# Patient Record
Sex: FEMALE | Race: WHITE | Employment: OTHER | URBAN - METROPOLITAN AREA
[De-identification: names, ages, dates, MRNs, and addresses within clinical notes are randomized per-mention and may not be internally consistent; named-entity substitution may affect disease eponyms.]

---

## 2017-01-16 ENCOUNTER — OFFICE VISIT (OUTPATIENT)
Dept: ORTHOPEDIC SURGERY | Age: 62
End: 2017-01-16

## 2017-01-16 VITALS
RESPIRATION RATE: 16 BRPM | HEART RATE: 79 BPM | HEIGHT: 62 IN | WEIGHT: 136.2 LBS | SYSTOLIC BLOOD PRESSURE: 125 MMHG | DIASTOLIC BLOOD PRESSURE: 80 MMHG | BODY MASS INDEX: 25.06 KG/M2

## 2017-01-16 DIAGNOSIS — M54.14 THORACIC AND LUMBOSACRAL NEURITIS: ICD-10-CM

## 2017-01-16 DIAGNOSIS — M51.37 DEGENERATION OF LUMBAR OR LUMBOSACRAL INTERVERTEBRAL DISC: ICD-10-CM

## 2017-01-16 DIAGNOSIS — M54.17 THORACIC AND LUMBOSACRAL NEURITIS: ICD-10-CM

## 2017-01-16 DIAGNOSIS — M54.50 LOW BACK PAIN WITHOUT SCIATICA, UNSPECIFIED BACK PAIN LATERALITY, UNSPECIFIED CHRONICITY: Primary | ICD-10-CM

## 2017-01-16 DIAGNOSIS — M81.0 OSTEOPOROSIS: ICD-10-CM

## 2017-01-16 DIAGNOSIS — M53.3 SACRAL PAIN: ICD-10-CM

## 2017-01-16 RX ORDER — ALENDRONATE SODIUM 70 MG/1
70 TABLET ORAL
COMMUNITY
Start: 2016-10-25

## 2017-01-16 RX ORDER — OXYCODONE AND ACETAMINOPHEN 5; 325 MG/1; MG/1
TABLET ORAL
Qty: 40 TAB | Refills: 0 | Status: CANCELLED | OUTPATIENT
Start: 2017-01-16

## 2017-01-16 RX ORDER — OXYCODONE AND ACETAMINOPHEN 5; 325 MG/1; MG/1
TABLET ORAL
Qty: 40 TAB | Refills: 0 | Status: SHIPPED | OUTPATIENT
Start: 2017-01-16 | End: 2017-08-18

## 2017-01-16 NOTE — MR AVS SNAPSHOT
Visit Information Date & Time Provider Department Dept. Phone Encounter #  
 1/16/2017  8:20 AM Everton Hernández  [facility] [address] [facility] - SPECIALTY [place] [phone] Follow-up Instructions Return for following MRI. Upcoming Health Maintenance Date Due Hepatitis C Screening 1955 ZOSTER VACCINE AGE 60> 6/23/2016 INFLUENZA AGE 9 TO ADULT 8/1/2016 PAP AKA CERVICAL CYTOLOGY 5/1/2017 BREAST CANCER SCRN MAMMOGRAM 10/11/2018 COLONOSCOPY 12/23/2021 DTaP/Tdap/Td series (2 - Td) 1/1/2022 Allergies as of 1/16/2017  Review Complete On: 1/16/2017 By: Everton Hernández MD  
  
 Severity Noted Reaction Type Reactions Sulfa (Sulfonamide Antibiotics)  05/06/2015    Hives Current Immunizations  Never Reviewed Name Date Pneumococcal Polysaccharide (PPSV-23) 7/1/2012 Tdap 1/1/2012 Not reviewed this visit You Were Diagnosed With   
  
 Codes Comments Low back pain without sciatica, unspecified back pain laterality, unspecified chronicity    -  Primary ICD-10-CM: M54.5 ICD-9-CM: 724.2 Thoracic and lumbosacral neuritis     ICD-10-CM: M54.14, M54.17 ICD-9-CM: 724.4 Degeneration of lumbar or lumbosacral intervertebral disc     ICD-10-CM: M51.37 
ICD-9-CM: 722.52 Sacral pain     ICD-10-CM: M53.3 ICD-9-CM: 724.6 Osteoporosis     ICD-10-CM: M81.0 ICD-9-CM: 733.00 Vitals BP Pulse Resp Height(growth percentile) Weight(growth percentile) BMI  
 125/80 (BP 1 Location: Left arm, BP Patient Position: Sitting) 79 16 5' 2\" (1.575 m) 136 lb 3.2 oz (61.8 kg) 24.91 kg/m2 OB Status Smoking Status Postmenopausal Never Smoker BMI and BSA Data Body Mass Index Body Surface Area 24.91 kg/m 2 1.64 m 2 Preferred Pharmacy Pharmacy Name Phone [facility], [address] Susanna Ulises [phone] Your Updated Medication List  
 This list is accurate as of: 1/16/17  9:44 AM.  Always use your most recent med list.  
  
  
  
  
 alendronate 70 mg tablet Commonly known as:  FOSAMAX AMBIEN CR 12.5 mg tablet Generic drug:  zolpidem CR Take 12.5 mg by mouth nightly as needed for Sleep. amLODIPine 10 mg tablet Commonly known as:  Diane Darting Take 1 Tab by mouth daily. CELLCEPT PO Take  by mouth.  
  
 levothyroxine 150 mcg tablet Commonly known as:  SYNTHROID  
1 qd MAXALT PO Take  by mouth. ORACEA 40 mg capsule Generic drug:  doxycycline monohydrate Take 40 mg by mouth every morning. oxyCODONE-acetaminophen 5-325 mg per tablet Commonly known as:  PERCOCET Take 1 po q daily - bid prn pain  
  
 predniSONE 5 mg tablet Commonly known as:  Hollace John Take 5 mg by mouth daily. RESTASIS 0.05 % ophthalmic emulsion Generic drug:  cycloSPORINE Administer 1 Drop to both eyes two (2) times a day. TOPAMAX 100 mg tablet Generic drug:  topiramate Take  by mouth two (2) times a day. valACYclovir 1 gram tablet Commonly known as:  VALTREX Take 1 Tab by mouth daily. VALIUM PO Take 5 mg by mouth three (3) times daily as needed. WELLBUTRIN  mg XL tablet Generic drug:  buPROPion XL Take 300 mg by mouth every morning. Prescriptions Printed Refills  
 oxyCODONE-acetaminophen (PERCOCET) 5-325 mg per tablet 0 Sig: Take 1 po q daily - bid prn pain  
 Class: Print We Performed the Following AMB POC XRAY, SPINE, LUMBOSACRAL; 2 O [32464 CPT(R)] POC XRAY, PELVIS; 1 OR 2 VIEWS [73681 CPT(R)] Follow-up Instructions Return for following MRI. To-Do List   
 01/23/2017 Imaging:  MRI LUMB SPINE WO CONT   
  
 01/23/2017 Imaging:  MRI PELV WO CONT Referral Information Referral ID Referred By Referred To  
  
 [id number] AMPARO COLBY III Not Available Visits Status Start Date End Date 1 New Request 1/16/17 1/16/18 If your referral has a status of pending review or denied, additional information will be sent to support the outcome of this decision. Referral ID Referred By Referred To  
 [id number] AMPARO COLBY III Not Available Visits Status Start Date End Date 1 New Request 1/16/17 1/16/18 If your referral has a status of pending review or denied, additional information will be sent to support the outcome of this decision. Introducing [facility]! Renae Huerta introduces ePAR patient portal. Now you can access parts of your medical record, email your doctor's office, and request medication refills online. 1. In your internet browser, go to [email or web address] 2. Click on the First Time User? Click Here link in the Sign In box. You will see the New Member Sign Up page. 3. Enter your ePAR Access Code exactly as it appears below. You will not need to use this code after youve completed the sign-up process. If you do not sign up before the expiration date, you must request a new code. · ePAR Access Code: [email or web address] Expires: 4/16/2017  8:02 AM 
 
4. Enter the last four digits of your Social Security Number (xxxx) and Date of Birth (mm/dd/yyyy) as indicated and click Submit. You will be taken to the next sign-up page. 5. Create a ePAR ID. This will be your ePAR login ID and cannot be changed, so think of one that is secure and easy to remember. 6. Create a ePAR password. You can change your password at any time. 7. Enter your Password Reset Question and Answer. This can be used at a later time if you forget your password. 8. Enter your e-mail address. You will receive e-mail notification when new information is available in [address] 9. Click Sign Up. You can now view and download portions of your medical record. 10. Click the Download Summary menu link to download a portable copy of your medical information. If you have questions, please visit the Frequently Asked Questions section of the [employer] website. Remember, [employer] is NOT to be used for urgent needs. For medical emergencies, dial 911. Now available from your iPhone and Android! Please provide this summary of care documentation to your next provider. Your primary care clinician is listed as Leni Hale. If you have any questions after today's visit, please call [phone].

## 2017-01-16 NOTE — PROGRESS NOTES
[facility]  [address], [address]  Phone: [phone]  Fax: [phone]        PROGRESS NOTE      HISTORY OF PRESENT ILLNESS:  The patient is a 62 y.o. female and was seen today for follow up of chronic low back pain extending into the BLE in an S1 distribution with RLE > LLE. She described symptoms consistent for stenosis. She reports near resolution of her back nad sacral pain. Lumbar spine MRI dated 6/4/15 per report revealed partial visualization of a suspected stress versus insufficiency fracture at the right upper sacral wing. Consider dedicated MRI, or nuclear medicine imaging. Relatively mild multielvel degenerative disc disease of the lumbar spine. Most notable at L4/5 and L1/2 along L5/S1. No high-grade spinal stenosis. The patient returns today with complaints of low back pain into the RLE posteriorly to the knee. Patient reports recurrence of pain six months ago. She rates pain 3-6/10. patient states she has been working again. Her pain is not positional. I last saw patient on 8/6/15 with c/o chronic low back pain into the BLE. At that time, she reported near resolution of her pain and was scheduled to come back on an as-needed basis. She denies hx of lumbar spinal surgery. Pt received lumbar blocks back in 2012 with good relief. She has attended physical therapy and a chiropractor in the past with benefit. Pt is inconsistent with her HEP. She is taking Percocet prn for pain relief. Pt states she d/c'd Fosamax due to GI upset roughly three months ago. She is currently taking Topamax 100 mg BID. Pt is prescribed chronic Prednisone for pancreas vulgaris. Noted, patient is followed by an endocrinologist in [place].  reviewed.        Past Medical History   Diagnosis Date    Arthritis     Back pain     Bursitis of right shoulder      subacromial    Degenerative arthritis of right knee     Depression     DVT of leg (deep venous thrombosis) ([facility] [place] [facility] 7/2015     (L) ankle fx    GERD (gastroesophageal reflux disease)     Herniated disc     Herpes simplex      genital    Hypertension     IBS (irritable bowel syndrome)     Lower extremity pain, bilateral     Migraine headache     Pemphigus vulgaris     Right knee pain     Right shoulder pain     Rosacea     Thyroid disease     Venous (peripheral) insufficiency     Wears glasses         Social History     Social History    Marital status:      Spouse name: N/A    Number of children: N/A    Years of education: N/A     Occupational History    Not on file. Social History Main Topics    Smoking status: Never Smoker    Smokeless tobacco: Never Used    Alcohol use 0.0 oz/week     0 Standard drinks or equivalent per week      Comment: One glass of wine per month    Drug use: No    Sexual activity: Not on file     Other Topics Concern    Not on file     Social History Narrative       Current Outpatient Prescriptions   Medication Sig Dispense Refill    oxyCODONE-acetaminophen (PERCOCET) 5-325 mg per tablet Take 1 po q daily - bid prn pain 40 Tab 0    amLODIPine (NORVASC) 10 mg tablet Take 1 Tab by mouth daily. 90 Tab 3    valACYclovir (VALTREX) 1 gram tablet Take 1 Tab by mouth daily. 90 Tab 3    levothyroxine (SYNTHROID) 150 mcg tablet 1 qd 90 Tab 3    predniSONE (DELTASONE) 5 mg tablet Take 5 mg by mouth daily.  MYCOPHENOLATE MOFETIL (CELLCEPT PO) Take  by mouth.  buPROPion XL (WELLBUTRIN XL) 300 mg XL tablet Take 300 mg by mouth every morning.  topiramate (TOPAMAX) 100 mg tablet Take  by mouth two (2) times a day.  RIZATRIPTAN BENZOATE (MAXALT PO) Take  by mouth.  doxycycline monohydrate (ORACEA) 40 mg capsule Take 40 mg by mouth every morning.  zolpidem CR (AMBIEN CR) 12.5 mg tablet Take 12.5 mg by mouth nightly as needed for Sleep.  DIAZEPAM (VALIUM PO) Take 5 mg by mouth three (3) times daily as needed.       cycloSPORINE (RESTASIS) 0.05 % ophthalmic emulsion Administer 1 Drop to both eyes two (2) times a day.  alendronate (FOSAMAX) 70 mg tablet          Allergies   Allergen Reactions    Sulfa (Sulfonamide Antibiotics) Hives          PHYSICAL EXAMINATION    Visit Vitals    /80 (BP 1 Location: Left arm, BP Patient Position: Sitting)    Pulse 79    Resp 16    Ht 5' 2\" (1.575 m)    Wt 136 lb 3.2 oz (61.8 kg)    BMI 24.91 kg/m2       CONSTITUTIONAL: NAD, A&O x 3  SENSATION: Increased tenderness to palpation of bilateral SI joint. Intact to light touch throughout  RANGE OF MOTION: The patient has full passive range of motion in all four extremities. MOTOR:  Straight Leg Raise: Negative, bilateral   MARTINA SIGN: Positive and equivocal, bilaterally. Hip Flex Knee Ext Knee Flex Ankle DF GTE Ankle PF Tone   Right +4/5 +4/5 +4/5 +4/5 +4/5 +4/5 +4/5   Left +4/5 +4/5 +4/5 +4/5 +4/5 +4/5 +4/5     RADIOGRAPHS  Preliminary reading of lumbosacral plain films:  Mild disc space narrowing L5-S1. Anterior osteophytes L5. No acute pathology identified. ASSESSMENT   Anthony Coates was seen today for back pain and leg pain. Diagnoses and all orders for this visit:    Low back pain without sciatica, unspecified back pain laterality, unspecified chronicity  -     [25402] L/S 2-3 views  -     MRI PELV WO CONT; Future  -     MRI LUMB SPINE WO CONT; Future  -     oxyCODONE-acetaminophen (PERCOCET) 5-325 mg per tablet; Take 1 po q daily - bid prn pain    Thoracic and lumbosacral neuritis  -     MRI PELV WO CONT; Future  -     MRI LUMB SPINE WO CONT; Future  -     oxyCODONE-acetaminophen (PERCOCET) 5-325 mg per tablet; Take 1 po q daily - bid prn pain    Degeneration of lumbar or lumbosacral intervertebral disc  -     MRI PELV WO CONT; Future  -     MRI LUMB SPINE WO CONT; Future  -     oxyCODONE-acetaminophen (PERCOCET) 5-325 mg per tablet;  Take 1 po q daily - bid prn pain    Sacral pain  -     MRI PELV WO CONT; Future  - MRI LUMB SPINE WO CONT; Future  -     [01987] Pelvis 1-2 views  -     oxyCODONE-acetaminophen (PERCOCET) 5-325 mg per tablet; Take 1 po q daily - bid prn pain    Osteoporosis  -     MRI PELV WO CONT; Future  -     MRI LUMB SPINE WO CONT; Future  -     oxyCODONE-acetaminophen (PERCOCET) 5-325 mg per tablet; Take 1 po q daily - bid prn pain    Other orders  -     Cancel: oxyCODONE-acetaminophen (PERCOCET) 5-325 mg per tablet; 1 tab PO Qday- BID prn pain          IMPRESSION AND PLAN:  The patient presents with similar pain complaints as from when I saw her in August 2015. Clinically, she appears to have bilateral SI joint dysfunction, worse on the right. I will set her up for a lumbar spine MRI. I advised patient to bring copies of films to next visit. We discussed a referral to an endocrinologist in the near future to further assess her osteoporosis. I will refill her Percocet. I will see the patient back following MRI. Written by Roma Vick, as dictated by Chanelle Gonzalez MD  I examined the patient, reviewed and agree with the note.

## 2017-01-19 ENCOUNTER — DOCUMENTATION ONLY (OUTPATIENT)
Dept: ORTHOPEDIC SURGERY | Age: 62
End: 2017-01-19

## 2017-01-19 NOTE — PROGRESS NOTES
MRI Lumbar w/o is scheduled at [facility], [phone], on 01/28/17, arrive 7:30AM, test 8:00AM. NPO 4 hours prior to testing. No [employer] pre-authorization is required, reference # [id number] per Ms. Bud Roman on 01/19/17. MRI Pelvis w/o is scheduled to follow the MRI Lumbar listed above. [facility], reference# [id number] per Ms. Bud Roman on 01/19/17.

## 2017-02-15 ENCOUNTER — OFFICE VISIT (OUTPATIENT)
Dept: ORTHOPEDIC SURGERY | Age: 62
End: 2017-02-15

## 2017-02-15 VITALS
RESPIRATION RATE: 16 BRPM | DIASTOLIC BLOOD PRESSURE: 85 MMHG | SYSTOLIC BLOOD PRESSURE: 124 MMHG | HEIGHT: 62 IN | WEIGHT: 127 LBS | BODY MASS INDEX: 23.37 KG/M2 | HEART RATE: 76 BPM

## 2017-02-15 DIAGNOSIS — M54.14 RADICULOPATHY, THORACIC REGION: Primary | ICD-10-CM

## 2017-02-15 DIAGNOSIS — M51.37 OTHER INTERVERTEBRAL DISC DEGENERATION, LUMBOSACRAL REGION: ICD-10-CM

## 2017-02-15 DIAGNOSIS — M53.3 SACROCOCCYGEAL DISORDERS, NOT ELSEWHERE CLASSIFIED: ICD-10-CM

## 2017-02-15 NOTE — MR AVS SNAPSHOT
Visit Information Date & Time Provider Department Dept. Phone Encounter #  
 2/15/2017  2:55 PM Elder Pitts MD [facility] [place] [phone] [id number] Follow-up Instructions Return if symptoms worsen or fail to improve. Upcoming Health Maintenance Date Due Hepatitis C Screening 1955 ZOSTER VACCINE AGE 60> 6/23/2016 INFLUENZA AGE 9 TO ADULT 8/1/2016 PAP AKA CERVICAL CYTOLOGY 5/1/2017 BREAST CANCER SCRN MAMMOGRAM 10/11/2018 COLONOSCOPY 12/23/2021 DTaP/Tdap/Td series (2 - Td) 1/1/2022 Allergies as of 2/15/2017  Review Complete On: 2/15/2017 By: Elder Pitts MD  
  
 Severity Noted Reaction Type Reactions Sulfa (Sulfonamide Antibiotics)  05/06/2015    Hives Current Immunizations  Never Reviewed Name Date Pneumococcal Polysaccharide (PPSV-23) 7/1/2012 Tdap 1/1/2012 Not reviewed this visit You Were Diagnosed With   
  
 Codes Comments Radiculopathy, thoracic region    -  Primary ICD-10-CM: M54.14 
ICD-9-CM: 724.4 Vitals BP Pulse Resp Height(growth percentile) Weight(growth percentile) BMI  
 124/85 (BP 1 Location: Left arm, BP Patient Position: Sitting) 76 16 5' 2\" (1.575 m) 127 lb (57.6 kg) 23.23 kg/m2 OB Status Smoking Status Postmenopausal Never Smoker Vitals History BMI and BSA Data Body Mass Index Body Surface Area  
 23.23 kg/m 2 1.59 m 2 Preferred Pharmacy Pharmacy Name Phone [facility] [address], [address] [facility] [phone] Your Updated Medication List  
  
   
This list is accurate as of: 2/15/17  3:42 PM.  Always use your most recent med list.  
  
  
  
  
 alendronate 70 mg tablet Commonly known as:  FOSAMAX AMBIEN CR 12.5 mg tablet Generic drug:  zolpidem CR Take 12.5 mg by mouth nightly as needed for Sleep. amLODIPine 10 mg tablet Commonly known as:  Abel Alstrom Take 1 Tab by mouth daily. CELLCEPT PO Take  by mouth.  
  
 levothyroxine 150 mcg tablet Commonly known as:  SYNTHROID  
1 qd MAXALT PO Take  by mouth. ORACEA 40 mg capsule Generic drug:  doxycycline monohydrate Take 40 mg by mouth every morning. oxyCODONE-acetaminophen 5-325 mg per tablet Commonly known as:  PERCOCET Take 1 po q daily - bid prn pain  
  
 predniSONE 5 mg tablet Commonly known as:  Berle Pouch Take 5 mg by mouth daily. RESTASIS 0.05 % ophthalmic emulsion Generic drug:  cycloSPORINE Administer 1 Drop to both eyes two (2) times a day. TOPAMAX 100 mg tablet Generic drug:  topiramate Take  by mouth two (2) times a day. valACYclovir 1 gram tablet Commonly known as:  VALTREX Take 1 Tab by mouth daily. VALIUM PO Take 5 mg by mouth three (3) times daily as needed. WELLBUTRIN  mg XL tablet Generic drug:  buPROPion XL Take 300 mg by mouth every morning. Follow-up Instructions Return if symptoms worsen or fail to improve. Introducing \[facility]\"" & HEALTH SERVICES! Beny Ontiveros introduces [employer] patient portal. Now you can access parts of your medical record, email your doctor's office, and request medication refills online. 1. In your internet browser, go to [email or web address] 2. Click on the First Time User? Click Here link in the Sign In box. You will see the New Member Sign Up page. 3. Enter your [employer] Access Code exactly as it appears below. You will not need to use this code after youve completed the sign-up process. If you do not sign up before the expiration date, you must request a new code. · [employer] Access Code: [email or web address] Expires: 4/16/2017  8:02 AM 
 
4. Enter the last four digits of your Social Security Number (xxxx) and Date of Birth (mm/dd/yyyy) as indicated and click Submit. You will be taken to the next sign-up page. 5. Create a Lenda ID. This will be your [employer] login ID and cannot be changed, so think of one that is secure and easy to remember. 6. Create a Lenda password. You can change your password at any time. 7. Enter your Password Reset Question and Answer. This can be used at a later time if you forget your password. 8. Enter your e-mail address. You will receive e-mail notification when new information is available in [address] 9. Click Sign Up. You can now view and download portions of your medical record. 10. Click the Download Summary menu link to download a portable copy of your medical information. If you have questions, please visit the Frequently Asked Questions section of the [employer] website. Remember, [employer] is NOT to be used for urgent needs. For medical emergencies, dial 911. Now available from your iPhone and Android! Please provide this summary of care documentation to your next provider. Your primary care clinician is listed as Veronica Dover. If you have any questions after today's visit, please call [phone].

## 2017-02-15 NOTE — PROGRESS NOTES
[facility]  [address], [address]  Phone: [phone]  Fax: [phone]        PROGRESS NOTE      HISTORY OF PRESENT ILLNESS:  The patient is a 62 y.o. female and was seen today for follow up of chronic low back pain into the RLE extending in an S1 distribution to the knee. She denies symptoms in the LLE at this time. She described symptoms consistent for stenosis. Patient states she has been working again. Her pain is not positional. She denies hx of lumbar spinal surgery. Pt received lumbar blocks back in 2012 with good relief. She has attended physical therapy and a chiropractor in the past with benefit. Pt is inconsistent with her HEP. She is taking Percocet prn for pain relief. Pt states she d/c'd Fosamax due to GI upset which she has taken x 6 months. She is currently taking Topamax 100 mg BID. Pt is prescribed chronic Prednisone for pancreas vulgaris. Noted, patient is followed by an endocrinologist in [place]. Lumbar spine MRI dated 6/4/15 per report revealed partial visualization of a suspected stress versus insufficiency fracture at the right upper sacral wing. Consider dedicated MRI, or nuclear medicine imaging. Relatively mild multielvel degenerative disc disease of the lumbar spine. Most notable at L4/5 and L1/2 along L5/S1. No high-grade spinal stenosis. Preliminary reading of lumbosacral plain films revealed mild disc space narrowing L5-S1. Anterior osteophytes L5. No acute pathology identified. At her last clinical appointment, patient presented with similar pain complaints as from when I saw her in August 2015. Clinically, she appeared to have bilateral SI joint dysfunction, worse on the right. I set her up for a lumbar spine MRI. We discussed a referral to an endocrinologist in the near future to further assess her osteoporosis. I refilled her Percocet.       The patient returns today with pain location and distribution remain unchanged. She rates pain 0-1/10, an improvement since her last visit (3-6/10). Pt reports she has been completing her HEP with benefit. She states she was sick and resting which may have been caused an improvement in her pain. Examination continues to be consistent with sacroiliitis on the right. Pt reports she will have a bone density test in the summer. Lumbar spine MRI dated 1/30/17 reviewed. Per report, there was multilevel degenerative changes with mild areas of canal and foraminal encroachment. Left lateral disc osteophyte complexes noted mildly contacting and displacing the exiting left L5 nerve root. Small right paracentral disc protrusion at L1-2. Pelvic MRI dated 1/30/17 was reviewed. Per report, no acute findings. Mild bilateral SI joint osteoarthritic changes with evidence of prior healed insufficiency fracture along the upper anterior aspect of the right sacral ala. Lower lumbar spondylosis at L4-L5 and L5-S1.  reviewed. Past Medical History   Diagnosis Date    Arthritis     Back pain     Bursitis of right shoulder      subacromial    Degenerative arthritis of right knee     Depression     [facility] 7/2015     (L) ankle fx    GERD (gastroesophageal reflux disease)     Herniated disc     Herpes simplex      genital    Hypertension     IBS (irritable bowel syndrome)     Lower extremity pain, bilateral     Migraine headache     Pemphigus vulgaris     Right knee pain     Right shoulder pain     Rosacea     Thyroid disease     Venous (peripheral) insufficiency     Wears glasses         Social History     Social History    Marital status:      Spouse name: N/A    Number of children: N/A    Years of education: N/A     Occupational History    Not on file.      Social History Main Topics    Smoking status: Never Smoker    Smokeless tobacco: Never Used    Alcohol use 0.0 oz/week     0 Standard drinks or equivalent per week      Comment: One glass of wine per month    Drug use: No    Sexual activity: Not on file     Other Topics Concern    Not on file     Social History Narrative       Current Outpatient Prescriptions   Medication Sig Dispense Refill    alendronate (FOSAMAX) 70 mg tablet       oxyCODONE-acetaminophen (PERCOCET) 5-325 mg per tablet Take 1 po q daily - bid prn pain 40 Tab 0    amLODIPine (NORVASC) 10 mg tablet Take 1 Tab by mouth daily. 90 Tab 3    valACYclovir (VALTREX) 1 gram tablet Take 1 Tab by mouth daily. 90 Tab 3    levothyroxine (SYNTHROID) 150 mcg tablet 1 qd 90 Tab 3    predniSONE (DELTASONE) 5 mg tablet Take 5 mg by mouth daily.  MYCOPHENOLATE MOFETIL (CELLCEPT PO) Take  by mouth.  buPROPion XL (WELLBUTRIN XL) 300 mg XL tablet Take 300 mg by mouth every morning.  topiramate (TOPAMAX) 100 mg tablet Take  by mouth two (2) times a day.  doxycycline monohydrate (ORACEA) 40 mg capsule Take 40 mg by mouth every morning.  zolpidem CR (AMBIEN CR) 12.5 mg tablet Take 12.5 mg by mouth nightly as needed for Sleep.  cycloSPORINE (RESTASIS) 0.05 % ophthalmic emulsion Administer 1 Drop to both eyes two (2) times a day.  RIZATRIPTAN BENZOATE (MAXALT PO) Take  by mouth.  DIAZEPAM (VALIUM PO) Take 5 mg by mouth three (3) times daily as needed. Allergies   Allergen Reactions    Sulfa (Sulfonamide Antibiotics) Hives          PHYSICAL EXAMINATION    Visit Vitals    /85 (BP 1 Location: Left arm, BP Patient Position: Sitting)    Pulse 76    Resp 16    Ht 5' 2\" (1.575 m)    Wt 127 lb (57.6 kg)    BMI 23.23 kg/m2       CONSTITUTIONAL: NAD, A&O x 3  SENSATION: Increased tenderness to palpation of the right SI joint. Intact to light touch throughout  RANGE OF MOTION: The patient has full passive range of motion in all four extremities.   MOTOR:  Straight Leg Raise: Negative, bilateral   MARTINA SIGN: Positive, right               Hip Flex Knee Ext Knee Flex Ankle DF GTE Ankle PF Tone Right +4/5 +4/5 +4/5 +4/5 +4/5 +4/5 +4/5   Left +4/5 +4/5 +4/5 +4/5 +4/5 +4/5 +4/5       ASSESSMENT   Mildred De La Cruz was seen today for back pain and leg pain. Diagnoses and all orders for this visit:    Radiculopathy, thoracic region    Other intervertebral disc degeneration, lumbosacral region    Sacrococcygeal disorders, not elsewhere classified          IMPRESSION AND PLAN:  Her examination continues to be consistent with right SI joint dysfunction. patient declines SI joint injection. She reports rest improved her right SI joint dysfunction and I suspect the Prednisone she is taking chronically also contributed to her decreased pain. The patient does not think her pain complaints are severe enough to warrant additional workup/treatment at this time. I encourage patient to perform her HEP daily. I will see the patient back on an as-needed basis. Written by Xenia Wise, as dictated by Lou Siddiqui MD  I examined the patient, reviewed and agree with the note.

## 2017-02-24 DIAGNOSIS — M54.50 LOW BACK PAIN WITHOUT SCIATICA, UNSPECIFIED BACK PAIN LATERALITY, UNSPECIFIED CHRONICITY: ICD-10-CM

## 2017-02-24 DIAGNOSIS — M54.14 THORACIC AND LUMBOSACRAL NEURITIS: ICD-10-CM

## 2017-02-24 DIAGNOSIS — M53.3 SACRAL PAIN: ICD-10-CM

## 2017-02-24 DIAGNOSIS — M51.37 DEGENERATION OF LUMBAR OR LUMBOSACRAL INTERVERTEBRAL DISC: ICD-10-CM

## 2017-02-24 DIAGNOSIS — M81.0 OSTEOPOROSIS: ICD-10-CM

## 2017-02-24 DIAGNOSIS — M54.17 THORACIC AND LUMBOSACRAL NEURITIS: ICD-10-CM

## 2017-04-14 ENCOUNTER — OFFICE VISIT (OUTPATIENT)
Dept: ORTHOPEDIC SURGERY | Age: 62
End: 2017-04-14

## 2017-04-14 VITALS — DIASTOLIC BLOOD PRESSURE: 73 MMHG | HEART RATE: 74 BPM | SYSTOLIC BLOOD PRESSURE: 104 MMHG | TEMPERATURE: 97.1 F

## 2017-04-14 DIAGNOSIS — M53.3 DISORDER OF SACROCOCCYGEAL SPINE: ICD-10-CM

## 2017-04-14 DIAGNOSIS — M54.14 RADICULOPATHY, THORACIC REGION: Primary | ICD-10-CM

## 2017-04-14 DIAGNOSIS — M51.37 OTHER INTERVERTEBRAL DISC DEGENERATION, LUMBOSACRAL REGION: ICD-10-CM

## 2017-04-14 NOTE — MR AVS SNAPSHOT
Visit Information Date & Time Provider Department Dept. Phone Encounter #  
 4/14/2017 10:50 AM Johanny Pathak MD [facility], [address] and [facility] [place] [phone] [id number] Follow-up Instructions Return for following injection. Upcoming Health Maintenance Date Due Hepatitis C Screening 1955 ZOSTER VACCINE AGE 60> 6/23/2016 INFLUENZA AGE 9 TO ADULT 8/1/2016 PAP AKA CERVICAL CYTOLOGY 5/1/2017 BREAST CANCER SCRN MAMMOGRAM 10/11/2018 DTaP/Tdap/Td series (2 - Td) 1/1/2022 COLONOSCOPY 3/21/2022 Allergies as of 4/14/2017  Review Complete On: 4/14/2017 By: Johanny Pathak MD  
  
 Severity Noted Reaction Type Reactions Sulfa (Sulfonamide Antibiotics)  05/06/2015    Hives Current Immunizations  Never Reviewed Name Date Pneumococcal Polysaccharide (PPSV-23) 7/1/2012 Tdap 1/1/2012 Not reviewed this visit You Were Diagnosed With   
  
 Codes Comments Radiculopathy, thoracic region    -  Primary ICD-10-CM: M54.14 
ICD-9-CM: 724.4 Other intervertebral disc degeneration, lumbosacral region     ICD-10-CM: M51.37 
ICD-9-CM: 722.52 Disorder of sacrococcygeal spine     ICD-10-CM: M53.3 ICD-9-CM: 724.6, 724.70 Vitals BP Pulse Temp OB Status Smoking Status 104/73 74 97.1 °F (36.2 °C) Postmenopausal Never Smoker Preferred Pharmacy Pharmacy Name Phone [facility] [facility], [address] [facility] [phone] Your Updated Medication List  
  
   
This list is accurate as of: 4/14/17 11:34 AM.  Always use your most recent med list.  
  
  
  
  
 alendronate 70 mg tablet Commonly known as:  FOSAMAX AMBIEN CR 12.5 mg tablet Generic drug:  zolpidem CR Take 12.5 mg by mouth nightly as needed for Sleep. amLODIPine 10 mg tablet Commonly known as:  Karyle Kitty Take 1 Tab by mouth daily. CELLCEPT PO Take  by mouth. levothyroxine 150 mcg tablet Commonly known as:  SYNTHROID  
1 qd MAXALT PO Take  by mouth. ORACEA 40 mg capsule Generic drug:  doxycycline monohydrate Take 40 mg by mouth every morning. oxyCODONE-acetaminophen 5-325 mg per tablet Commonly known as:  PERCOCET Take 1 po q daily - bid prn pain  
  
 predniSONE 5 mg tablet Commonly known as:  Job Jc Take 5 mg by mouth daily. RESTASIS 0.05 % ophthalmic emulsion Generic drug:  cycloSPORINE Administer 1 Drop to both eyes two (2) times a day. TOPAMAX 100 mg tablet Generic drug:  topiramate Take  by mouth two (2) times a day. valACYclovir 1 gram tablet Commonly known as:  VALTREX Take 1 Tab by mouth daily. VALIUM PO Take 5 mg by mouth three (3) times daily as needed. WELLBUTRIN  mg XL tablet Generic drug:  buPROPion XL Take 300 mg by mouth every morning. Follow-up Instructions Return for following injection. Introducing hospitals & [facility] SERVICES! Jillian Sales introduces [employer] patient portal. Now you can access parts of your medical record, email your doctor's office, and request medication refills online. 1. In your internet browser, go to [email or web address] 2. Click on the First Time User? Click Here link in the Sign In box. You will see the New Member Sign Up page. 3. Enter your Social[employer]ides Access Code exactly as it appears below. You will not need to use this code after youve completed the sign-up process. If you do not sign up before the expiration date, you must request a new code. · Social[employer] Access Code: [email or web address] Expires: 4/16/2017  9:02 AM 
 
4. Enter the last four digits of your Social Security Number (xxxx) and Date of Birth (mm/dd/yyyy) as indicated and click Submit. You will be taken to the next sign-up page. 5. Create a SocialGuides ID.  This will be your SocialGuides login ID and cannot be changed, so think of one that is secure and easy to remember. 6. Create a [employer] password. You can change your password at any time. 7. Enter your Password Reset Question and Answer. This can be used at a later time if you forget your password. 8. Enter your e-mail address. You will receive e-mail notification when new information is available in [address] 9. Click Sign Up. You can now view and download portions of your medical record. 10. Click the Download Summary menu link to download a portable copy of your medical information. If you have questions, please visit the Frequently Asked Questions section of the [employer] website. Remember, [employer] is NOT to be used for urgent needs. For medical emergencies, dial 911. Now available from your iPhone and Android! Please provide this summary of care documentation to your next provider. Your primary care clinician is listed as Medardo Alatorre. If you have any questions after today's visit, please call [phone].

## 2017-04-14 NOTE — PROGRESS NOTES
[facility]  [address], [address]  Phone: [phone]  Fax: [phone]        PROGRESS NOTE      HISTORY OF PRESENT ILLNESS:  The patient is a 62 y.o. female and was seen today for follow up of chronic low back pain into the RLE extending in an S1 distribution to the knee. She denies symptoms in the LLE at this time. She described symptoms consistent for stenosis. Patient states she has been working again. Her pain is not positional. Examination continues to be consistent with sacroiliitis on the right. She denies hx of lumbar spinal surgery. Pt received lumbar blocks back in 2012 with good relief. She has attended physical therapy and a chiropractor in the past with benefit. Pt reports she has been completing her HEP with benefit. She is taking Percocet prn for pain relief. Pt states she d/c'd Fosamax due to GI upset which she has taken x 6 months. She is currently taking Topamax 100 mg BID. Pt is prescribed chronic Prednisone for pancreas vulgaris. Noted, patient is followed by an endocrinologist in [place]. Preliminary reading of lumbosacral plain films revealed mild disc space narrowing L5-S1. Anterior osteophytes L5. No acute pathology identified. Lumbar spine MRI dated 1/30/17 reviewed. Per report, there was multilevel degenerative changes with mild areas of canal and foraminal encroachment. Left lateral disc osteophyte complexes noted mildly contacting and displacing the exiting left L5 nerve root. Small right paracentral disc protrusion at L1-2. Pelvic MRI dated 1/30/17 was reviewed. Per report, no acute findings. Mild bilateral SI joint osteoarthritic changes with evidence of prior healed insufficiency fracture along the upper anterior aspect of the right sacral ala. Lower lumbar spondylosis at L4-L5 and L5-S1. At her last clinical appointment, her examination continued to be consistent with right SI joint dysfunction.  Patient declined SI joint injection. She reported rest improved her right SI joint dysfunction and I suspected the Prednisone she was taking chronically also contributed to her decreased pain. The patient did not think her pain complaints were severe enough to warrant additional workup/treatment at that time. I encouraged patient to perform her HEP daily. I last saw patient on 2/15/17 and was scheduled to follow up on an as-needed basis. The patient returns today reporting recurrence of right buttock pain extending in an S1 distribution into the knee x 3 weeks. She rates pain 7/10, significantly elevated since her last visit (0-1/10). Pt reports she continues on chronic Prednisone for pancreas vulgaris. Pt denies h/o DM.  reviewed. Past Medical History:   Diagnosis Date    Arthritis     Back pain     Bursitis of right shoulder     subacromial    Degenerative arthritis of right knee     Depression     [facility]) 7/2015    (L) ankle fx    GERD (gastroesophageal reflux disease)     Herniated disc     Herpes simplex     genital    Hypertension     IBS (irritable bowel syndrome)     Lower extremity pain, bilateral     Migraine headache     Pemphigus vulgaris     Right knee pain     Right shoulder pain     Rosacea     Thyroid disease     Venous (peripheral) insufficiency     Wears glasses         Social History     Social History    Marital status:      Spouse name: N/A    Number of children: N/A    Years of education: N/A     Occupational History    Not on file.      Social History Main Topics    Smoking status: Never Smoker    Smokeless tobacco: Never Used    Alcohol use 0.0 oz/week     0 Standard drinks or equivalent per week      Comment: One glass of wine per month    Drug use: No    Sexual activity: Not on file     Other Topics Concern    Not on file     Social History Narrative       Current Outpatient Prescriptions   Medication Sig Dispense Refill    alendronate (FOSAMAX) 70 mg tablet       oxyCODONE-acetaminophen (PERCOCET) 5-325 mg per tablet Take 1 po q daily - bid prn pain 40 Tab 0    amLODIPine (NORVASC) 10 mg tablet Take 1 Tab by mouth daily. 90 Tab 3    valACYclovir (VALTREX) 1 gram tablet Take 1 Tab by mouth daily. 90 Tab 3    levothyroxine (SYNTHROID) 150 mcg tablet 1 qd 90 Tab 3    predniSONE (DELTASONE) 5 mg tablet Take 5 mg by mouth daily.  MYCOPHENOLATE MOFETIL (CELLCEPT PO) Take  by mouth.  buPROPion XL (WELLBUTRIN XL) 300 mg XL tablet Take 300 mg by mouth every morning.  topiramate (TOPAMAX) 100 mg tablet Take  by mouth two (2) times a day.  RIZATRIPTAN BENZOATE (MAXALT PO) Take  by mouth.  doxycycline monohydrate (ORACEA) 40 mg capsule Take 40 mg by mouth every morning.  DIAZEPAM (VALIUM PO) Take 5 mg by mouth three (3) times daily as needed.  cycloSPORINE (RESTASIS) 0.05 % ophthalmic emulsion Administer 1 Drop to both eyes two (2) times a day.  zolpidem CR (AMBIEN CR) 12.5 mg tablet Take 12.5 mg by mouth nightly as needed for Sleep. Allergies   Allergen Reactions    Sulfa (Sulfonamide Antibiotics) Hives          PHYSICAL EXAMINATION    Visit Vitals    /73    Pulse 74    Temp 97.1 °F (36.2 °C)       CONSTITUTIONAL: NAD, A&O x 3  SENSATION: Increased tenderness to palpation of the right SI joint. Intact to light touch throughout  RANGE OF MOTION: The patient has full passive range of motion in all four extremities. MOTOR:  Straight Leg Raise: Negative, bilateral  MARTINA SIGN: Positive, right               Hip Flex Knee Ext Knee Flex Ankle DF GTE Ankle PF Tone   Right +4/5 +4/5 +4/5 +4/5 +4/5 +4/5 +4/5   Left +4/5 +4/5 +4/5 +4/5 +4/5 +4/5 +4/5       ASSESSMENT   Mimi Dill was seen today for back pain.     Diagnoses and all orders for this visit:    Radiculopathy, thoracic region  -     SCHEDULE SURGERY    Other intervertebral disc degeneration, lumbosacral region  -     SCHEDULE SURGERY    Disorder of sacrococcygeal spine  -     SCHEDULE SURGERY          IMPRESSION AND PLAN:  The patient continues to demonstrate right SI joint dysfunction. She elects to proceed with right SI joint injection. Patient was given a note for work which will allow her to wear sneakers to work. I will see the patient back following injection. Written by Juana Bloom, as dictated by Lynnette Bonilla MD  I examined the patient, reviewed and agree with the note.

## 2017-04-14 NOTE — LETTER
NOTIFICATION RETURN TO WORK / SCHOOL 
 
4/14/2017 11:33 AM 
 
Ms. Cat Kilpatrick [facility] 
[address] [id number] To Whom It May Concern: 
 
Cat Kilpatrick is currently under the care of [address]. Please allow patient to wear sneakers to work. If there are questions or concerns please have the patient contact our office. Sincerely, Ivelisse Herman MD

## 2017-04-18 ENCOUNTER — DOCUMENTATION ONLY (OUTPATIENT)
Dept: ORTHOPEDIC SURGERY | Age: 62
End: 2017-04-18

## 2017-04-20 ENCOUNTER — HOSPITAL ENCOUNTER (OUTPATIENT)
Age: 62
Setting detail: OUTPATIENT SURGERY
Discharge: HOME | End: 2017-04-20
Attending: PHYSICAL MEDICINE & REHABILITATION | Admitting: PHYSICAL MEDICINE & REHABILITATION
Payer: COMMERCIAL

## 2017-04-20 ENCOUNTER — APPOINTMENT (OUTPATIENT)
Dept: GENERAL RADIOLOGY | Age: 62
End: 2017-04-20
Attending: PHYSICAL MEDICINE & REHABILITATION
Payer: COMMERCIAL

## 2017-04-20 VITALS
DIASTOLIC BLOOD PRESSURE: 74 MMHG | HEART RATE: 77 BPM | TEMPERATURE: 98.3 F | BODY MASS INDEX: 23.37 KG/M2 | SYSTOLIC BLOOD PRESSURE: 112 MMHG | OXYGEN SATURATION: 98 % | HEIGHT: 62 IN | WEIGHT: 127 LBS | RESPIRATION RATE: 18 BRPM

## 2017-04-20 PROCEDURE — 74011000250 HC RX REV CODE- 250

## 2017-04-20 PROCEDURE — 76010000009 HC PAIN MGT 0 TO 30 MIN PROC: Performed by: PHYSICAL MEDICINE & REHABILITATION

## 2017-04-20 PROCEDURE — 77030003676 HC NDL SPN MPRI -A: Performed by: PHYSICAL MEDICINE & REHABILITATION

## 2017-04-20 PROCEDURE — 74011250636 HC RX REV CODE- 250/636

## 2017-04-20 PROCEDURE — 74011250637 HC RX REV CODE- 250/637: Performed by: PHYSICAL MEDICINE & REHABILITATION

## 2017-04-20 PROCEDURE — 74011636320 HC RX REV CODE- 636/320

## 2017-04-20 RX ORDER — DIAZEPAM 5 MG/1
5-20 TABLET ORAL ONCE
Status: COMPLETED | OUTPATIENT
Start: 2017-04-20 | End: 2017-04-20

## 2017-04-20 RX ORDER — DEXAMETHASONE SODIUM PHOSPHATE 100 MG/10ML
INJECTION INTRAMUSCULAR; INTRAVENOUS AS NEEDED
Status: DISCONTINUED | OUTPATIENT
Start: 2017-04-20 | End: 2017-04-20 | Stop reason: HOSPADM

## 2017-04-20 RX ORDER — LIDOCAINE HYDROCHLORIDE 10 MG/ML
INJECTION, SOLUTION EPIDURAL; INFILTRATION; INTRACAUDAL; PERINEURAL AS NEEDED
Status: DISCONTINUED | OUTPATIENT
Start: 2017-04-20 | End: 2017-04-20 | Stop reason: HOSPADM

## 2017-04-20 RX ADMIN — DIAZEPAM 10 MG: 5 TABLET ORAL at 13:32

## 2017-04-20 NOTE — PROCEDURES
Procedure Note    Patient Name: Daniel Ochoa    Date of Procedure: April 20, 2017    Preoperative Diagnosis: Sacrilitis    Post Operative Diagnosis: same    Procedure: SI Joint Injection right     Consent: Informed consent was obtained prior to the procedure. The patient was given the opportunity to ask questions regarding the procedure and its associated risks. In addition to the potential risks associated with the procedure itself, the patient was informed both verbally and in writing of potential side effects of the use of glucocorticoids. The patient appeared to comprehend the informed consent and desired to have the procedure perfored. Procedure: The patient was placed in the prone position on the flouroscopy table and the back was prepped and draped in the usual sterile manner. A #22 gauge spinal needle was then advanced to lie within the SI joint after local Lidocaine 1% injection. A total of 30 mg of preservative free Dexamethasone and 4 cc of Lidocaine was introduced into the SI joint. The injection area was cleaned and bandaids applied. No excessive bleeding was noted. Patient dressed and was discharged to home with instructions. Discussion: The patient tolerated the procedure well.      Shruthi Fregoso MD  April 20, 2017

## 2017-04-20 NOTE — DISCHARGE INSTRUCTIONS
[facility]   ([facility])  Dr. Mabel Ledesma, Dr. Yan Howard, Dr. Candy Francisco not drive a car, operate heavy machinery or dangerous equipment for 24 hours. * Activity as tolerated; rest for the remainder of the day. * Resume pre-block medications including those for your family doctor. * Do not drink alcoholic beverages for 24 hours. Alcohol and the medications you have received may interact and cause an adverse reaction. * You may feel better this evening and worse tomorrow, as the numbing medications wears off and the steroid has yet to begin to work. After 48 hrs the steroid should begin to release bringing you relief. * You may shower this evening and remove any bandages. * Avoid hot tubs and heating pads for 24 hours. You may use cold packs on the procedure site as tolerated for the first 24 hours. * If a headache develops, drink plenty of fluids and rest.  Take over the counter medications for headache if needed. If the headache continues longer than 24 hours, call MD at the [address] [place] [address]. [phone]    * Continue taking pain medications as needed. * You may resume your regular diet if tolerated. Otherwise, start with sips of water and advance slowly. * If Diabetic: check your blood sugar three times a day for the next 3 days. If your sugar is greater than 300 call your family doctor. If your sugar is greater than 400, have someone transport you to the nearest Emergency Room. * If you experience any of the following problems, Please Call the [address] [place] [address] at [phone].         * Shortness of Breath    * Fever of 101 or higher    * Nausea / Vomiting    * Severe Headache    * Weakness or numbness in arms or legs that is not      resolving    * Prolonged increase in pain greater than 4 days      DISCHARGE SUMMARY from Nurse      PATIENT INSTRUCTIONS:    After oral sedation, for 24 hours or while taking prescription Narcotics:  · Limit your activities  · Do not drive and operate hazardous machinery  · Do not make important personal or business decisions  · Do  not drink alcoholic beverages  · If you have not urinated within 8 hours after discharge, please contact your surgeon on call. Report the following to your surgeon:  · Excessive pain, swelling, redness or odor of or around the surgical area  · Temperature over 101  · Nausea and vomiting lasting longer than 4 hours or if unable to take medications  · Any signs of decreased circulation or nerve impairment to extremity: change in color, persistent  numbness, tingling, coldness or increase pain  · Any questions            What to do at Home:  Recommended activity: Activity as tolerated, NO DRIVING FOR 12 Hours post injection          *  Please give a list of your current medications to your Primary Care Provider. *  Please update this list whenever your medications are discontinued, doses are      changed, or new medications (including over-the-counter products) are added. *  Please carry medication information at all times in case of emergency situations. These are general instructions for a healthy lifestyle:    No smoking/ No tobacco products/ Avoid exposure to second hand smoke    Surgeon General's Warning:  Quitting smoking now greatly reduces serious risk to your health. Obesity, smoking, and sedentary lifestyle greatly increases your risk for illness    A healthy diet, regular physical exercise & weight monitoring are important for maintaining a healthy lifestyle    You may be retaining fluid if you have a history of heart failure or if you experience any of the following symptoms:  Weight gain of 3 pounds or more overnight or 5 pounds in a week, increased swelling in our hands or feet or shortness of breath while lying flat in bed.   Please call your doctor as soon as you notice any of these symptoms; do not wait until your next office visit. Recognize signs and symptoms of STROKE:    F-face looks uneven    A-arms unable to move or move unevenly    S-speech slurred or non-existent    T-time-call 911 as soon as signs and symptoms begin-DO NOT go       Back to bed or wait to see if you get better-TIME IS BRAIN.

## 2017-05-12 ENCOUNTER — OFFICE VISIT (OUTPATIENT)
Dept: ORTHOPEDIC SURGERY | Age: 62
End: 2017-05-12

## 2017-05-12 VITALS
HEART RATE: 83 BPM | HEIGHT: 62 IN | DIASTOLIC BLOOD PRESSURE: 74 MMHG | RESPIRATION RATE: 12 BRPM | SYSTOLIC BLOOD PRESSURE: 123 MMHG

## 2017-05-12 DIAGNOSIS — M46.1 SACROILIITIS (HCC): Primary | ICD-10-CM

## 2017-05-12 RX ORDER — OXYCODONE AND ACETAMINOPHEN 5; 325 MG/1; MG/1
1 TABLET ORAL DAILY
Qty: 30 TAB | Refills: 0 | Status: SHIPPED | OUTPATIENT
Start: 2017-05-12 | End: 2018-05-02

## 2017-05-12 NOTE — PROGRESS NOTES
[facility]  [address]  [address], [address]  Phone: [phone]  Fax: [phone]        PROGRESS NOTE      HISTORY OF PRESENT ILLNESS:  The patient is a 62 y.o. female and was seen today for follow up of recurrence of right buttock pain extending in an S1 distribution into the knee. She denies symptoms in the LLE at this time. She described symptoms consistent for stenosis. Pt states she has been working again. Her pain is not positional. Examination continues to be consistent with sacroiliitis on the right. She denies hx of lumbar spinal surgery. Pt received lumbar blocks back in 2012 with good relief. She has attended physical therapy and a chiropractor in the past with benefit. Pt reports she has been completing her HEP with benefit. She is taking Percocet prn for pain relief. Pt states she d/c'd Fosamax due to GI upset. She is currently taking Topamax 100 mg BID. 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 Noted, patient is followed by an endocrinologist in [place]. Pt denies h/o DM. Preliminary reading of lumbosacral plain films revealed mild disc space narrowing L5-S1. Anterior osteophytes L5. No acute pathology identified. Lumbar spine MRI dated 1/30/17 reviewed. Per report, there was multilevel degenerative changes with mild areas of canal and foraminal encroachment. Left lateral disc osteophyte complexes noted mildly contacting and displacing the exiting left L5 nerve root. Small right paracentral disc protrusion at L1-2. Pelvic MRI dated 1/30/17 was reviewed. Per report, no acute findings. Mild bilateral SI joint osteoarthritic changes with evidence of prior healed insufficiency fracture along the upper anterior aspect of the right sacral ala. Lower lumbar spondylosis at L4-L5 and L5-S1. At her last clinical appointment, the patient continued to demonstrate right SI joint dysfunction.  She elected to proceed with right SI joint injection. Patient was given a note for work which would allow her to wear sneakers to work. The patient returns today with pain location and distribution remain unchanged. She continues to rate pain 6-7/10. Pt underwent right SI joint injection on 4/20/17 with temporary relief x 10 days. She restarted her Percocet 5-325 mg prn which I previously prescribed in January 2017. Pt notes she has five tabs remaining.  reviewed. Past Medical History:   Diagnosis Date    Arthritis     Back pain     Bursitis of right shoulder     subacromial    Degenerative arthritis of right knee     Depression     DVT of leg (deep venous thrombosis) ([facility]) 7/2015    (L) ankle fx    GERD (gastroesophageal reflux disease)     Herniated disc     Herpes simplex     genital    Hypertension     IBS (irritable bowel syndrome)     Lower extremity pain, bilateral     Migraine headache     Pemphigus vulgaris     Right knee pain     Right shoulder pain     Rosacea     Thyroid disease     Venous (peripheral) insufficiency     Wears glasses         Social History     Social History    Marital status:      Spouse name: N/A    Number of children: N/A    Years of education: N/A     Occupational History    Not on file. Social History Main Topics    Smoking status: Never Smoker    Smokeless tobacco: Never Used    Alcohol use 0.0 oz/week     0 Standard drinks or equivalent per week      Comment: One glass of wine per month    Drug use: No    Sexual activity: Not on file     Other Topics Concern    Not on file     Social History Narrative       Current Outpatient Prescriptions   Medication Sig Dispense Refill    oxyCODONE-acetaminophen (PERCOCET) 5-325 mg per tablet Take 1 Tab by mouth daily. Max Daily Amount: 1 Tab. 30 Tab 0    alendronate (FOSAMAX) 70 mg tablet Take 70 mg by mouth every seven (7) days.       oxyCODONE-acetaminophen (PERCOCET) 5-325 mg per tablet Take 1 po q daily - bid prn pain 40 Tab 0  amLODIPine (NORVASC) 10 mg tablet Take 1 Tab by mouth daily. 90 Tab 3    valACYclovir (VALTREX) 1 gram tablet Take 1 Tab by mouth daily. 90 Tab 3    levothyroxine (SYNTHROID) 150 mcg tablet 1 qd 90 Tab 3    predniSONE (DELTASONE) 5 mg tablet Take 5 mg by mouth every other day.  MYCOPHENOLATE MOFETIL (CELLCEPT PO) Take 1 g by mouth two (2) times a day.  buPROPion XL (WELLBUTRIN XL) 300 mg XL tablet Take 300 mg by mouth every morning.  topiramate (TOPAMAX) 100 mg tablet Take  by mouth daily.  RIZATRIPTAN BENZOATE (MAXALT PO) Take  by mouth daily as needed.  doxycycline monohydrate (ORACEA) 40 mg capsule Take 40 mg by mouth every morning.  zolpidem CR (AMBIEN CR) 12.5 mg tablet Take 12.5 mg by mouth nightly as needed for Sleep.  DIAZEPAM (VALIUM PO) Take 5 mg by mouth three (3) times daily as needed.  cycloSPORINE (RESTASIS) 0.05 % ophthalmic emulsion Administer 1 Drop to both eyes two (2) times a day. Allergies   Allergen Reactions    Sulfa (Sulfonamide Antibiotics) Hives        PHYSICAL EXAMINATION    Visit Vitals    /74    Pulse 83    Resp 12    Ht 5' 2\" (1.575 m)       CONSTITUTIONAL: NAD, A&O x 3  SENSATION: Increased tenderness to palpation of the right SI joint. Intact to light touch throughout  RANGE OF MOTION: The patient has full passive range of motion in all four extremities. MOTOR:  Straight Leg Raise: Negative, bilateral   MARTINA SIGN: Positive, right               Hip Flex Knee Ext Knee Flex Ankle DF GTE Ankle PF Tone   Right +4/5 +4/5 +4/5 +4/5 +4/5 +4/5 +4/5   Left +4/5 +4/5 +4/5 +4/5 +4/5 +4/5 +4/5       ASSESSMENT   Beverly Gonzalez was seen today for back pain and follow-up. Diagnoses and all orders for this visit:    Sacroiliitis ([facility] [address]  -     REFERRAL TO PHYSICAL THERAPY    Other orders  -     oxyCODONE-acetaminophen (PERCOCET) 5-325 mg per tablet; Take 1 Tab by mouth daily. Max Daily Amount: 1 Tab.           IMPRESSION AND PLAN:  She gained temporary relief with right SI joint injection from 4/20/17. Patient would like to defer additional right SI joint injection at this time. I discuss the option of a sacroiliac joint fusion. I will refill her Percocet 5-325 mg. I will refer her to physical therapy with an emphasis on HEP. I will see the patient back in 1 month's time or earlier if needed. Written by Nadia Valera, as dictated by Kate Zhang MD  I examined the patient, reviewed and agree with the note.

## 2017-05-12 NOTE — MR AVS SNAPSHOT
Visit Information Date & Time Provider Department Dept. Phone Encounter #  
 5/12/2017  2:55 PM Casie Pritchard MD [facility] [place] [phone] [id number] Follow-up Instructions Return in about 4 weeks (around 6/9/2017). Upcoming Health Maintenance Date Due Hepatitis C Screening 1955 ZOSTER VACCINE AGE 60> 6/23/2016 PAP AKA CERVICAL CYTOLOGY 5/1/2017 INFLUENZA AGE 9 TO ADULT 8/1/2017 BREAST CANCER SCRN MAMMOGRAM 10/11/2018 DTaP/Tdap/Td series (2 - Td) 1/1/2022 COLONOSCOPY 3/21/2022 Allergies as of 5/12/2017  Review Complete On: 5/12/2017 By: Casie Pritchard MD  
  
 Severity Noted Reaction Type Reactions Sulfa (Sulfonamide Antibiotics)  05/06/2015    Hives Current Immunizations  Never Reviewed Name Date Pneumococcal Polysaccharide (PPSV-23) 7/1/2012 Tdap 1/1/2012 Not reviewed this visit You Were Diagnosed With   
  
 Codes Comments [facility]    -  Primary ICD-10-CM: M46.1 ICD-9-CM: 720.2 Vitals BP Pulse Resp Height(growth percentile) OB Status Smoking Status 123/74 83 12 5' 2\" (1.575 m) Postmenopausal Never Smoker Preferred Pharmacy Pharmacy Name Phone [facility] [address], [address] [facility] [phone] Your Updated Medication List  
  
   
This list is accurate as of: 5/12/17  3:34 PM.  Always use your most recent med list.  
  
  
  
  
 alendronate 70 mg tablet Commonly known as:  FOSAMAX Take 70 mg by mouth every seven (7) days. AMBIEN CR 12.5 mg tablet Generic drug:  zolpidem CR Take 12.5 mg by mouth nightly as needed for Sleep. amLODIPine 10 mg tablet Commonly known as:  Meaghan Rafter Take 1 Tab by mouth daily. CELLCEPT PO Take 1 g by mouth two (2) times a day. levothyroxine 150 mcg tablet Commonly known as:  SYNTHROID  
1 qd  MAXALT PO  
 Take  by mouth daily as needed. ORACEA 40 mg capsule Generic drug:  doxycycline monohydrate Take 40 mg by mouth every morning. * oxyCODONE-acetaminophen 5-325 mg per tablet Commonly known as:  PERCOCET Take 1 po q daily - bid prn pain * oxyCODONE-acetaminophen 5-325 mg per tablet Commonly known as:  PERCOCET Take 1 Tab by mouth daily. Max Daily Amount: 1 Tab. predniSONE 5 mg tablet Commonly known as:  [place] Take 5 mg by mouth every other day. RESTASIS 0.05 % ophthalmic emulsion Generic drug:  cycloSPORINE Administer 1 Drop to both eyes two (2) times a day. TOPAMAX 100 mg tablet Generic drug:  topiramate Take  by mouth daily. valACYclovir 1 gram tablet Commonly known as:  VALTREX Take 1 Tab by mouth daily. VALIUM PO Take 5 mg by mouth three (3) times daily as needed. WELLBUTRIN  mg XL tablet Generic drug:  buPROPion XL Take 300 mg by mouth every morning. * Notice: This list has 2 medication(s) that are the same as other medications prescribed for you. Read the directions carefully, and ask your doctor or other care provider to review them with you. Prescriptions Printed Refills  
 oxyCODONE-acetaminophen (PERCOCET) 5-325 mg per tablet 0 Sig: Take 1 Tab by mouth daily. Max Daily Amount: 1 Tab. Class: Print Route: Oral  
  
We Performed the Following REFERRAL TO PHYSICAL THERAPY [ZVJ86 Custom] Comments:  
 DX:R SI 
HEP 
LOCATION:[facility] 2-3 visits/ 2-3 weeks Follow-up Instructions Return in about 4 weeks (around 6/9/2017). Referral Information Referral ID Referred By Referred To  
  
 [id number] AMPARO COLBY III Not Available Visits Status Start Date End Date 1 New Request 5/12/17 5/12/18 If your referral has a status of pending review or denied, additional information will be sent to support the outcome of this decision. Introducing [facility]! [facility] introduces [employer]ident[employer] patient portal. Now you can access parts of your medical record, email your doctor's office, and request medication refills online. 1. In your internet browser, go to [email or web address] 2. Click on the First Time User? Click Here link in the Sign In box. You will see the New Member Sign Up page. 3. Enter your Evident[employer] Access Code exactly as it appears below. You will not need to use this code after youve completed the sign-up process. If you do not sign up before the expiration date, you must request a new code. · [email or web address] Access Code: [email or web address] Expires: 7/16/2017  3:50 PM 
 
4. Enter the last four digits of your Social Security Number (xxxx) and Date of Birth (mm/dd/yyyy) as indicated and click Submit. You will be taken to the next sign-up page. 5. Create a Evident[employer]io ID. This will be your Ev[employer] login ID and cannot be changed, so think of one that is secure and easy to remember. 6. Create a Ev[employer] password. You can change your password at any time. 7. Enter your Password Reset Question and Answer. This can be used at a later time if you forget your password. 8. Enter your e-mail address. You will receive e-mail notification when new information is available in [address] 9. Click Sign Up. You can now view and download portions of your medical record. 10. Click the Download Summary menu link to download a portable copy of your medical information. If you have questions, please visit the Frequently Asked Questions section of the [email or web address] website. Remember, [employer] is NOT to be used for urgent needs. For medical emergencies, dial 911. Now available from your iPhone and Android! Please provide this summary of care documentation to your next provider. Your primary care clinician is listed as Waldemar Torres. If you have any questions after today's visit, please call [phone].

## 2017-05-25 ENCOUNTER — HOSPITAL ENCOUNTER (OUTPATIENT)
Dept: PHYSICAL THERAPY | Age: 62
Discharge: HOME | End: 2017-05-25
Payer: COMMERCIAL

## 2017-05-25 PROCEDURE — 97140 MANUAL THERAPY 1/> REGIONS: CPT

## 2017-05-25 PROCEDURE — 97162 PT EVAL MOD COMPLEX 30 MIN: CPT

## 2017-05-25 PROCEDURE — 97112 NEUROMUSCULAR REEDUCATION: CPT

## 2017-05-25 NOTE — PROGRESS NOTES
Request for use of Dry Needling/Intramuscular Manual Therapy  Patient: Laquita Pompa     Referral Source: Sarah Wise MD  Diagnosis: Chronic right SI joint pain [M53.3, G89.29]      : 1955  Date of initial visit: 2017   Attended visits: 1  Missed Visits: 0    Based on findings from the physical therapy examination and evaluation, the evaluating therapist believes the patient, Laquita Pompa  would benefit from including Dry Needling as part of the plan of care. Dry needling is a treatment technique utilized in conjunction with other PT interventions to inactivate myofascial trigger points and the pain and dysfunction they cause. Dry Needling is an advanced procedure that requires additional training including greater than 54 hours of intensive course work. Physical Therapists at [address] [place] [address] are trained and/or certified through [employer] for their education. PROCEDURE:   Solid filament sterile needle (typically 0.3mm/30 gauge) inserted into a trigger point   Repeated movements inactivate the trigger points, taking 30-60 seconds per site   Typically consists of 1 dry needling session per week and a possible second treatment including muscle re-education, flexibility, strengthening and other manual techniques to facilitate the benefits of dry needling     BENEFITS:   Inactivation of trigger points   Decreased pain   Increased muscle length   Improved movement patterns   Restoration of function POTENTIAL RISKS:   Post-needling soreness   Infection   Bruising/bleeding   Penetration of a nerve   Pneumothorax   All treating PTs have been thoroughly educated in avoiding adverse reactions    If you agree with this recommendation, please sign this form and fax it to us at [phone]. If you have questions or concerns regarding dry needling or any other treatment we may be providing, please contact us at [phone].     Thank you for allowing us to assist in the care of your patient. Garwin Pete, [address] [facility]    5/25/2017 7:10 PM     NOTE TO PHYSICIAN:  PLEASE COMPLETE THE ORDERS BELOW AND   FAX TO [facility]: [phone]  If you are unable to process this request in 24 hours please contact our office:   [phone]    I have read the above request and AGREE to the recommendation of including dry needling as part of the plan of care.       Physicians signature: _________________________Date: _________Time:________

## 2017-05-25 NOTE — PROGRESS NOTES
PT DAILY TREATMENT NOTE 8-    Patient Name: Laquita Pompa  Date:2017  : 1955  [x]  Patient  Verified  Payor: BLUE CROSS / Plan: [address] [place] [address] / Product Type: PPO /    In time:505  Out time:556  Total Treatment Time (min): 51  Visit #: 1 of 8    Treatment Area: Chronic right SI joint pain [M53.3, G89.29]    SUBJECTIVE  Pain Level (0-10 scale): 2  Any medication changes, allergies to medications, adverse drug reactions, diagnosis change, or new procedure performed?: [x] No    [] Yes (see summary sheet for update)  Subjective functional status/changes:   [] No changes reported  Insidious onset right buttock pain 5 months ago. Cannot workout or tolerate inactivity    OBJECTIVE    8 min Manual Therapy:  Leg traction and soft tissue mobilization   Rationale: decrease pain, increase ROM, increase tissue extensibility and decrease trigger points to improve pelvic obliquity  10 min Neuromuscular Re-education:  [x]  See flow sheet :   Rationale: increase strength, improve coordination, improve balance and increase proprioception  to improve the patients ability to properly activate core stabilizers without compensation           With   [] TE   [] TA   [] neuro   [] other: Patient Education: [x] Review HEP    [] Progressed/Changed HEP based on:   [] positioning   [] body mechanics   [] transfers   [] heat/ice application    [] other:      Other Objective/Functional Measures: refer to eval     Pain Level (0-10 scale) post treatment: 0    ASSESSMENT/Changes in Function: Pain was resolved and leg length restored post treatment.     Patient will continue to benefit from skilled PT services to modify and progress therapeutic interventions, address functional mobility deficits, address ROM deficits, address strength deficits, analyze and address soft tissue restrictions, analyze and cue movement patterns, analyze and modify body mechanics/ergonomics and assess and modify postural abnormalities to attain remaining goals. [x]  See Plan of Care  []  See progress note/recertification  []  See Discharge Summary         Progress towards goals:  Short Term Goals: To be accomplished in 2 weeks:  1. Pt will be compliant with HEP to increase soft tissue contractures to address core and pelvic stability and resume working out  2. Pt will present with pelvic symmetry for 2 visits to increase ease of ADLs     Long Term Goals: To be accomplished in 4 weeks:  1. Pt will improve FOTO to 60 to increase ease of ADLs  2. Pt will be able to complete QP strengthening and supine abdominal work on the Oov without compensation to improve core and pelvic stability to resume working out  3.  Pt will improve hip abduction and extension strength to 4/5 (B) to increase ease of ADLs    PLAN  []  Upgrade activities as tolerated     [x]  Continue plan of care  []  Update interventions per flow sheet       []  Discharge due to:_  []  Other:_      Cheko Fish PT 5/25/2017  7:11 PM    Future Appointments  Date Time Provider [facility]   6/5/2017 6:00 PM [facility]   6/7/2017 4:30 PM Marek Nunez [facility]   6/12/2017 4:30 PM Marek Nunez [facility]TA [facility]   6/14/2017 5:00 PM Mague Jacobo PT [facility]   6/22/2017 10:50 AM Raven Quinn  [address]

## 2017-05-25 NOTE — PROGRESS NOTES
[facility]  [address]  [place], [address]  [phone] fax    Plan of Care/ Statement of Necessity for Physical Therapy Services    Patient name: Rogerio Chase Start of Care: 2017   Referral source: Mar Lacey MD : 1955    Medical Diagnosis: Chronic right SI joint pain [M53.3, G89.29]   Onset Date:5 months ago    Treatment Diagnosis: right hip pain   Prior Hospitalization: see medical history Provider#: [id number]   Medications: Verified on Patient summary List    Comorbidities: back pain, OA. Depression, HA, HTN, osteoporosis   Prior Level of Function: Recreational exercises power walking 5-7 miles per day and lifting weights     The Plan of Care and following information is based on the information from the initial evaluation. Assessment/ key information: Pt presents with pain between the right PSIS and trochanter. She has a history of back pain and the pain started 5 months ago. It increases with inactivity and too much activity. Exam finding: right upslip requiring grade 5 manipulation to correct, soft tissue contractures of the QL, TFL, and psoas, and very poor abdominal muscle activation without compensation. PT suggests a trial of dry needling to improve soft mobility for pelvic stabilization  Evaluation Complexity History MEDIUM  Complexity : 1-2 comorbidities / personal factors will impact the outcome/ POC ; Examination MEDIUM Complexity : 3 Standardized tests and measures addressing body structure, function, activity limitation and / or participation in recreation  ;Presentation MEDIUM Complexity : Evolving with changing characteristics  ; Clinical Decision Making MEDIUM Complexity : FOTO score of 26-74  Overall Complexity Rating: MEDIUM  Problem List: pain affecting function, decrease ROM, decrease strength, impaired gait/ balance, decrease ADL/ functional abilitiies, decrease activity tolerance and decrease flexibility/ joint mobility   Treatment Plan may include any combination of the following: Therapeutic exercise, Therapeutic activities, Neuromuscular re-education, Physical agent/modality, Gait/balance training, Manual therapy, Patient education and Other: dry needling and graston PRN  Patient / Family readiness to learn indicated by: asking questions, trying to perform skills and interest  Persons(s) to be included in education: patient (P)  Barriers to Learning/Limitations: None  Patient Goal (s): To be able to workout again  Patient Self Reported Health Status: fair  Rehabilitation Potential: good    Short Term Goals: To be accomplished in 2 weeks:  1. Pt will be compliant with HEP to increase soft tissue contractures to address core and pelvic stability and resume working out  2. Pt will present with pelvic symmetry for 2 visits to increase ease of ADLs    Long Term Goals: To be accomplished in 4 weeks:  1. Pt will improve FOTO to 60 to increase ease of ADLs  2. Pt will be able to complete QP strengthening and supine abdominal work on the Oov without compensation to improve core and pelvic stability to resume working out  3. Pt will improve hip abduction and extension strength to 4/5 (B) to increase ease of ADLs    Frequency / Duration: Patient to be seen 2 times per week for 4 weeks. Patient/ Caregiver education and instruction: Diagnosis, prognosis, self care, activity modification and exercises   [x]  Plan of care has been reviewed with KRYSTIAN Morillo, PT 5/25/2017 7:05 PM    ________________________________________________________________________    I certify that the above Therapy Services are being furnished while the patient is under my care. I agree with the treatment plan and certify that this therapy is necessary.     [de-identified] Signature:____________________  Date:____________Time: _________    Please sign and return to [facility] [address] [facility], [address]  [phone] fax

## 2017-06-05 ENCOUNTER — HOSPITAL ENCOUNTER (OUTPATIENT)
Dept: PHYSICAL THERAPY | Age: 62
Discharge: HOME | End: 2017-06-05
Payer: COMMERCIAL

## 2017-06-05 PROCEDURE — 97140 MANUAL THERAPY 1/> REGIONS: CPT

## 2017-06-05 PROCEDURE — 97112 NEUROMUSCULAR REEDUCATION: CPT

## 2017-06-05 PROCEDURE — 97110 THERAPEUTIC EXERCISES: CPT

## 2017-06-05 NOTE — PROGRESS NOTES
PT DAILY TREATMENT NOTE     Patient Name: Jesenia Payer  Date:2017  : 1955  [x]  Patient  Verified  Payor: Bell Rai / Plan: [address] [place] [address] / Product Type: PPO /    In time:600  Out time:640  Total Treatment Time (min): 40  Visit #: 2 of 8    Treatment Area: Chronic right SI joint pain [M53.3, G89.29]    SUBJECTIVE  Pain Level (0-10 scale): 0  Any medication changes, allergies to medications, adverse drug reactions, diagnosis change, or new procedure performed?: [x] No    [] Yes (see summary sheet for update)  Subjective functional status/changes:   [] No changes reported  I feel okay. It's my left that feels off today. My right feels fine. Pt reports no pain between right PSIS and right trochanter. OBJECTIVE  24 min Neuromuscular Re-education:  [x]  See flow sheet :   Rationale: increase strength, improve coordination, improve balance and increase proprioception  to improve the patients ability to reduce psoas compensations and increase lower abdominal and glut activation   8 min Manual Therapy:  QL and psoas release (B)   Rationale: decrease pain, increase ROM, increase tissue extensibility and decrease trigger points to facilitate core activation and alleviate compensations  8 min Therapeutic Exercise:  [x] See flow sheet :   Rationale: increase ROM to improve the patients ability to improve QL length (B)            With   [] TE   [] TA   [] neuro   [] other: Patient Education: [x] Review HEP    [] Progressed/Changed HEP based on:   [] positioning   [] body mechanics   [] transfers   [] heat/ice application    [] other:      Other Objective/Functional Measures: Pt excelled on the Oov able to complete exercises without compensation but was challenged and reached volitional fatigue quickly on each exercise. Pt was corrected -her left illiac crest was up and easily corrected.   Pt maintained correction throughout treatment     Pain Level (0-10 scale) post treatment: 0    ASSESSMENT/Changes in Function: No pain post treatment. Pt was able to mobilize fascia thru program after starting with core work. DN orders are signed. Pt will also benefit from graston - will attempt next visit    Patient will continue to benefit from skilled PT services to modify and progress therapeutic interventions, address functional mobility deficits, address ROM deficits, address strength deficits, analyze and address soft tissue restrictions, analyze and cue movement patterns, analyze and modify body mechanics/ergonomics, assess and modify postural abnormalities and address imbalance/dizziness to attain remaining goals. []  See Plan of Care  []  See progress note/recertification  []  See Discharge Summary         Progress towards goals / Updated goals:  Short Term Goals: To be accomplished in 2 weeks:  1. Pt will be compliant with HEP to increase soft tissue contractures to address core and pelvic stability and resume working out  Met-  Compliant with HEP - initiated program on left because she \"felt off\" and patient was correct 6/5/2017  2. Pt will present with pelvic symmetry for 2 visits to increase ease of ADLs      Long Term Goals: To be accomplished in 4 weeks:  1. Pt will improve FOTO to 60 to increase ease of ADLs  2. Pt will be able to complete QP strengthening and supine abdominal work on the Oov without compensation to improve core and pelvic stability to resume working out  3.  Pt will improve hip abduction and extension strength to 4/5 (B) to increase ease of ADLs    PLAN  []  Upgrade activities as tolerated     []  Continue plan of care  []  Update interventions per flow sheet       []  Discharge due to:_  []  Other:_      Danny Olivera PT 6/5/2017  6:43 PM    Future Appointments  Date Time Provider Paulette Tello   6/7/2017 4:30 PM [facility]   6/12/2017 4:30 PM Elsa Clamp, PTA [facility]   6/14/2017 5:00 PM Willie Ann PT [facility]   6/22/2017 10:50 AM Kristine Lerma  [address]

## 2017-06-07 ENCOUNTER — HOSPITAL ENCOUNTER (OUTPATIENT)
Dept: PHYSICAL THERAPY | Age: 62
Discharge: HOME | End: 2017-06-07
Payer: COMMERCIAL

## 2017-06-07 PROCEDURE — 97140 MANUAL THERAPY 1/> REGIONS: CPT

## 2017-06-07 PROCEDURE — 97112 NEUROMUSCULAR REEDUCATION: CPT

## 2017-06-07 NOTE — PROGRESS NOTES
PT DAILY TREATMENT NOTE     Patient Name: Dmitry Fish  Date:2017  : 1955  [x]  Patient  Verified  Payor: Tarun Shazia / Plan: [address] [place] [address] / Product Type: PPO /    In time:4:33  Out time:5:10  Total Treatment Time (min): 37  Visit #: 3 of 8    Treatment Area: Chronic right SI joint pain [M53.3, G89.29]    SUBJECTIVE  Pain Level (0-10 scale): 2-3/10  Any medication changes, allergies to medications, adverse drug reactions, diagnosis change, or new procedure performed?: [x] No    [] Yes (see summary sheet for update)  Subjective functional status/changes:   [] No changes reported  \"Kind of sore on both sides, the right a little more so. \"    OBJECTIVE    29 min Neuromuscular Re-education:  [x]  See flow sheet :   Rationale: increase ROM, increase strength and increase proprioception  to improve the patients ability to perform functional activities. 8 min Manual Therapy:  LE distraction, MET and shotgun technique to correct (R) SI anterior rotation and (R) SI upslip. Rationale: decrease pain, increase ROM and increase tissue extensibility to improve activity tolerance. With   [x] TE   [] TA   [] neuro   [] other: Patient Education: [x] Review HEP    [] Progressed/Changed HEP based on:   [] positioning   [] body mechanics   [] transfers   [] heat/ice application    [] other:      Other Objective/Functional Measures: Cueing to correct TA recruitment, significant rib flaring with TA contraction. Pain Level (0-10 scale) post treatment: 1/10    ASSESSMENT/Changes in Function: Pt reported a decrease in pain and expressed improved knowledge about core strengthening.     Patient will continue to benefit from skilled PT services to modify and progress therapeutic interventions, address functional mobility deficits, address ROM deficits, address strength deficits, analyze and address soft tissue restrictions and analyze and modify body mechanics/ergonomics to attain remaining goals. [x]  See Plan of Care  []  See progress note/recertification  []  See Discharge Summary         Progress towards goals / Updated goals:  Short Term Goals: To be accomplished in 2 weeks:  1. Pt will be compliant with HEP to increase soft tissue contractures to address core and pelvic stability and resume working out Met- Compliant with HEP - initiated program on left because she \"felt off\" and patient was correct 6/5/2017  2. Pt will present with pelvic symmetry for 2 visits to increase ease of ADLs - Not met. 6/7/2017  Long Term Goals: To be accomplished in 4 weeks:  1. Pt will improve FOTO to 60 to increase ease of ADLs  2. Pt will be able to complete QP strengthening and supine abdominal work on the Oov without compensation to improve core and pelvic stability to resume working out  3.  Pt will improve hip abduction and extension strength to 4/5 (B) to increase ease of ADLs    PLAN  []  Upgrade activities as tolerated     [x]  Continue plan of care  []  Update interventions per flow sheet       []  Discharge due to:_  []  Other:_      Bartolome Cedeno PTA 6/7/2017  4:42 PM    Future Appointments  Date Time Provider Paulette Tello   6/12/2017 4:30 PM [facility]   6/14/2017 5:00 PM Ainsworth Carmine, [facility]   6/22/2017 10:50 AM Margi Glass  [address]

## 2017-06-12 ENCOUNTER — HOSPITAL ENCOUNTER (OUTPATIENT)
Dept: PHYSICAL THERAPY | Age: 62
Discharge: HOME | End: 2017-06-12
Payer: COMMERCIAL

## 2017-06-12 PROCEDURE — 97140 MANUAL THERAPY 1/> REGIONS: CPT

## 2017-06-12 PROCEDURE — 97112 NEUROMUSCULAR REEDUCATION: CPT

## 2017-06-12 NOTE — PROGRESS NOTES
PT DAILY TREATMENT NOTE     Patient Name: Abdoulaye Hudson  Date:2017  : 1955  [x]  Patient  Verified  Payor: Carla Newell / Plan: [address] [place] [address] / Product Type: PPO /    In time:4:30  Out time:5:09  Total Treatment Time (min): 39  Visit #: 4 of 8    Treatment Area: Chronic right SI joint pain [M53.3, G89.29]    SUBJECTIVE  Pain Level (0-10 scale): 3-4/10  Any medication changes, allergies to medications, adverse drug reactions, diagnosis change, or new procedure performed?: [x] No    [] Yes (see summary sheet for update)  Subjective functional status/changes:   [] No changes reported  \"I've been hurting, I feel like I should be getting better. \"    OBJECTIVE    31 min Neuromuscular Re-education:  [x]  See flow sheet :   Rationale: increase strength and increase proprioception  to improve the patients ability to perform functional activities. 8 min Manual Therapy:  LE distraction to correct (R) SI upslip. Grade 4-5 thrust to SI to correct upslip. DTM/TPR (B) Ql and L/S paraspinals. Rationale: decrease pain, increase ROM, increase tissue extensibility and decrease trigger points to improve activity tolerance. With   [x] TE   [] TA   [] neuro   [] other: Patient Education: [x] Review HEP    [] Progressed/Changed HEP based on:   [] positioning   [] body mechanics   [] transfers   [] heat/ice application    [] other:      Other Objective/Functional Measures: Restrictions in (B) QL and L/S paraspinals. Pain Level (0-10 scale) post treatment: 0/10    ASSESSMENT/Changes in Function: Pain decrease noted after treatment. Limited progress overall at this time per pt. Patient will continue to benefit from skilled PT services to modify and progress therapeutic interventions, address functional mobility deficits, address ROM deficits, address strength deficits, analyze and address soft tissue restrictions and analyze and modify body mechanics/ergonomics to attain remaining goals. [x]  See Plan of Care  []  See progress note/recertification  []  See Discharge Summary         Progress towards goals / Updated goals:  Short Term Goals: To be accomplished in 2 weeks:  1. Pt will be compliant with HEP to increase soft tissue contractures to address core and pelvic stability and resume working out Met- Compliant with HEP - initiated program on left because she \"felt off\" and patient was correct 6/5/2017  2. Pt will present with pelvic symmetry for 2 visits to increase ease of ADLs - Not met. 6/7/2017  Long Term Goals: To be accomplished in 4 weeks:  1. Pt will improve FOTO to 60 to increase ease of ADLs  2. Pt will be able to complete QP strengthening and supine abdominal work on the Oov without compensation to improve core and pelvic stability to resume working out  3.  Pt will improve hip abduction and extension strength to 4/5 (B) to increase ease of ADLs    PLAN  []  Upgrade activities as tolerated     [x]  Continue plan of care  []  Update interventions per flow sheet       []  Discharge due to:_  []  Other:_      Nikkie Osullivan PTA 6/12/2017  4:28 PM    Future Appointments  Date Time Provider Paulette Tello   6/12/2017 4:30 PM [facility]   6/14/2017 5:00 PM Sindy Mo PT [facility]   6/22/2017 10:50 AM Mimi Hopson  [address]

## 2017-06-14 ENCOUNTER — HOSPITAL ENCOUNTER (OUTPATIENT)
Dept: PHYSICAL THERAPY | Age: 62
Discharge: HOME | End: 2017-06-14
Payer: COMMERCIAL

## 2017-06-14 PROCEDURE — 97112 NEUROMUSCULAR REEDUCATION: CPT

## 2017-06-14 PROCEDURE — 97110 THERAPEUTIC EXERCISES: CPT

## 2017-06-14 NOTE — PROGRESS NOTES
PT DAILY TREATMENT NOTE     Patient Name: Jaskaran Enoc  Date:2017  : 1955  [x]  Patient  Verified  Payor: BLUE CROSS / Plan: [address] [place] [address] / Product Type: PPO /    In time:505  Out time:549  Total Treatment Time (min): 44  Visit #: 5 of 8    Treatment Area: Chronic right SI joint pain [M53.3, G89.29]    SUBJECTIVE  Pain Level (0-10 scale): 0  Any medication changes, allergies to medications, adverse drug reactions, diagnosis change, or new procedure performed?: [x] No    [] Yes (see summary sheet for update)  Subjective functional status/changes:   [] No changes reported  Feels very tight and irritated.     OBJECTIVE    Modality rationale: decrease pain to improve the patients ability to tolerate post needle soreness   Min Type Additional Details    [] Estim:  []Unatt       []IFC  []Premod                        []Other:  []w/ice   []w/heat  Position:  Location:    [] Estim: []Att    []TENS instruct  []NMES                    []Other:  []w/US   []w/ice   []w/heat  Position:  Location:    []  Traction: [] Cervical       []Lumbar                       [] Prone          []Supine                       []Intermittent   []Continuous Lbs:  [] before manual  [] after manual    []  Ultrasound: []Continuous   [] Pulsed                           []1MHz   []3MHz W/cm2:  Location:    []  Iontophoresis with dexamethasone         Location: [] Take home patch   [] In clinic   10 [x]  Ice     []  heat  []  Ice massage  []  Laser   []  Anodyne Position:prone  Location:B lgutes    []  Laser with stim  []  Other:  Position:  Location:    []  Vasopneumatic Device Pressure:       [] lo [] med [] hi   Temperature: [] lo [] med [] hi   [x] Skin assessment post-treatment:  []intact []redness- no adverse reaction        8 min Therapeutic Exercise:  [] See flow sheet :   Rationale: increase ROM to improve the patients ability to increase ease with daily activities     26 min Neuromuscular Re-education: []  See flow sheet :DN- see below   Rationale: increase ROM, increase strength, improve coordination, improve balance and increase proprioception  to improve the patients postural stability and to neutralize alignment  Dry Needling Procedure Note    Procedure: An intramuscular manual therapy (dry needling) and a neuro-muscular re-education treatment was done to deactivate myofascial trigger points with a 30 gauge filament needle under aseptic technique. Indications:  [x] Myofascial pain and dysfunction [] Muscled spasms  [] Myalgia/myositis   [] Muscle cramps  [x] Muscle imbalances  [] Other:    Chart reviewed for the following:  Nicolasa CAMPOS PT, have reviewed the medical history, summary list and precautions/contraindications for [employer].   TIME OUT performed immediately prior to start of procedure:  Nicolasa CAMPOS PT, have performed the following reviews on [employer] prior to the start of the session:      [x] Verified patient identification by name and date of birth    [x] Agreement on all muscles being treated was verified   [x] Purpose of dry needling, side effects, possible complications, risks and benefits were explained to the patient   [x] Procedure site(s) verified  [x] Patient was positioned for comfort and draped for privacy  [x] Informed Consent was signed (initial visit) and verified verbally (subsequent visits)  [x] Patient was instructed on the need to report the use of blood thinners and/or immunosuppressant medications  [x] How to respond to possible adverse effects of treatment  [x] Self treatment of post needling soreness: ice, heat (moist heat, heat wraps) and stretching  [x] Opportunity was given to ask any questions, all questions were answered            Time: 509  Date of procedure: 6/14/2017    Treatment: The following muscles were treated today with intramuscular dry needling  [] Left [] Right Abdominals: Ant Rectus Abdominis  [] Left [] Right External Oblique / Internal Oblique / Transverse Abdominis  [] Left [] Right Thoracic Multifidi / Rotatores  [] Left [] Right Iliocostalis Thoracis / Lumborum  [] Left [] Right Longissimus Thoracis / Lumborum  [] Left [] Right Lumbar Multifidi  [] Left [] Right Serratus Posterior Inferior  [] Left [x] Right Quadratus Lumborum  [] Left [] Right Psoas  [] Left [] Right Iliacus  [] Left [] Right Iliopsoas (inguinal)  [] Left [] Right Piriformis  [] Left [] Right Quadratus Femoris  [x] Left [x] Right [place] / Bhavani Fire / Capo  [] Left [] Right Obturator Internus  [] Left [] Right Obturator Externus / Caroline Stringer / Inferior Gemellus    [] Left [] Right Tensor Fasciae Mitali  [] Left [] Right Iliotibial Band  [] Left [] Right Pectineus  [] Left [] Right Sartorius  [] Left [] Right Gracilis  [] Left [] Right Adductor Brevis / Adductor Longus / Adductor Jas  [] Left [] Right Rectus Femoris / Vastus Lateralis / Vastus Intermedius / Vastus Medialis Obliquus  [] Left [] Right Tibialis Anterior / Posterior  [] Left [] Right Extensor Digitorum Longus / Brevis  [] Left [] Right Extensor Hallucis Longus / Brevis  [] Left [] Right Hamstrings: Biceps Femoris / Finis Millicent / Semimembranosis  [] Left [] Right Popliteus / Planteris  [] Left [] Right Gastrocnemius Medial / Lateral  [] Left [] Right Soleus  [] Left [] Right Peronei: Longus / Katie Hock / Tertius  [] Left [] Right Flexor Digitorum Longus / Brevis  [] Left [] Right Flexor Hallucis Longus / Brevis  [] Left [] Right Quadratus Plantae  [] Left [] Right Abductor Digiti Minimi  [] Left [] Right Foot Interossei  [] Left [] Right Other:    Patient's response to today's tr Muscle relaxation [x] Pain Relief  [x] Post needling soreness eatment:  [x] Latent Twitch Response  [x][x] without complications  [x] Increased Range of Motion  [] Other:     Performed by: Valentín Lou PT            With   [] TE   [] TA   [] neuro   [] other: Patient Education: [x] Review HEP    [] Progressed/Changed HEP based on:   [] positioning   [] body mechanics   [] transfers   [] heat/ice application    [] other:      Other Objective/Functional Measures: R upslip/L ant     Pain Level (0-10 scale) post treatment: 0-1    ASSESSMENT/Changes in Function: + response to all DN today. Significant amount of twitches in B glutes (superior portion) and R QL. Patient will continue to benefit from skilled PT services to modify and progress therapeutic interventions, address functional mobility deficits, address ROM deficits, address strength deficits, analyze and address soft tissue restrictions, analyze and cue movement patterns and assess and modify postural abnormalities to attain remaining goals. []  See Plan of Care  []  See progress note/recertification  []  See Discharge Summary         Progress towards goals / Updated goals:  Short Term Goals: To be accomplished in 2 weeks:  1. Pt will be compliant with HEP to increase soft tissue contractures to address core and pelvic stability and resume working out Met- Compliant with HEP - initiated program on left because she \"felt off\" and patient was correct 6/5/2017  2. Pt will present with pelvic symmetry for 2 visits to increase ease of ADLs - Not met. 6/7/2017; no change 6/14/17  Long Term Goals: To be accomplished in 4 weeks:  1. Pt will improve FOTO to 60 to increase ease of ADLs  2. Pt will be able to complete QP strengthening and supine abdominal work on the Oov without compensation to improve core and pelvic stability to resume working out  3.  Pt will improve hip abduction and extension strength to 4/5 (B) to increase ease of ADLs       PLAN  []  Upgrade activities as tolerated     []  Continue plan of care  []  Update interventions per flow sheet       []  Discharge due to:_  []  Other:_      Cruz Le, PT 6/14/2017  6:27 PM    Future Appointments  Date Time Provider Paulette Tello   6/20/2017 10:30 AM Joel Randle PTA [facility] 6/22/2017 10:50 AM Mirella Torres  [address]   6/23/2017 9:00 AM Surinder Forrest, PT [facility]   6/26/2017 8:00 AM Shivani Martin, PTA [facility]   6/28/2017 4:00 PM Surinder Forrest, PT [facility]   7/3/2017 8:00 AM Shivani Martin, KRYSTIAN [facility]   7/6/2017 10:30 AM Surinder Forrest, PT [facility]

## 2017-06-20 ENCOUNTER — HOSPITAL ENCOUNTER (OUTPATIENT)
Dept: PHYSICAL THERAPY | Age: 62
Discharge: HOME | End: 2017-06-20
Payer: COMMERCIAL

## 2017-06-20 PROCEDURE — 97112 NEUROMUSCULAR REEDUCATION: CPT

## 2017-06-20 PROCEDURE — 97140 MANUAL THERAPY 1/> REGIONS: CPT

## 2017-06-20 PROCEDURE — 97110 THERAPEUTIC EXERCISES: CPT

## 2017-06-20 NOTE — PROGRESS NOTES
PT DAILY TREATMENT NOTE     Patient Name: Gia Viera  Date:2017  : 1955  [x]  Patient  Verified  Payor: FADI Garland / Plan: [address] [place] [address] / Product Type: PPO /    In time:10:30  Out time:11:18  Total Treatment Time (min): 48  Visit #: 6 of 8    Treatment Area: Chronic right SI joint pain [M53.3, G89.29]    SUBJECTIVE  Pain Level (0-10 scale): 0/10  Any medication changes, allergies to medications, adverse drug reactions, diagnosis change, or new procedure performed?: [x] No    [] Yes (see summary sheet for update)  Subjective functional status/changes:   [] No changes reported  \"Left is a little tight but I'm feeling better. \"    OBJECTIVE    8 min Therapeutic Exercise:  [x] See flow sheet :   Rationale: increase ROM to improve the patients ability to perform ADL's.    32 min Neuromuscular Re-education:  [x]  See flow sheet :   Rationale: increase strength and increase proprioception  to improve the patients ability to perform functional activities. 8 min Manual Therapy:  DTM/TPR (B) QL and piriformis. Level SI alignment. Rationale: decrease pain, increase ROM, increase tissue extensibility and decrease trigger points to improve activity tolerance. With   [x] TE   [] TA   [] neuro   [] other: Patient Education: [x] Review HEP    [] Progressed/Changed HEP based on:   [] positioning   [] body mechanics   [] transfers   [] heat/ice application    [] other:      Other Objective/Functional Measures:      Pain Level (0-10 scale) post treatment: 0/10    ASSESSMENT/Changes in Function: Poor (L) hip stability during (R) march on OOV. FOTO 76%. Patient will continue to benefit from skilled PT services to modify and progress therapeutic interventions, address functional mobility deficits, address ROM deficits, address strength deficits, analyze and address soft tissue restrictions and analyze and modify body mechanics/ergonomics to attain remaining goals.      [x]  See Plan of Care  []  See progress note/recertification  []  See Discharge Summary         Progress towards goals / Updated goals:  Short Term Goals: To be accomplished in 2 weeks:  1. Pt will be compliant with HEP to increase soft tissue contractures to address core and pelvic stability and resume working out Met- Compliant with HEP - initiated program on left because she \"felt off\" and patient was correct 6/5/2017  2. Pt will present with pelvic symmetry for 2 visits to increase ease of ADLs - Not met. 6/7/2017; no change 6/14/17  Long Term Goals: To be accomplished in 4 weeks:  1. Pt will improve FOTO to 60 to increase ease of ADLs - FOTO 76%. 6/20/2017  2. Pt will be able to complete QP strengthening and supine abdominal work on the Oov without compensation to improve core and pelvic stability to resume working out  3.  Pt will improve hip abduction and extension strength to 4/5 (B) to increase ease of ADLs    PLAN  []  Upgrade activities as tolerated     [x]  Continue plan of care  []  Update interventions per flow sheet       []  Discharge due to:_  []  Other:_      Walter Wilkerson PTA 6/20/2017  10:39 AM    Future Appointments  Date Time Provider Paulette Elisha   6/22/2017 10:50 AM Chelsey Neves  [address]    6/23/2017 9:00 AM Malena Gonzalez PT [facility]   6/26/2017 8:00 AM Walter Wilkerson PTA [facility]   6/28/2017 4:00 PM Malena Gonzalez PT [facility]   7/3/2017 8:00 AM Walter Wilkerson PTA [facility]   7/6/2017 10:30 AM Malena Gonzalez PT [facility]

## 2017-06-22 ENCOUNTER — OFFICE VISIT (OUTPATIENT)
Dept: ORTHOPEDIC SURGERY | Age: 62
End: 2017-06-22

## 2017-06-22 VITALS — HEART RATE: 82 BPM | HEIGHT: 62 IN | SYSTOLIC BLOOD PRESSURE: 114 MMHG | DIASTOLIC BLOOD PRESSURE: 77 MMHG

## 2017-06-22 DIAGNOSIS — M46.1 SACROILIITIS (HCC): Primary | ICD-10-CM

## 2017-06-22 RX ORDER — DOXYCYCLINE HYCLATE 100 MG
100 TABLET ORAL 2 TIMES DAILY
COMMUNITY
Start: 2017-05-10

## 2017-06-22 RX ORDER — POLYETHYLENE GLYCOL 3350 17 G/17G
POWDER, FOR SOLUTION ORAL
Refills: 1 | COMMUNITY
Start: 2017-05-30

## 2017-06-22 RX ORDER — LIDOCAINE 50 MG/G
PATCH TOPICAL
Qty: 30 EACH | Refills: 1 | Status: SHIPPED | OUTPATIENT
Start: 2017-06-22 | End: 2017-08-10 | Stop reason: SDUPTHER

## 2017-06-22 NOTE — PROGRESS NOTES
[facility]  [address], [address]  Phone: [phone]  Fax: [phone]        PROGRESS NOTE      HISTORY OF PRESENT ILLNESS:  The patient is a 62 y.o. female and was seen today for follow up of recurrence of right buttock pain extending in an S1 distribution into the knee. She denies symptoms in the LLE at this time. She described symptoms consistent for stenosis. Pt states she has been working again. Her pain is not positional. Examination continues to be consistent with sacroiliitis on the right. She denies hx of lumbar spinal surgery. Pt received lumbar blocks back in 2012 with good relief. Pt underwent right SI joint injection on 4/20/17 with temporary relief x 10 days. She has attended physical therapy and a chiropractor in the past with benefit. Pt reports she has been completing her HEP with benefit. She is taking Percocet prn for pain relief. Pt states she d/c'd Fosamax due to GI upset. She is currently taking Topamax 100 mg BID. 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 Noted, patient is followed by an endocrinologist in [place]. Pt denies h/o DM. Preliminary reading of lumbosacral plain films revealed mild disc space narrowing L5-S1. Anterior osteophytes L5. No acute pathology identified. Lumbar spine MRI dated 1/30/17 reviewed. Per report, there was multilevel degenerative changes with mild areas of canal and foraminal encroachment. Left lateral disc osteophyte complexes noted mildly contacting and displacing the exiting left L5 nerve root. Small right paracentral disc protrusion at L1-2. Pelvic MRI dated 1/30/17 was reviewed. Per report, no acute findings. Mild bilateral SI joint osteoarthritic changes with evidence of prior healed insufficiency fracture along the upper anterior aspect of the right sacral ala. Lower lumbar spondylosis at L4-L5 and L5-S1.  At her last clinical appointment, she gained temporary relief with right SI joint injection from 4/20/17. Patient wished to defer additional right SI joint injection at that time. I discussed the option of a sacroiliac joint fusion. I refilled her Percocet 5-325 mg. I referred her to physical therapy with an emphasis on HEP. The patient returns today with pain location and distribution remain unchanged. She rates pain 1-5/10, an improvement since her last visit (6-7/10). Pt is accompanied by her  today. Therapy notes reviewed. She continues to be enrolled in physical therapy with good relief. Pt continues with occasional use of Percocet 5-325 mg and states she has used 18-19 tabs since her last visit.  reviewed. Past Medical History:   Diagnosis Date    Arthritis     Back pain     Bursitis of right shoulder     subacromial    Degenerative arthritis of right knee     Depression     DVT of leg (deep venous thrombosis) ([facility]) 7/2015    (L) ankle fx    GERD (gastroesophageal reflux disease)     Herniated disc     Herpes simplex     genital    Hypertension     IBS (irritable bowel syndrome)     Lower extremity pain, bilateral     Migraine headache     Pemphigus vulgaris     Right knee pain     Right shoulder pain     Rosacea     Thyroid disease     Venous (peripheral) insufficiency     Wears glasses         Social History     Social History    Marital status:      Spouse name: N/A    Number of children: N/A    Years of education: N/A     Occupational History    Not on file.      Social History Main Topics    Smoking status: Never Smoker    Smokeless tobacco: Never Used    Alcohol use 0.0 oz/week     0 Standard drinks or equivalent per week      Comment: One glass of wine per month    Drug use: No    Sexual activity: Not on file     Other Topics Concern    Not on file     Social History Narrative       Current Outpatient Prescriptions   Medication Sig Dispense Refill    polyethylene glycol (MIRALAX) 17 gram/dose powder   1    doxycycline (VIBRA-TABS) 100 mg tablet       lidocaine (LIDODERM) 5 % Apply patch to the affected area for 12 hours a day and remove for 12 hours a day. 30 Each 1    oxyCODONE-acetaminophen (PERCOCET) 5-325 mg per tablet Take 1 Tab by mouth daily. Max Daily Amount: 1 Tab. 30 Tab 0    alendronate (FOSAMAX) 70 mg tablet Take 70 mg by mouth every seven (7) days.  amLODIPine (NORVASC) 10 mg tablet Take 1 Tab by mouth daily. 90 Tab 3    valACYclovir (VALTREX) 1 gram tablet Take 1 Tab by mouth daily. 90 Tab 3    levothyroxine (SYNTHROID) 150 mcg tablet 1 qd 90 Tab 3    predniSONE (DELTASONE) 5 mg tablet Take 5 mg by mouth every other day.  MYCOPHENOLATE MOFETIL (CELLCEPT PO) Take 1 g by mouth two (2) times a day.  buPROPion XL (WELLBUTRIN XL) 300 mg XL tablet Take 300 mg by mouth every morning.  topiramate (TOPAMAX) 100 mg tablet Take  by mouth daily.  RIZATRIPTAN BENZOATE (MAXALT PO) Take  by mouth daily as needed.  zolpidem CR (AMBIEN CR) 12.5 mg tablet Take 12.5 mg by mouth nightly as needed for Sleep.  DIAZEPAM (VALIUM PO) Take 5 mg by mouth three (3) times daily as needed.  cycloSPORINE (RESTASIS) 0.05 % ophthalmic emulsion Administer 1 Drop to both eyes two (2) times a day.  oxyCODONE-acetaminophen (PERCOCET) 5-325 mg per tablet Take 1 po q daily - bid prn pain 40 Tab 0    doxycycline monohydrate (ORACEA) 40 mg capsule Take 40 mg by mouth every morning. Allergies   Allergen Reactions    Sulfa (Sulfonamide Antibiotics) Hives          PHYSICAL EXAMINATION    Visit Vitals    /77    Pulse 82    Ht 5' 2\" (1.575 m)       CONSTITUTIONAL: NAD, A&O x 3  SENSATION: Intact to light touch throughout  RANGE OF MOTION: The patient has full passive range of motion in all four extremities.   MOTOR:  Straight Leg Raise: Negative, bilateral   MARTINA SIGN: Mildly positive, bilateral (R>L)               Hip Flex Knee Ext Knee Flex Ankle DF GTE Ankle PF Tone   Right +4/5 +4/5 +4/5 +4/5 +4/5 +4/5 +4/5   Left +4/5 +4/5 +4/5 +4/5 +4/5 +4/5 +4/5       ASSESSMENT   Gayle Huynh was seen today for follow-up. Diagnoses and all orders for this visit:    Sacroiliitis ([address]  -     lidocaine (LIDODERM) 5 %; Apply patch to the affected area for 12 hours a day and remove for 12 hours a day. IMPRESSION AND PLAN:  She continues to demonstrate SI joint dysfunction. Patient is not interested in additional blocks or surgical intervention. I advise her to complete physical therapy as prescribed and continue with her HEP daily. I will give her a Rx for Lidoderm patches. I will see the patient back in 6 week's time or earlier if needed. Written by Gogo Ruiz, as dictated by Nicole Estrada MD  I examined the patient, reviewed and agree with the note.

## 2017-06-22 NOTE — MR AVS SNAPSHOT
Visit Information Date & Time Provider Department Dept. Phone Encounter #  
 6/22/2017 10:50 AM Ivory Maurice MD [facility] [phone] [id number] Follow-up Instructions Return in about 6 weeks (around 8/3/2017). Follow-up and Disposition History Upcoming Health Maintenance Date Due Hepatitis C Screening 1955 ZOSTER VACCINE AGE 60> 6/23/2016 PAP AKA CERVICAL CYTOLOGY 5/1/2017 INFLUENZA AGE 9 TO ADULT 8/1/2017 BREAST CANCER SCRN MAMMOGRAM 10/11/2018 DTaP/Tdap/Td series (2 - Td) 1/1/2022 COLONOSCOPY 3/21/2022 Allergies as of 6/22/2017  Review Complete On: 6/22/2017 By: Ivory Maurice MD  
  
 Severity Noted Reaction Type Reactions Sulfa (Sulfonamide Antibiotics)  05/06/2015    Hives Current Immunizations  Never Reviewed Name Date Pneumococcal Polysaccharide (PPSV-23) 7/1/2012 Tdap 1/1/2012 Not reviewed this visit You Were Diagnosed With   
  
 Codes Comments Sacroiliitis ([facility]    -  Primary ICD-10-CM: M46.1 ICD-9-CM: 720.2 Vitals BP Pulse Height(growth percentile) OB Status Smoking Status 114/77 82 5' 2\" (1.575 m) Postmenopausal Never Smoker Vitals History Preferred Pharmacy Pharmacy Name Phone [facility] [address], [address] [place] Forrest [phone] Your Updated Medication List  
  
   
This list is accurate as of: 6/22/17 12:07 PM.  Always use your most recent med list.  
  
  
  
  
 alendronate 70 mg tablet Commonly known as:  FOSAMAX Take 70 mg by mouth every seven (7) days. AMBIEN CR 12.5 mg tablet Generic drug:  zolpidem CR Take 12.5 mg by mouth nightly as needed for Sleep. amLODIPine 10 mg tablet Commonly known as:  Holli Fast Take 1 Tab by mouth daily. CELLCEPT PO Take 1 g by mouth two (2) times a day. doxycycline 100 mg tablet Commonly known as:  VIBRA-TABS levothyroxine 150 mcg tablet Commonly known as:  SYNTHROID  
1 qd  
  
 lidocaine 5 % Commonly known as:  David Brush Apply patch to the affected area for 12 hours a day and remove for 12 hours a day. MAXALT PO Take  by mouth daily as needed. ORACEA 40 mg capsule Generic drug:  doxycycline monohydrate Take 40 mg by mouth every morning. * oxyCODONE-acetaminophen 5-325 mg per tablet Commonly known as:  PERCOCET Take 1 po q daily - bid prn pain * oxyCODONE-acetaminophen 5-325 mg per tablet Commonly known as:  PERCOCET Take 1 Tab by mouth daily. Max Daily Amount: 1 Tab. polyethylene glycol 17 gram/dose powder Commonly known as:  MIRALAX  
  
 predniSONE 5 mg tablet Commonly known as:  Larrie Doom Take 5 mg by mouth every other day. RESTASIS 0.05 % ophthalmic emulsion Generic drug:  cycloSPORINE Administer 1 Drop to both eyes two (2) times a day. TOPAMAX 100 mg tablet Generic drug:  topiramate Take  by mouth daily. valACYclovir 1 gram tablet Commonly known as:  VALTREX Take 1 Tab by mouth daily. VALIUM PO Take 5 mg by mouth three (3) times daily as needed. WELLBUTRIN  mg XL tablet Generic drug:  buPROPion XL Take 300 mg by mouth every morning. * Notice: This list has 2 medication(s) that are the same as other medications prescribed for you. Read the directions carefully, and ask your doctor or other care provider to review them with you. Prescriptions Sent to Pharmacy Refills  
 lidocaine (LIDODERM) 5 % 1 Sig: Apply patch to the affected area for 12 hours a day and remove for 12 hours a day. Class: Normal  
 [facility]acy: [id number], [address] [place] [address] Ph #: [phone] Follow-up Instructions Return in about 6 weeks (around 8/3/2017). To-Do List   
 06/23/2017 9:00 AM  
  Appointment with Sindy Mo PT at [address] ([phone])  
  
 06/26/2017 8:00 AM  
  Appointment with Nikkie Osullivan, PTA at [facility]  [facility] [address] ([phone])  
  
 06/28/2017 4:00 PM  
  Appointment with Sindy Mo, PT at [address] [facility] ([phone])  
  
 07/03/2017 8:00 AM  
  Appointment with Nikkie Osullivan PTA at [address] [facility] ([phone])  
  
 07/06/2017 10:30 AM  
  Appointment with Sindy Mo, PT at [facility]  [facility] [address] ([phone]) Introducing [facility] SERVICES! Fanny Lau introduces [employer] patient portal. Now you can access parts of your medical record, email your doctor's office, and request medication refills online. 1. In your internet browser, go to [email or web address] 2. Click on the First Time User? Click Here link in the Sign In box. You will see the New Member Sign Up page. 3. Enter your [employer] Access Code exactly as it appears below. You will not need to use this code after youve completed the sign-up process. If you do not sign up before the expiration date, you must request a new code. · [employer] Access Code: [email or web address] Expires: 7/16/2017  3:50 PM 
 
4. Enter the last four digits of your Social Security Number (xxxx) and Date of Birth (mm/dd/yyyy) as indicated and click Submit. You will be taken to the next sign-up page. 5. Create a [employer] ID. This will be your [employer] login ID and cannot be changed, so think of one that is secure and easy to remember. 6. Create a [employer] password. You can change your password at any time. 7. Enter your Password Reset Question and Answer. This can be used at a later time if you forget your password. 8. Enter your e-mail address. You will receive e-mail notification when new information is available in [address] 9. Click Sign Up. You can now view and download portions of your medical record. 10. Click the Download Summary menu link to download a portable copy of your medical information. If you have questions, please visit the Frequently Asked Questions section of the [employer] website. Remember, [employer] is NOT to be used for urgent needs. For medical emergencies, dial 911. Now available from your iPhone and Android! Please provide this summary of care documentation to your next provider. Your primary care clinician is listed as Arlene Madsen. If you have any questions after today's visit, please call [phone].

## 2017-06-23 ENCOUNTER — HOSPITAL ENCOUNTER (OUTPATIENT)
Dept: PHYSICAL THERAPY | Age: 62
Discharge: HOME | End: 2017-06-23
Payer: COMMERCIAL

## 2017-06-23 PROCEDURE — 97112 NEUROMUSCULAR REEDUCATION: CPT

## 2017-06-23 NOTE — PROGRESS NOTES
PT DAILY TREATMENT NOTE     Patient Name: Michael President  Date:2017  : 1955  [x]  Patient  Verified  Payor: BLUE CROSS / Plan: [address] [place] [address] / Product Type: PPO /    In time:902  Out time:951  Total Treatment Time (min): 49  Visit #: 7 of 8    Treatment Area: Chronic right SI joint pain [M53.3, G89.29]    SUBJECTIVE  Pain Level (0-10 scale): 0  Any medication changes, allergies to medications, adverse drug reactions, diagnosis change, or new procedure performed?: [x] No    [] Yes (see summary sheet for update)  Subjective functional status/changes:   [] No changes reported  I'm just still tight.     OBJECTIVE    Modality rationale: decrease pain to improve the patients ability to tolerate post needle soreness   Min Type Additional Details    [] Estim:  []Unatt       []IFC  []Premod                        []Other:  []w/ice   []w/heat  Position:  Location:    [] Estim: []Att    []TENS instruct  []NMES                    []Other:  []w/US   []w/ice   []w/heat  Position:  Location:    []  Traction: [] Cervical       []Lumbar                       [] Prone          []Supine                       []Intermittent   []Continuous Lbs:  [] before manual  [] after manual    []  Ultrasound: []Continuous   [] Pulsed                           []1MHz   []3MHz W/cm2:  Location:    []  Iontophoresis with dexamethasone         Location: [] Take home patch   [] In clinic   10 [x]  Ice     []  heat  []  Ice massage  []  Laser   []  Anodyne Position:prone  Location:L/s    []  Laser with stim  []  Other:  Position:  Location:    []  Vasopneumatic Device Pressure:       [] lo [] med [] hi   Temperature: [] lo [] med [] hi   [x] Skin assessment post-treatment:  []intact []redness- no adverse reaction      39 min Neuromuscular Re-education:  []  See flow sheet :   Rationale: increase ROM and increase strength  to improve the patients pelvic obliquity and improve stability  Dry Needling Procedure Note    Procedure: An intramuscular manual therapy (dry needling) and a neuro-muscular re-education treatment was done to deactivate myofascial trigger points with a 30 gauge filament needle under aseptic technique. Indications:  [x] Myofascial pain and dysfunction [] Muscled spasms  [] Myalgia/myositis   [] Muscle cramps  [x] Muscle imbalances  [] Other:    Chart reviewed for the following:  Shivani CAMPOS PT, have reviewed the medical history, summary list and precautions/contraindications for [place].   TIME OUT performed immediately prior to start of procedure:  Shivani CAMPOS PT, have performed the following reviews on [place] prior to the start of the session:      [x] Verified patient identification by name and date of birth    [x] Agreement on all muscles being treated was verified   [x] Purpose of dry needling, side effects, possible complications, risks and benefits were explained to the patient   [x] Procedure site(s) verified  [x] Patient was positioned for comfort and draped for privacy  [x] Informed Consent was signed (initial visit) and verified verbally (subsequent visits)  [x] Patient was instructed on the need to report the use of blood thinners and/or immunosuppressant medications  [x] How to respond to possible adverse effects of treatment  [x] Self treatment of post needling soreness: ice, heat (moist heat, heat wraps) and stretching  [x] Opportunity was given to ask any questions, all questions were answered            Time: 904  Date of procedure: 6/23/2017    Treatment: The following muscles were treated today with intramuscular dry needling  [] Left [] Right Abdominals: Ant Rectus Abdominis  [] Left [] Right External Oblique / Internal Oblique / Transverse Abdominis  [] Left [] Right Thoracic Multifidi / [place]  [] Left [] Right Iliocostalis Thoracis / Lisa Mow  [] Left [] Right Longissimus Thoracis / Lisa Mow  [] Left [] Right Lumbar Multifidi  [] Left [] Right Serratus Posterior Inferior  [] Left [] Right Quadratus Lumborum  [] Left [] Right Psoas  [] Left [] Right Iliacus  [] Left [] Right Iliopsoas (inguinal)  [x] Left [x] Right Piriformis  [] Left [] Right Quadratus Femoris  [x] Left [x] Right Surinder Daughters / Abdoulaye Ganong / Capo  [] Left [] Right Obturator Internus  [] Left [] Right Obturator Externus / Cyrilla Sara / Inferior Gemellus    [] Left [] Right Tensor Fasciae Mitali  [] Left [] Right Iliotibial Band  [] Left [] Right Pectineus  [] Left [] Right Sartorius  [] Left [] Right Gracilis  [] Left [] Right Adductor Brevis / Adductor Longus / Adductor Jas  [] Left [] Right Rectus Femoris / Vastus Lateralis / Vastus Intermedius / Vastus Medialis Obliquus  [] Left [] Right Tibialis Anterior / Posterior  [] Left [] Right Extensor Digitorum Longus / Brevis  [] Left [] Right Extensor Hallucis Longus / Brevis  [] Left [] Right Hamstrings: Biceps Femoris / Robert Hopper / Semimembranosis  [] Left [] Right Popliteus / Planteris  [] Left [] Right Gastrocnemius Medial / Lateral  [] Left [] Right Soleus  [] Left [] Right Peronei: Longus / Isabel Shack / Tertius  [] Left [] Right Flexor Digitorum Longus / Brevis  [] Left [] Right Flexor Hallucis Longus / Brevis  [] Left [] Right Quadratus Plantae  [] Left [] Right Abductor Digiti Minimi  [] Left [] Right Foot Interossei  [] Left [] Right Other:    Patient's response to today's treatment:  [x] Latent Twitch Response  [x] Muscle relaxation [x] Pain Relief  [x] Post needling soreness [x] without complications  [] Increased Range of Motion  [] Other:     Performed by: Rajeev Rutledge PT            With   [] TE   [] TA   [] neuro   [] other: Patient Education: [x] Review HEP    [] Progressed/Changed HEP based on:   [] positioning   [] body mechanics   [] transfers   [] heat/ice application    [] other:      Other Objective/Functional Measures: L ant and torsion; leg length measurement: 89 cm B     Pain Level (0-10 scale) post treatment: 6-7    ASSESSMENT/Changes in Function: Patient is progressing well, and would benefit from continuing with PT to further progress with strength and stability . Patient will continue to benefit from skilled PT services to modify and progress therapeutic interventions, address functional mobility deficits, address ROM deficits, address strength deficits, analyze and address soft tissue restrictions, analyze and cue movement patterns and assess and modify postural abnormalities to attain remaining goals. []  See Plan of Care  [x]  See progress note/recertification  []  See Discharge Summary         Progress towards goals / Updated goals:  Short Term Goals: To be accomplished in 2 weeks:  1. Pt will be compliant with HEP to increase soft tissue contractures to address core and pelvic stability and resume working out Met- Compliant with HEP - initiated program on left because she \"felt off\" and patient was correct 6/5/2017  2. Pt will present with pelvic symmetry for 2 visits to increase ease of ADLs - Not met. 6/7/2017; no change 6/14/17  Long Term Goals: To be accomplished in 4 weeks:  1. Pt will improve FOTO to 60 to increase ease of ADLs - FOTO 76%. 6/20/2017- goal met  2. Pt will be able to complete QP strengthening and supine abdominal work on the Oov without compensation to improve core and pelvic stability to resume working out. Progressing- less cueing 6/23/17  3. Pt will improve hip abduction and extension strength to 4/5 (B) to increase ease of ADLs.  Not met 6/23/17       PLAN  []  Upgrade activities as tolerated     [x]  Continue plan of care  []  Update interventions per flow sheet       []  Discharge due to:_  []  Other:_      Soraya Zarate PT 6/23/2017  10:32 AM    Future Appointments  Date Time Provider Paulette Tello   6/26/2017 8:00 AM [facility]   6/28/2017 4:00 PM [facility]   7/3/2017 8:00 AM [facility] 7/6/2017 10:30 AM Rajeev Rutledge PT [id number]   8/10/2017 9:55 AM Shekhar Eubanks  [address]

## 2017-06-23 NOTE — PROGRESS NOTES
[facility]  [address]  [place], [address]  [phone] fax    Physical Therapy Progress Note  Patient name: Laquita Pompa Start of Care: 17   Referral source: Sarah Wise MD : 1955   Medical/Treatment Diagnosis: Chronic right SI joint pain [M53.3, G89.29] Onset Date:6 months ago     Prior Hospitalization: see medical history Provider#: [id number]   Medications: Verified on Patient Summary List    Comorbidities: back pain, OA. Depression, HA, HTN, osteoporosis   Prior Level of Function: Recreational exercises power walking 5-7 miles per day and lifting weights  Visits from Start of Care: 7    Missed Visits: 0      Established Goals:        Excellent         Good         Limited            None  [] Increased ROM   []  []  []  []  [x] Increased Strength  []  [x]  []  []  [x] Increased Mobility  []  [x]  []  []   [x] Decreased Pain   []  [x]  []  []  [] Decreased Swelling  []  []  []  []    Key Functional Changes: FOTO 76    Updated Goals: to be achieved in 2 weeks: continue towards unmet goals   Short Term Goals: To be accomplished in 2 weeks:  1. Pt will be compliant with HEP to increase soft tissue contractures to address core and pelvic stability and resume working out Met- Compliant with HEP - initiated program on left because she \"felt off\" and patient was correct 2017  2. Pt will present with pelvic symmetry for 2 visits to increase ease of ADLs - Not met. 2017; no change 17  Long Term Goals: To be accomplished in 4 weeks:  1. Pt will improve FOTO to 60 to increase ease of ADLs - FOTO 76%. 2017- goal met  2. Pt will be able to complete QP strengthening and supine abdominal work on the Oov without compensation to improve core and pelvic stability to resume working out. Progressing- less cueing 17  3. Pt will improve hip abduction and extension strength to 4/5 (B) to increase ease of ADLs.  Not met 6/23/17   ASSESSMENT/RECOMMENDATIONS:  [x]Continue therapy per initial plan/protocol at a frequency of  2 x per week for 2 weeks  []Continue therapy with the following recommended changes:_____________________      _____________________________________________________________________  []Discontinue therapy progressing towards or have reached established goals  []Discontinue therapy due to lack of appreciable progress towards goals  []Discontinue therapy due to lack of attendance or compliance  []Await Physician's recommendations/decisions regarding therapy  []Other:________________________________________________________________    Thank you for this referral.    Isaias Payan, PT 6/23/2017 12:56 PM  NOTE TO PHYSICIAN:  PLEASE COMPLETE THE ORDERS BELOW AND   FAX TO [facility]: [phone]  If you are unable to process this request in 24 hours please contact our office: [phone]    ? I have read the above report and request that my patient continue as recommended. ? I have read the above report and request that my patient continue therapy with the following changes/special instructions:__________________________________________________________  ? I have read the above report and request that my patient be discharged from therapy.     Physicians signature: ______________________________Date: ______Time:______

## 2017-06-26 ENCOUNTER — HOSPITAL ENCOUNTER (OUTPATIENT)
Dept: PHYSICAL THERAPY | Age: 62
Discharge: HOME | End: 2017-06-26
Payer: COMMERCIAL

## 2017-06-26 PROCEDURE — 97110 THERAPEUTIC EXERCISES: CPT

## 2017-06-26 PROCEDURE — 97112 NEUROMUSCULAR REEDUCATION: CPT

## 2017-06-26 PROCEDURE — 97140 MANUAL THERAPY 1/> REGIONS: CPT

## 2017-06-26 NOTE — PROGRESS NOTES
PT DAILY TREATMENT NOTE     Patient Name: Karl Setting  Date:2017  : 1955  [x]  Patient  Verified  Payor: [employer] / Plan: [address] [place] [address] / Product Type: PPO /    In time:8:04  Out time:8:57  Total Treatment Time (min): 48  Visit #: 1 of 4    Treatment Area: Chronic right SI joint pain [M53.3, G89.29]    SUBJECTIVE  Pain Level (0-10 scale): 1/10  Any medication changes, allergies to medications, adverse drug reactions, diagnosis change, or new procedure performed?: [x] No    [] Yes (see summary sheet for update)  Subjective functional status/changes:   [] No changes reported  \"Doing pretty good, it's about a 1/10 today. \"    OBJECTIVE    8 min Therapeutic Exercise:  [x] See flow sheet :   Rationale: increase ROM and increase strength to improve the patients ability to perform ADL's. 37 min Neuromuscular Re-education:  [x]  See flow sheet :   Rationale: increase strength and increase proprioception  to improve the patients ability to perform functional activities. 8 min Manual Therapy:  LE distraction and shotgun technique to correct (R) SI upslip. Graston technique to (R) QL and L/S paraspinals. Rationale: decrease pain, increase ROM, increase tissue extensibility and decrease trigger points to improve activity tolerance. With   [x] TE   [] TA   [] neuro   [] other: Patient Education: [x] Review HEP    [] Progressed/Changed HEP based on:   [] positioning   [] body mechanics   [] transfers   [] heat/ice application    [] other:      Other Objective/Functional Measures: Added side-lying hip LE circles 10x each. Trial Erinn pt reported improved mobility after manual.    Pain Level (0-10 scale) post treatment: 1/10    ASSESSMENT/Changes in Function: Pt stated \"getting out of bed used to be incredibly painful but now it's better which is encouraging. \"    Patient will continue to benefit from skilled PT services to modify and progress therapeutic interventions, address functional mobility deficits, address ROM deficits, address strength deficits, analyze and address soft tissue restrictions and analyze and modify body mechanics/ergonomics to attain remaining goals. [x]  See Plan of Care  []  See progress note/recertification  []  See Discharge Summary         Progress towards goals / Updated goals:  Updated Goals: to be achieved in 2 weeks: continue towards unmet goals  Short Term Goals: To be accomplished in 2 weeks:  1. Pt will be compliant with HEP to increase soft tissue contractures to address core and pelvic stability and resume working out Met- Compliant with HEP - initiated program on left because she \"felt off\" and patient was correct 6/5/2017  2. Pt will present with pelvic symmetry for 2 visits to increase ease of ADLs - Not met. 6/7/2017; no change 6/14/17  Long Term Goals: To be accomplished in 4 weeks:  1. Pt will improve FOTO to 60 to increase ease of ADLs - FOTO 76%. 6/20/2017- goal met  2. Pt will be able to complete QP strengthening and supine abdominal work on the Oov without compensation to improve core and pelvic stability to resume working out. Progressing- less cueing 6/23/17  3. Pt will improve hip abduction and extension strength to 4/5 (B) to increase ease of ADLs.  Not met 6/23/17    PLAN  []  Upgrade activities as tolerated     [x]  Continue plan of care  []  Update interventions per flow sheet       []  Discharge due to:_  []  Other:_      Nikkie Osullivan PTA 6/26/2017  8:05 AM    Future Appointments  Date Time Provider Paulette Tello   6/28/2017 4:00 PM Sindy Mo PT [facility]   7/3/2017 8:00 AM Nikkie Osullivan PTA [facility]   7/6/2017 10:30 AM Sindy Mo PT [facility]   8/10/2017 9:55 AM Mimi Hopson  [address]

## 2017-06-28 ENCOUNTER — TELEPHONE (OUTPATIENT)
Dept: ORTHOPEDIC SURGERY | Age: 62
End: 2017-06-28

## 2017-06-28 ENCOUNTER — HOSPITAL ENCOUNTER (OUTPATIENT)
Dept: PHYSICAL THERAPY | Age: 62
Discharge: HOME | End: 2017-06-28
Payer: COMMERCIAL

## 2017-06-28 PROCEDURE — 97110 THERAPEUTIC EXERCISES: CPT

## 2017-06-28 PROCEDURE — 97112 NEUROMUSCULAR REEDUCATION: CPT

## 2017-06-28 NOTE — PROGRESS NOTES
PT DAILY TREATMENT NOTE     Patient Name: Ivory Frias  Date:2017  : 1955  [x]  Patient  Verified  Payor: [employer] / Plan: [address] [place] [address] / Product Type: PPO /    In time:400  Out time:458  Total Treatment Time (min): 62  Visit #: 2 of 4    Treatment Area: Chronic right SI joint pain [M53.3, G89.29]    SUBJECTIVE  Pain Level (0-10 scale): 1.5   Any medication changes, allergies to medications, adverse drug reactions, diagnosis change, or new procedure performed?: [x] No    [] Yes (see summary sheet for update)  Subjective functional status/changes:   [] No changes reported  The L side is bothering me today.     OBJECTIVE    Modality rationale: decrease pain to improve the patients ability to tolerate post needle soreness   Min Type Additional Details    [] Estim:  []Unatt       []IFC  []Premod                        []Other:  []w/ice   []w/heat  Position:  Location:    [] Estim: []Att    []TENS instruct  []NMES                    []Other:  []w/US   []w/ice   []w/heat  Position:  Location:    []  Traction: [] Cervical       []Lumbar                       [] Prone          []Supine                       []Intermittent   []Continuous Lbs:  [] before manual  [] after manual    []  Ultrasound: []Continuous   [] Pulsed                           []1MHz   []3MHz W/cm2:  Location:    []  Iontophoresis with dexamethasone         Location: [] Take home patch   [] In clinic   10 [x]  Ice     []  heat  []  Ice massage  []  Laser   []  Anodyne Position:supine  Location:B glutes    []  Laser with stim  []  Other:  Position:  Location:    []  Vasopneumatic Device Pressure:       [] lo [] med [] hi   Temperature: [] lo [] med [] hi   [x] Skin assessment post-treatment:  []intact []redness- no adverse reaction    []redness - adverse reaction:       8 min Therapeutic Exercise:  [] See flow sheet :   Rationale: increase ROM to improve the patients ability to increase ease with daily activities 40 min Neuromuscular Re-education:  [x]  See flow sheet :DN- see below   Rationale: increase ROM and increase strength  to improve the patients postural awareness and stability   Dry Needling Procedure Note    Procedure: An intramuscular manual therapy (dry needling) and a neuro-muscular re-education treatment was done to deactivate myofascial trigger points with a 30 gauge filament needle under aseptic technique. Indications:  [x] Myofascial pain and dysfunction [] Muscled spasms  [] Myalgia/myositis   [] Muscle cramps  [x] Muscle imbalances  [] Other:    Chart reviewed for the following:  Marnie CAMPOS, PT, have reviewed the medical history, summary list and precautions/contraindications for [employer].   TIME OUT performed immediately prior to start of procedure:  Marnie CAMPOS PT, have performed the following reviews on [employer] prior to the start of the session:      [x] Verified patient identification by name and date of birth    [x] Agreement on all muscles being treated was verified   [x] Purpose of dry needling, side effects, possible complications, risks and benefits were explained to the patient   [x] Procedure site(s) verified  [x] Patient was positioned for comfort and draped for privacy  [x] Informed Consent was signed (initial visit) and verified verbally (subsequent visits)  [x] Patient was instructed on the need to report the use of blood thinners and/or immunosuppressant medications  [x] How to respond to possible adverse effects of treatment  [x] Self treatment of post needling soreness: ice, heat (moist heat, heat wraps) and stretching  [x] Opportunity was given to ask any questions, all questions were answered            Time: 417  Date of procedure: 6/28/2017    Treatment: The following muscles were treated today with intramuscular dry needling  [] Left [] Right Abdominals: Ant Rectus Abdominis  [] Left [] Right External Oblique / Internal Oblique / Transverse Abdominis  [] Left [] Right Thoracic Multifidi / Rotatores  [] Left [] Right Iliocostalis Thoracis / Lumborum  [] Left [] Right Longissimus Thoracis / Lumborum  [] Left [] Right Lumbar Multifidi  [] Left [] Right Serratus Posterior Inferior  [] Left [] Right Quadratus Lumborum  [] Left [] Right Psoas  [] Left [] Right Iliacus  [] Left [] Right Iliopsoas (inguinal)  [x] Left [x] Right Piriformis  [] Left [x] Right Quadratus Femoris  [x] Left [x] Right Aramis Norlina / [place] / Capo  [] Left [] Right Obturator Internus  [] Left [] Right Obturator Externus / Larry Coppersmith / Inferior Gemellus    [] Left [] Right Tensor Fasciae Mitali  [] Left [] Right Iliotibial Band  [] Left [] Right Pectineus  [] Left [] Right Sartorius  [] Left [] Right Gracilis  [] Left [] Right Adductor Brevis / Adductor Longus / Adductor Jas  [] Left [] Right Rectus Femoris / Vastus Lateralis / Vastus Intermedius / Vastus Medialis Obliquus  [] Left [] Right Tibialis Anterior / Posterior  [] Left [] Right Extensor Digitorum Longus / Brevis  [] Left [] Right Extensor Hallucis Longus / Brevis  [] Left [] Right Hamstrings: Biceps Femoris / Awanda Boop / Semimembranosis  [] Left [] Right Popliteus / Planteris  [] Left [] Right Gastrocnemius Medial / Lateral  [] Left [] Right Soleus  [] Left [] Right Peronei: Longus / Pamelia Bad / Tertius  [] Left [] Right Flexor Digitorum Longus / Brevis  [] Left [] Right Flexor Hallucis Longus / Brevis  [] Left [] Right Quadratus Plantae  [] Left [] Right Abductor Digiti Minimi  [] Left [] Right Foot Interossei  [] Left [] Right Other:    Patient's response to today's treatment:  [x] Latent Twitch Response  [x] Muscle relaxation [x] Pain Relief  [x] Post needling soreness [x] without complications  [] Increased Range of Motion  [] Other:     Performed by: Emilee Ambrocoi, PT            With   [] TE   [] TA   [] neuro   [] other: Patient Education: [x] Review HEP    [] Progressed/Changed HEP based on:   [] positioning   [] body mechanics   [] transfers   [] heat/ice application    [] other:      Other Objective/Functional Measures: L torsion     Pain Level (0-10 scale) post treatment: 6 \" from needling\"    ASSESSMENT/Changes in Function: challenged with QP oov exercises, especially combo of L UE/R LE. Patient will continue to benefit from skilled PT services to modify and progress therapeutic interventions, address functional mobility deficits, address ROM deficits, address strength deficits, analyze and address soft tissue restrictions, analyze and cue movement patterns and assess and modify postural abnormalities to attain remaining goals. []  See Plan of Care  []  See progress note/recertification  []  See Discharge Summary         Progress towards goals / Updated goals:  Short Term Goals: To be accomplished in 2 weeks:  1. Pt will be compliant with HEP to increase soft tissue contractures to address core and pelvic stability and resume working out Met- Compliant with HEP - initiated program on left because she \"felt off\" and patient was correct 6/5/2017  2. Pt will present with pelvic symmetry for 2 visits to increase ease of ADLs - Not met. 6/7/2017; no change 6/14/17  Long Term Goals: To be accomplished in 4 weeks:  1. Pt will improve FOTO to 60 to increase ease of ADLs - FOTO 76%. 6/20/2017- goal met  2. Pt will be able to complete QP strengthening and supine abdominal work on the Oov without compensation to improve core and pelvic stability to resume working out. Progressing- less cueing 6/23/17; no change 6/28/17  3. Pt will improve hip abduction and extension strength to 4/5 (B) to increase ease of ADLs.  Not met 6/23/17    PLAN  []  Upgrade activities as tolerated     []  Continue plan of care  []  Update interventions per flow sheet       []  Discharge due to:_  []  Other:_      Kervin Shields, PT 6/28/2017  5:02 PM    Future Appointments  Date Time Provider Paulette Tello   7/3/2017 2:00 PM Leopoldo Filter, PTA [facility]   7/6/2017 10:30 AM Parviz Syed, PT [facility]   8/10/2017 9:55 AM Jorge Roberts  [address]

## 2017-06-28 NOTE — TELEPHONE ENCOUNTER
Pt called in states that her pharmacy says they are waiting on Dr. Maximino Sandoval to give prior auth for her Lidocaine RX. Pt can be reached at [phone]. Pt also would like to know for future if she could do refills through [employer]?

## 2017-06-29 NOTE — TELEPHONE ENCOUNTER
Called and spoke to Aure Huff at [employer] and did a verbal prior authorization and the Lidoderm patches 5% has been approved and the case id number is [id number] and it is good from 5/30/17 until 6/30/20. Called and informed patient that we have gotten the approval for her Lidoderm patches and she should be able to go to the pharmacy and pick them up. Patient verbalized understanding.

## 2017-07-03 ENCOUNTER — HOSPITAL ENCOUNTER (OUTPATIENT)
Dept: PHYSICAL THERAPY | Age: 62
Discharge: HOME | End: 2017-07-03
Payer: COMMERCIAL

## 2017-07-03 PROCEDURE — 97140 MANUAL THERAPY 1/> REGIONS: CPT

## 2017-07-03 PROCEDURE — 97112 NEUROMUSCULAR REEDUCATION: CPT

## 2017-07-03 NOTE — PROGRESS NOTES
PT DAILY TREATMENT NOTE     Patient Name: Florina Marie  Date:7/3/2017  : 1955  [x]  Patient  Verified  Payor: Estevan Bustamante / Plan: [address] [place] [address] / Product Type: PPO /    In time:2:03  Out time:2:51  Total Treatment Time (min): 48  Visit #: 3 of 4    Treatment Area: Chronic right SI joint pain [M53.3, G89.29]    SUBJECTIVE  Pain Level (0-10 scale): 3/10  Any medication changes, allergies to medications, adverse drug reactions, diagnosis change, or new procedure performed?: [x] No    [] Yes (see summary sheet for update)  Subjective functional status/changes:   [] No changes reported  \"It was pretty bad all weekend but I'm functional, doesn't keep me from doing anything. \"    OBJECTIVE     38 min Neuromuscular Re-education:  [x]  See flow sheet :   Rationale: increase ROM, increase strength and increase proprioception  to improve the patients ability to perform functional activities. 10 min Manual Therapy:  LE distraction and MET to correct (R) SI upslip and posterior rotation. Graston technique to (B) L/S paraspinals and QL. Rationale: decrease pain, increase ROM, increase tissue extensibility and decrease trigger points to improve activity tolerance. With   [x] TE   [] TA   [] neuro   [] other: Patient Education: [x] Review HEP    [] Progressed/Changed HEP based on:   [] positioning   [] body mechanics   [] transfers   [] heat/ice application    [] other:      Other Objective/Functional Measures: Cueing to correct rib flaring with TA contraction. Pain Level (0-10 scale) post treatment: 0/10    ASSESSMENT/Changes in Function: Pt reported a decrease in tension and denied pain after treatment.     Patient will continue to benefit from skilled PT services to modify and progress therapeutic interventions, address functional mobility deficits, address strength deficits, analyze and address soft tissue restrictions, analyze and cue movement patterns and analyze and modify body mechanics/ergonomics to attain remaining goals. [x]  See Plan of Care  []  See progress note/recertification  []  See Discharge Summary         Progress towards goals / Updated goals:  Short Term Goals: To be accomplished in 2 weeks:  1. Pt will be compliant with HEP to increase soft tissue contractures to address core and pelvic stability and resume working out Met- Compliant with HEP - initiated program on left because she \"felt off\" and patient was correct 6/5/2017  2. Pt will present with pelvic symmetry for 2 visits to increase ease of ADLs - Not met. 6/7/2017; no change 6/14/17  Long Term Goals: To be accomplished in 4 weeks:  1. Pt will improve FOTO to 60 to increase ease of ADLs - FOTO 76%. 6/20/2017- goal met  2. Pt will be able to complete QP strengthening and supine abdominal work on the Oov without compensation to improve core and pelvic stability to resume working out. Progressing- less cueing 6/23/17; no change 6/28/17  3. Pt will improve hip abduction and extension strength to 4/5 (B) to increase ease of ADLs.  Not met 6/23/17    PLAN  []  Upgrade activities as tolerated     [x]  Continue plan of care  []  Update interventions per flow sheet       []  Discharge due to:_  []  Other:_      Cristi Ayala PTA 7/3/2017  2:11 PM    Future Appointments  Date Time Provider Paulette Tello   7/6/2017 10:30 AM Neptali Santiago PT [facility]   8/10/2017 9:55 AM Iris Huerta  [address]

## 2017-07-06 ENCOUNTER — HOSPITAL ENCOUNTER (OUTPATIENT)
Dept: PHYSICAL THERAPY | Age: 62
Discharge: HOME | End: 2017-07-06
Payer: COMMERCIAL

## 2017-07-06 PROCEDURE — 97535 SELF CARE MNGMENT TRAINING: CPT

## 2017-07-06 PROCEDURE — 97112 NEUROMUSCULAR REEDUCATION: CPT

## 2017-07-06 NOTE — PROGRESS NOTES
PT DISCHARGE DAILY NOTE AND [id number]    Date:2017  Patient name: Ata Valadez Start of Care: 17   Referral source: Markus Desouza MD : [id number]   Medical/Treatment Diagnosis: Chronic right SI joint pain [M53.3, G89.29] Onset Date:6 months ago     Prior Hospitalization: see medical history Provider#: [id number]   Medications: Verified on Patient Summary List    Comorbidities: back pain, OA. Depression, HA, HTN, osteoporosis   Prior Level of Function: Recreational exercises power walking 5-7 miles per day and lifting weights  Visits from Start of Care: 11    Missed Visits: 0    Reporting Period : 17 to 17    [x]  Patient  Verified  Payor: Joanne Smith / Plan: [address] [place] [address] / Product Type: PPO /    In time:1030  Out time:1107  Total Treatment Time (min): 37  Visit #: 4 of 4    SUBJECTIVE  Pain Level (0-10 scale): 0  Any medication changes, allergies to medications, adverse drug reactions, diagnosis change, or new procedure performed?: [x] No    [] Yes (see summary sheet for update)  Subjective functional status/changes:   [] No changes reported  I'm feeling a little bit tight on the R side. Report she received her oov for home use.      OBJECTIVE    Modality rationale: decrease pain to improve the patients ability to tolerate post needle soreness   Min Type Additional Details    [] Estim:  []Unatt       []IFC  []Premod                        []Other:  []w/ice   []w/heat  Position:  Location:    [] Estim: []Att    []TENS instruct  []NMES                    []Other:  []w/US   []w/ice   []w/heat  Position:  Location:    []  Traction: [] Cervical       []Lumbar                       [] Prone          []Supine                       []Intermittent   []Continuous Lbs:  [] before manual  [] after manual    []  Ultrasound: []Continuous   [] Pulsed                           []1MHz   []3MHz W/cm2:  Location:    []  Iontophoresis with dexamethasone         Location: [] Take home patch [] In clinic   5 [x]  Ice     []  heat  []  Ice massage  []  Laser   []  Anodyne Position:supine  Location:R glute    []  Laser with stim  []  Other:  Position:  Location:    []  Vasopneumatic Device Pressure:       [] lo [] med [] hi   Temperature: [] lo [] med [] hi   [x] Skin assessment post-treatment:  []intact []redness- no adverse reaction    24 min Neuromuscular Re-education:  []  See flow sheet :   Rationale: increase strength, improve coordination, improve balance and increase proprioception  to improve the patients ability to increase ease with daily activities  Self-care: 8'  Dry Needling Procedure Note    Procedure: An intramuscular manual therapy (dry needling) and a neuro-muscular re-education treatment was done to deactivate myofascial trigger points with a 30 gauge filament needle under aseptic technique. Indications:  [x] Myofascial pain and dysfunction [] Muscled spasms  [] Myalgia/myositis   [] Muscle cramps  [x] Muscle imbalances  [] Other:    Chart reviewed for the following:  Funmilayo CAMPOS PT, have reviewed the medical history, summary list and precautions/contraindications for [facility].   TIME OUT performed immediately prior to start of procedure:  Funmilayo CAMPOS PT, have performed the following reviews on [facility] prior to the start of the session:      [x] Verified patient identification by name and date of birth    [x] Agreement on all muscles being treated was verified   [x] Purpose of dry needling, side effects, possible complications, risks and benefits were explained to the patient   [x] Procedure site(s) verified  [x] Patient was positioned for comfort and draped for privacy  [x] Informed Consent was signed (initial visit) and verified verbally (subsequent visits)  [x] Patient was instructed on the need to report the use of blood thinners and/or immunosuppressant medications  [x] How to respond to possible adverse effects of treatment  [x] Self treatment of post needling soreness: ice, heat (moist heat, heat wraps) and stretching  [x] Opportunity was given to ask any questions, all questions were answered            Time: 1032  Date of procedure: 7/6/2017    Treatment: The following muscles were treated today with intramuscular dry needling  [] Left [] Right Abdominals: Ant Rectus Abdominis  [] Left [] Right External Oblique / Internal Oblique / Transverse Abdominis  [] Left [] Right Thoracic Multifidi / Vianne Asper  [] Left [] Right Iliocostalis Verlan Gaytan / Vivica Nam  [] Left [] Right Longissimus Thoracis / Vivica Nam  [] Left [] Right Lumbar Multifidi  [] Left [] Right Serratus Posterior Inferior  [] Left [] Right Quadratus Lumborum  [] Left [] Right Psoas  [] Left [] Right Iliacus  [] Left [] Right Iliopsoas (inguinal)  [] Left [x] Right Piriformis  [] Left [x] Right Quadratus Femoris  [] Left [x] Right Jessica Beverly / Oni Chiang / Ashlyn Barcenas  [] Left [] Right Obturator Internus  [] Left [] Right Obturator Externus / Kayleigh Baar / Inferior Gemellus    [] Left [] Right Tensor Fasciae Mitali  [] Left [] Right Iliotibial Band  [] Left [] Right Pectineus  [] Left [] Right Sartorius  [] Left [] Right Gracilis  [] Left [] Right Adductor Brevis / Adductor Longus / Adductor Jas  [] Left [] Right Rectus Femoris / Vastus Lateralis / Vastus Intermedius / Vastus Medialis Obliquus  [] Left [] Right Tibialis Anterior / Posterior  [] Left [] Right Extensor Digitorum Longus / Brevis  [] Left [] Right Extensor Hallucis Longus / Brevis  [] Left [] Right Hamstrings: Biceps Femoris / Smiley Arash / Semimembranosis  [] Left [] Right Popliteus / Planteris  [] Left [] Right Gastrocnemius Medial / Lateral  [] Left [] Right Soleus  [] Left [] Right Peronei: Longus / Brevis / Tertius  [] Left [] Right Flexor Digitorum Longus / Brevis  [] Left [] Right Flexor Hallucis Longus / Brevis  [] Left [] Right Quadratus Plantae  [] Left [] Right Abductor Digiti Minimi  [] Left [] Right Foot Interossei  [] Left [] Right Other:    Patient's response to today's treatment:  [x] Latent Twitch Response  [x] Muscle relaxation [x] Pain Relief  [x] Post needling soreness [x] without complications  [] Increased Range of Motion  [] Other:     Performed by: Emilee Ambrocio, PT            With   [] TE   [] TA   [] neuro   [] other: Patient Education: [x] Review HEP    [] Progressed/Changed HEP based on:   [] positioning   [] body mechanics   [] transfers   [] heat/ice application    [] other:      Other Objective/Functional Measures:      Pain Level (0-10 scale) post treatment: 0    Summary of Care:  Short Term Goals: To be accomplished in 2 weeks:  1. Pt will be compliant with HEP to increase soft tissue contractures to address core and pelvic stability and resume working out Met- Compliant with HEP - initiated program on left because she \"felt off\" and patient was correct 6/5/2017  2. Pt will present with pelvic symmetry for 2 visits to increase ease of ADLs - Not met. 6/7/2017; no change 6/14/17  Long Term Goals: To be accomplished in 4 weeks:  1. Pt will improve FOTO to 60 to increase ease of ADLs - FOTO 76%. 6/20/2017- goal met  2. Pt will be able to complete QP strengthening and supine abdominal work on the Oov without compensation to improve core and pelvic stability to resume working out. Progressing- less cueing 6/23/17; no change 6/28/17  3. Pt will improve hip abduction and extension strength to 4/5 (B) to increase ease of ADLs. Not met 6/23/17; goal met 7/6/17  ASSESSMENT/Changes in Function: Patient has essentially met all goals. She has purchased an oov for home use and will continue with HEP for maintenance.      Thank you for this referral!      PLAN  [x]Discontinue therapy: [x]Patient has reached or is progressing toward set goals      []Patient is non-compliant or has abdicated      []Due to lack of appreciable progress towards set Ul. Adalberto Claros, PT 7/6/2017  11:33 AM

## 2017-08-10 ENCOUNTER — OFFICE VISIT (OUTPATIENT)
Dept: ORTHOPEDIC SURGERY | Age: 62
End: 2017-08-10

## 2017-08-10 VITALS
BODY MASS INDEX: 23.92 KG/M2 | RESPIRATION RATE: 16 BRPM | SYSTOLIC BLOOD PRESSURE: 135 MMHG | WEIGHT: 130 LBS | DIASTOLIC BLOOD PRESSURE: 65 MMHG | HEIGHT: 62 IN | HEART RATE: 73 BPM

## 2017-08-10 DIAGNOSIS — M46.1 SACROILIITIS (HCC): Primary | ICD-10-CM

## 2017-08-10 RX ORDER — MIRABEGRON 25 MG/1
TABLET, FILM COATED, EXTENDED RELEASE ORAL
Refills: 0 | COMMUNITY
Start: 2017-08-02

## 2017-08-10 RX ORDER — LIDOCAINE 50 MG/G
PATCH TOPICAL
Qty: 30 EACH | Refills: 1 | Status: SHIPPED | OUTPATIENT
Start: 2017-08-10 | End: 2018-02-27 | Stop reason: SDUPTHER

## 2017-08-10 NOTE — PATIENT INSTRUCTIONS
[facility] Activation    Thank you for requesting access to [facility]. Please follow the instructions below to securely access and download your online medical record. [facility] allows you to send messages to your doctor, view your test results, renew your prescriptions, schedule appointments, and more. How Do I Sign Up? 1. In your internet browser, go to [email or web address]  2. Click on the First Time User? Click Here link in the Sign In box. You will be redirect to the New Member Sign Up page. 3. Enter your [facility] Access Code exactly as it appears below. You will not need to use this code after youve completed the sign-up process. If you do not sign up before the expiration date, you must request a new code. [facility] Access Code: [email or web address]  Expires: 2017  9:50 AM (This is the date your [facility] access code will )    4. Enter the last four digits of your Social Security Number (xxxx) and Date of Birth (mm/dd/yyyy) as indicated and click Submit. You will be taken to the next sign-up page. 5. Create a [facility] ID. This will be your [facility] login ID and cannot be changed, so think of one that is secure and easy to remember. 6. Create a [facility] password. You can change your password at any time. 7. Enter your Password Reset Question and Answer. This can be used at a later time if you forget your password. 8. Enter your e-mail address. You will receive e-mail notification when new information is available in [address]. 9. Click Sign Up. You can now view and download portions of your medical record. 10. Click the Download Summary menu link to download a portable copy of your medical information. Additional Information    If you have questions, please visit the Frequently Asked Questions section of the [facility] website at [email or web address] "[employer]. [email or web address] Remember, [facility] is NOT to be used for urgent needs. For medical emergencies, dial 911.

## 2017-08-10 NOTE — PROGRESS NOTES
[facility]  [address]  [address], [address] [place]   Phone: [phone]  Fax: [phone]        PROGRESS NOTE      HISTORY OF PRESENT ILLNESS:  The patient is a 65 y.o. female and was seen today for follow up of recurrence of right buttock pain extending in an S1 distribution into the knee. She denies symptoms in the LLE at this time. She described symptoms consistent for stenosis. Pt states she has been working again. Her pain is not positional. Examination continues to be consistent with sacroiliitis on the right. She denies hx of lumbar spinal surgery. Pt received lumbar blocks back in 2012 with good relief. Pt underwent right SI joint injection on 4/20/17 with temporary relief x 10 days. She has attended physical therapy and a chiropractor in the past with benefit. Pt reports she has been completing her HEP with benefit. She is taking Percocet prn for pain relief. Pt states she d/c'd Fosamax due to GI upset. She is currently taking Topamax 100 mg BID. 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 Noted, patient is followed by an endocrinologist in [place]. Pt denies h/o DM. Preliminary reading of lumbosacral plain films revealed mild disc space narrowing L5-S1. Anterior osteophytes L5. No acute pathology identified. Lumbar spine MRI dated 1/30/17 reviewed. Per report, there was multilevel degenerative changes with mild areas of canal and foraminal encroachment. Left lateral disc osteophyte complexes noted mildly contacting and displacing the exiting left L5 nerve root. Small right paracentral disc protrusion at L1-2. Pelvic MRI dated 1/30/17 was reviewed. Per report, no acute findings. Mild bilateral SI joint osteoarthritic changes with evidence of prior healed insufficiency fracture along the upper anterior aspect of the right sacral ala. Lower lumbar spondylosis at L4-L5 and L5-S1.  At her last clinical appointment, she continued to demonstrate SI joint dysfunction. Patient was not interested in additional blocks or surgical intervention. I advised her to complete physical therapy as prescribed and continue with her HEP daily. I gave her a Rx for Lidoderm patches. The patient returns today with pain location and distribution remain unchanged. She rates pain 0-1/10, an improvement since her last visit (1-5/10). Pt is accompanied by her  today. She reports overall improvement in symptoms with physical therapy. Pt has been using the Lidoderm patches prn with benefit. Bone density test has been scheduled in 9/2017,  reviewed. Past Medical History:   Diagnosis Date    Arthritis     Back pain     Bursitis of right shoulder     subacromial    Degenerative arthritis of right knee     Depression     [facility]) 7/2015    (L) ankle fx    GERD (gastroesophageal reflux disease)     Herniated disc     Herpes simplex     genital    Hypertension     IBS (irritable bowel syndrome)     Lower extremity pain, bilateral     Migraine headache     Pemphigus vulgaris     Right knee pain     Right shoulder pain     Rosacea     Thyroid disease     Venous (peripheral) insufficiency     Wears glasses         Social History     Social History    Marital status:      Spouse name: N/A    Number of children: N/A    Years of education: N/A     Occupational History    Not on file.      Social History Main Topics    Smoking status: Never Smoker    Smokeless tobacco: Never Used    Alcohol use 0.0 oz/week     0 Standard drinks or equivalent per week      Comment: One glass of wine per month    Drug use: No    Sexual activity: Not on file     Other Topics Concern    Not on file     Social History Narrative       Current Outpatient Prescriptions   Medication Sig Dispense Refill    MYRBETRIQ 25 mg ER tablet take 1 tablet by mouth once daily if needed  0    lidocaine (LIDODERM) 5 % Apply patch to the affected area for 12 hours a day and remove for 12 hours a day. 30 Each 1    polyethylene glycol (MIRALAX) 17 gram/dose powder   1    doxycycline (VIBRA-TABS) 100 mg tablet       oxyCODONE-acetaminophen (PERCOCET) 5-325 mg per tablet Take 1 Tab by mouth daily. Max Daily Amount: 1 Tab. 30 Tab 0    alendronate (FOSAMAX) 70 mg tablet Take 70 mg by mouth every seven (7) days.  oxyCODONE-acetaminophen (PERCOCET) 5-325 mg per tablet Take 1 po q daily - bid prn pain 40 Tab 0    amLODIPine (NORVASC) 10 mg tablet Take 1 Tab by mouth daily. 90 Tab 3    valACYclovir (VALTREX) 1 gram tablet Take 1 Tab by mouth daily. 90 Tab 3    levothyroxine (SYNTHROID) 150 mcg tablet 1 qd 90 Tab 3    predniSONE (DELTASONE) 5 mg tablet Take 5 mg by mouth every other day.  MYCOPHENOLATE MOFETIL (CELLCEPT PO) Take 1 g by mouth two (2) times a day.  buPROPion XL (WELLBUTRIN XL) 300 mg XL tablet Take 300 mg by mouth every morning.  topiramate (TOPAMAX) 100 mg tablet Take  by mouth daily.  RIZATRIPTAN BENZOATE (MAXALT PO) Take  by mouth daily as needed.  doxycycline monohydrate (ORACEA) 40 mg capsule Take 40 mg by mouth every morning.  zolpidem CR (AMBIEN CR) 12.5 mg tablet Take 12.5 mg by mouth nightly as needed for Sleep.  DIAZEPAM (VALIUM PO) Take 5 mg by mouth three (3) times daily as needed.  cycloSPORINE (RESTASIS) 0.05 % ophthalmic emulsion Administer 1 Drop to both eyes two (2) times a day. Allergies   Allergen Reactions    Sulfa (Sulfonamide Antibiotics) Hives          PHYSICAL EXAMINATION    Visit Vitals    /65    Pulse 73    Resp 16    Ht 5' 2\" (1.575 m)    Wt 130 lb (59 kg)    BMI 23.78 kg/m2       CONSTITUTIONAL: NAD, A&O x 3  SENSATION: Intact to light touch throughout  RANGE OF MOTION: The patient has full passive range of motion in all four extremities.   MOTOR:  Straight Leg Raise: Negative, bilateral               Hip Flex Knee Ext Knee Flex Ankle DF GTE Ankle PF Tone   Right +4/5 +4/5 +4/5 +4/5 +4/5 +4/5 +4/5   Left +4/5 +4/5 +4/5 +4/5 +4/5 +4/5 +4/5       ASSESSMENT   Diagnoses and all orders for this visit:    1. Sacroiliitis (HCC)  -     lidocaine (LIDODERM) 5 %; Apply patch to the affected area for 12 hours a day and remove for 12 hours a day. IMPRESSION AND PLAN:  Patient reports overall improvement in symptoms with physical therapy. She was given refills of her Lidoderm patches. I encourage patient to perform her HEP daily. I will see the patient back in 3 month's time or earlier if needed. Written by Marika Patel, as dictated by Bimal Almonte MD  I examined the patient, reviewed and agree with the note.

## 2017-08-16 RX ORDER — AMLODIPINE BESYLATE 10 MG/1
10 TABLET ORAL DAILY
Qty: 90 TAB | Refills: 1 | Status: SHIPPED | OUTPATIENT
Start: 2017-08-16 | End: 2018-02-20 | Stop reason: SDUPTHER

## 2017-08-16 RX ORDER — LEVOTHYROXINE SODIUM 150 UG/1
TABLET ORAL
Qty: 90 TAB | Refills: 3 | Status: SHIPPED | OUTPATIENT
Start: 2017-08-16 | End: 2018-02-20 | Stop reason: SDUPTHER

## 2017-08-23 ENCOUNTER — HOSPITAL ENCOUNTER (OUTPATIENT)
Dept: LAB | Age: 62
Discharge: HOME | End: 2017-08-23
Payer: COMMERCIAL

## 2017-08-23 ENCOUNTER — OFFICE VISIT (OUTPATIENT)
Dept: INTERNAL MEDICINE CLINIC | Age: 62
End: 2017-08-23

## 2017-08-23 VITALS
WEIGHT: 131 LBS | SYSTOLIC BLOOD PRESSURE: 110 MMHG | RESPIRATION RATE: 16 BRPM | TEMPERATURE: 98.5 F | HEART RATE: 54 BPM | DIASTOLIC BLOOD PRESSURE: 78 MMHG | BODY MASS INDEX: 24.11 KG/M2 | HEIGHT: 62 IN | OXYGEN SATURATION: 99 %

## 2017-08-23 DIAGNOSIS — I10 ESSENTIAL HYPERTENSION, BENIGN: ICD-10-CM

## 2017-08-23 DIAGNOSIS — Z23 ENCOUNTER FOR IMMUNIZATION: ICD-10-CM

## 2017-08-23 DIAGNOSIS — E03.9 ACQUIRED HYPOTHYROIDISM: ICD-10-CM

## 2017-08-23 DIAGNOSIS — Z00.00 ROUTINE GENERAL MEDICAL EXAMINATION AT A HEALTH CARE FACILITY: Primary | ICD-10-CM

## 2017-08-23 LAB
ALBUMIN SERPL-MCNC: 4.2 G/DL (ref 3.4–5)
ALBUMIN/GLOB SERPL: 1.7 {RATIO} (ref 0.8–1.7)
ALP SERPL-CCNC: 47 U/L (ref 45–117)
ALT SERPL-CCNC: 16 U/L (ref 13–56)
ANION GAP SERPL CALC-SCNC: 7 MMOL/L (ref 3–18)
AST SERPL-CCNC: 16 U/L (ref 15–37)
BASOPHILS # BLD: 0.1 K/UL (ref 0–0.06)
BASOPHILS NFR BLD: 1 % (ref 0–2)
BILIRUB SERPL-MCNC: 0.5 MG/DL (ref 0.2–1)
BUN SERPL-MCNC: 22 MG/DL (ref 7–18)
BUN/CREAT SERPL: 20 (ref 12–20)
CALCIUM SERPL-MCNC: 9.3 MG/DL (ref 8.5–10.1)
CHLORIDE SERPL-SCNC: 107 MMOL/L (ref 100–108)
CHOLEST SERPL-MCNC: 210 MG/DL
CO2 SERPL-SCNC: 26 MMOL/L (ref 21–32)
CREAT SERPL-MCNC: 1.11 MG/DL (ref 0.6–1.3)
DIFFERENTIAL METHOD BLD: ABNORMAL
EOSINOPHIL # BLD: 0.3 K/UL (ref 0–0.4)
EOSINOPHIL NFR BLD: 6 % (ref 0–5)
ERYTHROCYTE [DISTWIDTH] IN BLOOD BY AUTOMATED COUNT: 14.1 % (ref 11.6–14.5)
GLOBULIN SER CALC-MCNC: 2.5 G/DL (ref 2–4)
GLUCOSE SERPL-MCNC: 86 MG/DL (ref 74–99)
HCT VFR BLD AUTO: 45.6 % (ref 35–45)
HDLC SERPL-MCNC: 110 MG/DL (ref 40–60)
HDLC SERPL: 1.9 {RATIO} (ref 0–5)
HGB BLD-MCNC: 13.6 G/DL (ref 12–16)
LDLC SERPL CALC-MCNC: 85.6 MG/DL (ref 0–100)
LIPID PROFILE,FLP: ABNORMAL
LYMPHOCYTES # BLD: 1.7 K/UL (ref 0.9–3.6)
LYMPHOCYTES NFR BLD: 31 % (ref 21–52)
MCH RBC QN AUTO: 32.9 PG (ref 24–34)
MCHC RBC AUTO-ENTMCNC: 29.8 G/DL (ref 31–37)
MCV RBC AUTO: 110.1 FL (ref 74–97)
MONOCYTES # BLD: 0.5 K/UL (ref 0.05–1.2)
MONOCYTES NFR BLD: 9 % (ref 3–10)
NEUTS SEG # BLD: 3 K/UL (ref 1.8–8)
NEUTS SEG NFR BLD: 53 % (ref 40–73)
PLATELET # BLD AUTO: 217 K/UL (ref 135–420)
PMV BLD AUTO: 12.7 FL (ref 9.2–11.8)
POTASSIUM SERPL-SCNC: 4.1 MMOL/L (ref 3.5–5.5)
PROT SERPL-MCNC: 6.7 G/DL (ref 6.4–8.2)
RBC # BLD AUTO: 4.14 M/UL (ref 4.2–5.3)
SODIUM SERPL-SCNC: 140 MMOL/L (ref 136–145)
TRIGL SERPL-MCNC: 72 MG/DL (ref ?–150)
TSH SERPL DL<=0.05 MIU/L-ACNC: 1.82 UIU/ML (ref 0.36–3.74)
VLDLC SERPL CALC-MCNC: 14.4 MG/DL
WBC # BLD AUTO: 5.6 K/UL (ref 4.6–13.2)

## 2017-08-23 PROCEDURE — 84443 ASSAY THYROID STIM HORMONE: CPT | Performed by: INTERNAL MEDICINE

## 2017-08-23 PROCEDURE — 80061 LIPID PANEL: CPT | Performed by: INTERNAL MEDICINE

## 2017-08-23 PROCEDURE — 80053 COMPREHEN METABOLIC PANEL: CPT | Performed by: INTERNAL MEDICINE

## 2017-08-23 PROCEDURE — 36415 COLL VENOUS BLD VENIPUNCTURE: CPT | Performed by: INTERNAL MEDICINE

## 2017-08-23 PROCEDURE — 85025 COMPLETE CBC W/AUTO DIFF WBC: CPT | Performed by: INTERNAL MEDICINE

## 2017-08-23 NOTE — PATIENT INSTRUCTIONS

## 2017-08-23 NOTE — PROGRESS NOTES
HPI:   Routine check up  Hx notable for HTN, hypothyroidism;   w/o chest pain/abd. discomfort; no dyspnea, cough or pedal edema; denies constitutional complaints of fever, night sweats or wt loss; no evidence of GI/ hemorrhage; no polyuria/polydipsia. Activity is age appropriate despite ongoing back pain    ROS is otherwise negative. Past Medical History:   Diagnosis Date    Arthritis     Back pain     Bursitis of right shoulder     subacromial    Degenerative arthritis of right knee     Depression     DVT of leg (deep venous thrombosis) ([facility]) 7/2015    (L) ankle fx    GERD (gastroesophageal reflux disease)     Herniated disc     Herpes simplex     genital    Hypertension     IBS (irritable bowel syndrome)     Lower extremity pain, bilateral     Migraine headache     Pemphigus vulgaris     Right knee pain     Right shoulder pain     Rosacea     Thyroid disease     Venous (peripheral) insufficiency     Wears glasses        Past Surgical History:   Procedure Laterality Date    HX APPENDECTOMY      HX COLONOSCOPY  9/2011; 12/23/16; 3/21/17    neg    HX MENISCUS REPAIR      HX SEPTOPLASTY      HX VEIN STRIPPING         Social History     Social History    Marital status:      Spouse name: N/A    Number of children: N/A    Years of education: N/A     Occupational History    Not on file.      Social History Main Topics    Smoking status: Never Smoker    Smokeless tobacco: Never Used    Alcohol use 0.0 oz/week     0 Standard drinks or equivalent per week      Comment: One glass of wine per month    Drug use: No    Sexual activity: Not on file     Other Topics Concern    Not on file     Social History Narrative       Allergies   Allergen Reactions    Sulfa (Sulfonamide Antibiotics) Hives       Family History   Problem Relation Age of Onset    Cancer Mother     Heart Disease Father     Hypertension Father     Cancer Maternal Aunt      breast cancer       Current Outpatient Prescriptions   Medication Sig Dispense Refill    amLODIPine (NORVASC) 10 mg tablet Take 1 Tab by mouth daily. 90 Tab 1    levothyroxine (SYNTHROID) 150 mcg tablet 1 qd 90 Tab 3    MYRBETRIQ 25 mg ER tablet take 1 tablet by mouth once daily if needed  0    lidocaine (LIDODERM) 5 % Apply patch to the affected area for 12 hours a day and remove for 12 hours a day. 30 Each 1    polyethylene glycol (MIRALAX) 17 gram/dose powder   1    doxycycline (VIBRA-TABS) 100 mg tablet 100 mg two (2) times a day.  oxyCODONE-acetaminophen (PERCOCET) 5-325 mg per tablet Take 1 Tab by mouth daily. Max Daily Amount: 1 Tab. 30 Tab 0    alendronate (FOSAMAX) 70 mg tablet Take 70 mg by mouth every seven (7) days.  valACYclovir (VALTREX) 1 gram tablet Take 1 Tab by mouth daily. 90 Tab 3    predniSONE (DELTASONE) 5 mg tablet Take 5 mg by mouth every other day.  MYCOPHENOLATE MOFETIL (CELLCEPT PO) Take 1 g by mouth two (2) times a day.  buPROPion XL (WELLBUTRIN XL) 300 mg XL tablet Take 300 mg by mouth every morning.  topiramate (TOPAMAX) 100 mg tablet Take  by mouth daily.  RIZATRIPTAN BENZOATE (MAXALT PO) Take  by mouth daily as needed.  zolpidem CR (AMBIEN CR) 12.5 mg tablet Take 12.5 mg by mouth nightly as needed for Sleep.  DIAZEPAM (VALIUM PO) Take 5 mg by mouth three (3) times daily as needed.  cycloSPORINE (RESTASIS) 0.05 % ophthalmic emulsion Administer 1 Drop to both eyes two (2) times a day. Visit Vitals    /78 (BP 1 Location: Right arm, BP Patient Position: Sitting)    Pulse (!) 54    Temp 98.5 °F (36.9 °C) (Tympanic)    Resp 16    Ht 5' 2\" (1.575 m)    Wt 131 lb (59.4 kg)    SpO2 99%    BMI 23.96 kg/m2       PE  Well nourished in NAD  HEENT:   OP: clear. Neck: supple w/o mass or bruits. Chest: clear. CV: RRR w/o m,r,g; pulses intact. Abd: soft, NT, w/o HSM or mass. Ext: w/o edema. Neuro: NF.     Assessment and Plan    Encounter Diagnoses   Name Primary?     Routine general medical examination at a health care facility Yes    Essential hypertension, benign     Acquired hypothyroidism     Encounter for immunization    HTN - controlled  Hypothyroidism - continue replacement  Labs soon to assess metabolic parameters  [place] neg 3/2017; mammogram neg July 2016; flu vaccine given  Continue dietary/exercise efforts  Keep f/u with consultants for management of pemphigus, depression, back pain  No change in rx  OV 12 mos or prn  I have explained plan to patient and the patient verbalizes understanding

## 2017-08-23 NOTE — PROGRESS NOTES
1. Have you been to the ER, urgent care clinic since your last visit? Hospitalized since your last visit? No    2. Have you seen or consulted any other health care providers outside of the [address] [place] [address] since your last visit? Include any pap smears or colon screening.  No

## 2017-11-07 ENCOUNTER — HOSPITAL ENCOUNTER (OUTPATIENT)
Dept: MAMMOGRAPHY | Age: 62
Discharge: HOME | End: 2017-11-07
Attending: ADVANCED PRACTICE MIDWIFE
Payer: COMMERCIAL

## 2017-11-07 DIAGNOSIS — Z12.31 VISIT FOR SCREENING MAMMOGRAM: ICD-10-CM

## 2017-11-07 PROCEDURE — 77063 BREAST TOMOSYNTHESIS BI: CPT

## 2017-11-10 ENCOUNTER — OFFICE VISIT (OUTPATIENT)
Dept: ORTHOPEDIC SURGERY | Age: 62
End: 2017-11-10

## 2017-11-10 VITALS — HEART RATE: 88 BPM | HEIGHT: 62 IN | DIASTOLIC BLOOD PRESSURE: 53 MMHG | SYSTOLIC BLOOD PRESSURE: 95 MMHG

## 2017-11-10 DIAGNOSIS — M51.37 OTHER INTERVERTEBRAL DISC DEGENERATION, LUMBOSACRAL REGION: ICD-10-CM

## 2017-11-10 DIAGNOSIS — M54.14 RADICULOPATHY, THORACIC REGION: ICD-10-CM

## 2017-11-10 DIAGNOSIS — M46.1 SACROILIITIS (HCC): Primary | ICD-10-CM

## 2017-11-10 NOTE — MR AVS SNAPSHOT
Visit Information Date & Time Provider Department Dept. Phone Encounter #  
 11/10/2017 10:50 AM Darius Morse  [facility], [address] and [facility] [place] [phone] [id number] Follow-up Instructions Return in about 6 months (around 5/10/2018). Upcoming Health Maintenance Date Due Hepatitis C Screening 1955 ZOSTER VACCINE AGE 60> 4/23/2016 PAP AKA CERVICAL CYTOLOGY 5/1/2017 BREAST CANCER SCRN MAMMOGRAM 11/7/2019 DTaP/Tdap/Td series (2 - Td) 1/1/2022 COLONOSCOPY 3/21/2022 Allergies as of 11/10/2017  Review Complete On: 11/10/2017 By: Darius Morse MD  
  
 Severity Noted Reaction Type Reactions Sulfa (Sulfonamide Antibiotics)  05/06/2015    Hives Current Immunizations  Never Reviewed Name Date Influenza Vaccine (Quad) PF 8/23/2017 Pneumococcal Polysaccharide (PPSV-23) 7/1/2012 Tdap 1/1/2012 Not reviewed this visit You Were Diagnosed With   
  
 Codes Comments [facility]    -  Primary ICD-10-CM: M46.1 ICD-9-CM: 720.2 Radiculopathy, thoracic region     ICD-10-CM: M54.14 
ICD-9-CM: 724.4 Other intervertebral disc degeneration, lumbosacral region     ICD-10-CM: M51.37 
ICD-9-CM: 722.52 Vitals BP Pulse Height(growth percentile) OB Status Smoking Status 95/53 88 5' 2\" (1.575 m) Postmenopausal Never Smoker Vitals History Preferred Pharmacy Pharmacy Name Phone [address], [address] Your Updated Medication List  
  
   
This list is accurate as of: 11/10/17 11:26 AM.  Always use your most recent med list.  
  
  
  
  
 alendronate 70 mg tablet Commonly known as:  FOSAMAX Take 70 mg by mouth every seven (7) days. AMBIEN CR 12.5 mg tablet Generic drug:  zolpidem CR Take 12.5 mg by mouth nightly as needed for Sleep. amLODIPine 10 mg tablet Commonly known as:  Jaime Lobo Take 1 Tab by mouth daily. CELLCEPT PO Take 1 g by mouth two (2) times a day. doxycycline 100 mg tablet Commonly known as:  VIBRA-TABS  
100 mg two (2) times a day. levothyroxine 150 mcg tablet Commonly known as:  SYNTHROID  
1 qd  
  
 lidocaine 5 % Commonly known as:  Manny Chavez Apply patch to the affected area for 12 hours a day and remove for 12 hours a day. MAXALT PO Take  by mouth daily as needed. MYRBETRIQ 25 mg ER tablet Generic drug:  mirabegron ER  
take 1 tablet by mouth once daily if needed  
  
 oxyCODONE-acetaminophen 5-325 mg per tablet Commonly known as:  PERCOCET Take 1 Tab by mouth daily. Max Daily Amount: 1 Tab. polyethylene glycol 17 gram/dose powder Commonly known as:  MIRALAX  
  
 predniSONE 5 mg tablet Commonly known as:  Suri Pound Take 5 mg by mouth every other day. RESTASIS 0.05 % ophthalmic emulsion Generic drug:  cycloSPORINE Administer 1 Drop to both eyes two (2) times a day. TOPAMAX 100 mg tablet Generic drug:  topiramate Take  by mouth daily. valACYclovir 1 gram tablet Commonly known as:  VALTREX Take 1 Tab by mouth daily. VALIUM PO Take 5 mg by mouth three (3) times daily as needed. WELLBUTRIN  mg XL tablet Generic drug:  buPROPion XL Take 300 mg by mouth every morning. Follow-up Instructions Return in about 6 months (around 5/10/2018). To-Do List   
 12/21/2017 9:30 AM  
  Appointment with [facility] BONE DEXA RM 1 at [facility] RAD BONE DENSITY ([phone]) OUTSIDE FILMS  - Any outside films related to the study being scheduled should be brought with you on the day of the exam.  If this cannot be done there may be a delay in the reading of the study.   MEDICATIONS  - Patient must bring a complete list of all medications currently taking to include prescriptions, over-the-counter meds, herbals, vitamins & any dietary supplements - Patient must discontinue use of calcium, vitamins, or calcium supplements including antacids (calcium based) 24 hours before scan. GENERAL INSTRUCTIONS  - [facility] cannot accommodate patients on stretchers, patient must be able to walk into the room and be able to sit up for a portion of the exam.  
  
  
Introducing \[facility]\"" & HEALTH SERVICES! Tomyemanuel Alatorre introduces [employer] patient portal. Now you can access parts of your medical record, email your doctor's office, and request medication refills online. 1. In your internet browser, go to [email or web address]://[facility]. [employer]/[facility] 2. Click on the First Time User? Click Here link in the Sign In box. You will see the New Member Sign Up page. 3. Enter your [employer] Access Code exactly as it appears below. You will not need to use this code after youve completed the sign-up process. If you do not sign up before the expiration date, you must request a new code. · [employer] Access Code: [email or web address] Expires: 2/6/2018 10:34 AM 
 
4. Enter the last four digits of your Social Security Number (xxxx) and Date of Birth (mm/dd/yyyy) as indicated and click Submit. You will be taken to the next sign-up page. 5. Create a [employer] ID. This will be your [employer] login ID and cannot be changed, so think of one that is secure and easy to remember. 6. Create a [employer] password. You can change your password at any time. 7. Enter your Password Reset Question and Answer. This can be used at a later time if you forget your password. 8. Enter your e-mail address. You will receive e-mail notification when new information is available in [address] 9. Click Sign Up. You can now view and download portions of your medical record. 10. Click the Download Summary menu link to download a portable copy of your medical information. If you have questions, please visit the Frequently Asked Questions section of the [employer] website.  Remember, [employer] is NOT to be used for urgent needs. For medical emergencies, dial 911. Now available from your iPhone and Android! Please provide this summary of care documentation to your next provider. Your primary care clinician is listed as Tomas Martinez. If you have any questions after today's visit, please call [phone].

## 2017-11-10 NOTE — PROGRESS NOTES
[facility]  [address], [address]  Phone: [phone]  Fax: [phone]        PROGRESS NOTE      HISTORY OF PRESENT ILLNESS:  The patient is a 65 y.o. female and was seen today for follow up of right buttock pain extending in an S1 distribution into the knee. She denies symptoms in the LLE at this time. She described symptoms consistent for stenosis. Pt states she has been working again. Her pain is not positional. Examination continues to be consistent with sacroiliitis on the right. She denies hx of lumbar spinal surgery. Pt received lumbar blocks back in 2012 with good relief. Pt underwent right SI joint injection on 4/20/17 with temporary relief x 10 days. She has seen a chiropractor in the past with benefit. Pt reports overall improvement in symptoms with physical therapy. She is taking Percocet prn for pain relief. Pt states she d/c'd Fosamax due to GI upset. She is currently taking Topamax 100 mg BID. 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 Noted, patient is followed by an endocrinologist in [place]. Pt denies h/o DM. Preliminary reading of lumbosacral plain films revealed mild disc space narrowing L5-S1. Anterior osteophytes L5. No acute pathology identified. Lumbar spine MRI dated 1/30/17 reviewed. Per report, there was multilevel degenerative changes with mild areas of canal and foraminal encroachment. Left lateral disc osteophyte complexes noted mildly contacting and displacing the exiting left L5 nerve root. Small right paracentral disc protrusion at L1-2. Pelvic MRI dated 1/30/17 was reviewed. Per report, no acute findings. Mild bilateral SI joint osteoarthritic changes with evidence of prior healed insufficiency fracture along the upper anterior aspect of the right sacral ala. Lower lumbar spondylosis at L4-L5 and L5-S1.  At her last clinical appointment, patient reported overall improvement in symptoms with physical therapy. She was given refills of her Lidoderm patches. I encouraged patient to perform her HEP daily. The patient returns today with pain location and distribution remain unchanged. She rates pain 0-5/10, elevated since her last visit (0-1/10). Pt has been using the Lidoderm patches 1x/week prn with benefit. She has been completing her HEP daily. Bone density test has been scheduled for 12/21/17. She states does have a diagnosis of osteoporosis.  reviewed. Past Medical History:   Diagnosis Date    Arthritis     Back pain     Bursitis of right shoulder     subacromial    Degenerative arthritis of right knee     Depression     [facility] leg [facility] ([facility]) 7/2015    (L) ankle fx    GERD (gastroesophageal reflux disease)     Herniated disc     Herpes simplex     genital    Hypertension     IBS (irritable bowel syndrome)     Lower extremity pain, bilateral     Migraine headache     Pemphigus vulgaris     Right knee pain     Right shoulder pain     Rosacea     Thyroid disease     Venous (peripheral) insufficiency     Wears glasses         Social History     Social History    Marital status:      Spouse name: N/A    Number of children: N/A    Years of education: N/A     Occupational History    Not on file. Social History Main Topics    Smoking status: Never Smoker    Smokeless tobacco: Never Used    Alcohol use 0.0 oz/week     0 Standard drinks or equivalent per week      Comment: One glass of wine per month    Drug use: No    Sexual activity: Not on file     Other Topics Concern    Not on file     Social History Narrative       Current Outpatient Prescriptions   Medication Sig Dispense Refill    amLODIPine (NORVASC) 10 mg tablet Take 1 Tab by mouth daily.  90 Tab 1    levothyroxine (SYNTHROID) 150 mcg tablet 1 qd 90 Tab 3    MYRBETRIQ 25 mg ER tablet take 1 tablet by mouth once daily if needed  0    lidocaine (LIDODERM) 5 % Apply patch to the affected area for 12 hours a day and remove for 12 hours a day. 30 Each 1    polyethylene glycol (MIRALAX) 17 gram/dose powder   1    doxycycline (VIBRA-TABS) 100 mg tablet 100 mg two (2) times a day.  oxyCODONE-acetaminophen (PERCOCET) 5-325 mg per tablet Take 1 Tab by mouth daily. Max Daily Amount: 1 Tab. 30 Tab 0    alendronate (FOSAMAX) 70 mg tablet Take 70 mg by mouth every seven (7) days.  valACYclovir (VALTREX) 1 gram tablet Take 1 Tab by mouth daily. 90 Tab 3    predniSONE (DELTASONE) 5 mg tablet Take 5 mg by mouth every other day.  buPROPion XL (WELLBUTRIN XL) 300 mg XL tablet Take 300 mg by mouth every morning.  topiramate (TOPAMAX) 100 mg tablet Take  by mouth daily.  RIZATRIPTAN BENZOATE (MAXALT PO) Take  by mouth daily as needed.  zolpidem CR (AMBIEN CR) 12.5 mg tablet Take 12.5 mg by mouth nightly as needed for Sleep.  DIAZEPAM (VALIUM PO) Take 5 mg by mouth three (3) times daily as needed.  cycloSPORINE (RESTASIS) 0.05 % ophthalmic emulsion Administer 1 Drop to both eyes two (2) times a day.  MYCOPHENOLATE MOFETIL (CELLCEPT PO) Take 1 g by mouth two (2) times a day. Allergies   Allergen Reactions    Sulfa (Sulfonamide Antibiotics) Hives          PHYSICAL EXAMINATION    Visit Vitals    BP 95/53    Pulse 88    Ht 5' 2\" (1.575 m)       CONSTITUTIONAL: NAD, A&O x 3  SENSATION: Intact to light touch throughout  RANGE OF MOTION: The patient has full passive range of motion in all four extremities. MOTOR:  Straight Leg Raise: Negative, bilateral               Hip Flex Knee Ext Knee Flex Ankle DF GTE Ankle PF Tone   Right +4/5 +4/5 +4/5 +4/5 +4/5 +4/5 +4/5   Left +4/5 +4/5 +4/5 +4/5 +4/5 +4/5 +4/5       ASSESSMENT   Diagnoses and all orders for this visit:    1. Sacroiliitis (HCC)    2. Radiculopathy, thoracic region    3.  Other intervertebral disc degeneration, lumbosacral region          IMPRESSION AND PLAN:  Patient wished to continue her current treatment. She does not need refills of her Lidoderm patches at this time. I encourage patient to perform her HEP daily. I will see the patient back in 6 month's time or earlier if needed. Written by Vanessa Giron, as dictated by Khari Young MD  I examined the patient, reviewed and agree with the note.

## 2017-12-21 ENCOUNTER — HOSPITAL ENCOUNTER (OUTPATIENT)
Dept: BONE DENSITY | Age: 62
Discharge: HOME | End: 2017-12-21
Attending: ADVANCED PRACTICE MIDWIFE
Payer: COMMERCIAL

## 2017-12-21 DIAGNOSIS — Z13.820 ENCOUNTER FOR SCREENING FOR OSTEOPOROSIS: ICD-10-CM

## 2017-12-21 PROCEDURE — 77080 DXA BONE DENSITY AXIAL: CPT

## 2018-02-20 RX ORDER — AMLODIPINE BESYLATE 10 MG/1
10 TABLET ORAL DAILY
Qty: 90 TAB | Refills: 1 | Status: SHIPPED | OUTPATIENT
Start: 2018-02-20 | End: 2018-07-07 | Stop reason: SDUPTHER

## 2018-02-20 RX ORDER — LEVOTHYROXINE SODIUM 150 UG/1
TABLET ORAL
Qty: 90 TAB | Refills: 1 | Status: SHIPPED | OUTPATIENT
Start: 2018-02-20 | End: 2018-07-07 | Stop reason: SDUPTHER

## 2018-02-27 DIAGNOSIS — M46.1 SACROILIITIS (HCC): ICD-10-CM

## 2018-02-27 RX ORDER — LIDOCAINE 50 MG/G
PATCH TOPICAL
Qty: 90 EACH | Refills: 0 | Status: SHIPPED | OUTPATIENT
Start: 2018-02-27

## 2018-02-27 NOTE — TELEPHONE ENCOUNTER
[facility] requests a 90 d/s of medication on the patient's behalf    Last Visit: 11/10/2017 with MD Casey Steel    Next Appointment: 05/09/2018 with MD Casey Steel   Previous Refill Encounters: 08/10/2017 per MD [id number] with 1 refill    Requested Prescriptions     Pending Prescriptions Disp Refills    lidocaine (LIDODERM) 5 % 90 Each 0     Sig: Apply 1 patch to the affected area for 12 hours a day and remove for 12 hours a day.

## 2018-04-04 ENCOUNTER — OFFICE VISIT (OUTPATIENT)
Dept: ORTHOPEDIC SURGERY | Age: 63
End: 2018-04-04

## 2018-04-04 VITALS
SYSTOLIC BLOOD PRESSURE: 118 MMHG | DIASTOLIC BLOOD PRESSURE: 70 MMHG | WEIGHT: 134 LBS | HEIGHT: 62 IN | TEMPERATURE: 98.9 F | RESPIRATION RATE: 18 BRPM | BODY MASS INDEX: 24.66 KG/M2 | OXYGEN SATURATION: 99 % | HEART RATE: 83 BPM

## 2018-04-04 DIAGNOSIS — M17.12 PRIMARY OSTEOARTHRITIS OF LEFT KNEE: Primary | ICD-10-CM

## 2018-04-04 DIAGNOSIS — M25.562 CHRONIC PAIN OF LEFT KNEE: ICD-10-CM

## 2018-04-04 DIAGNOSIS — G89.29 CHRONIC PAIN OF LEFT KNEE: ICD-10-CM

## 2018-04-04 NOTE — PROGRESS NOTES
Luis Palacios  1955   Chief Complaint   Patient presents with    Knee Pain     Left        HISTORY OF PRESENT ILLNESS  Luis Palacios is a 65 y.o. female who presents today for evaluation of left knee pain. she rates her pain 0/10 today. Pain has been present for many years, worse for the last month. Patient describes the pain as aching that is Intermittent in nature. Symptoms are worse with prolonged walking and standing, Activity and is better with  Rest. Associated symptoms include stiffness, \"Hollow\" feeling, crunching. Since problem started, it: has worsened. Pain does not wake patient up at night. Has taken no recent meds for the problem. H/o of left knee meniscus surgery 6 years ago. She has tried supartz and synvisc injections three years ago. Has tried following treatments: Injections:YES; Brace:NO; Therapy:NO; Cane/Crutch:NO       Allergies   Allergen Reactions    Sulfa (Sulfonamide Antibiotics) Hives        Past Medical History:   Diagnosis Date    Arthritis     Back pain     Bursitis of right shoulder     subacromial    Degenerative arthritis of right knee     Depression     [facility] 7/2015    (L) ankle fx    GERD (gastroesophageal reflux disease)     Herniated disc     Herpes simplex     genital    Hypertension     IBS (irritable bowel syndrome)     Lower extremity pain, bilateral     Migraine headache     Pemphigus vulgaris     Right knee pain     Right shoulder pain     Rosacea     Thyroid disease     Venous (peripheral) insufficiency     Wears glasses       Social History     Social History    Marital status:      Spouse name: N/A    Number of children: N/A    Years of education: N/A     Occupational History    Not on file.      Social History Main Topics    Smoking status: Never Smoker    Smokeless tobacco: Never Used    Alcohol use 0.0 oz/week     0 Standard drinks or equivalent per week      Comment: One glass of wine per month    Drug use: No    Sexual activity: Not on file     Other Topics Concern    Not on file     Social History Narrative      Past Surgical History:   Procedure Laterality Date    HX APPENDECTOMY      HX COLONOSCOPY  9/2011; 12/23/16; 3/21/17    neg    HX MENISCUS REPAIR      HX SEPTOPLASTY      HX VEIN STRIPPING        Family History   Problem Relation Age of Onset    Cancer Mother     Heart Disease Father     Hypertension Father     Cancer Maternal Aunt      breast cancer      Current Outpatient Prescriptions   Medication Sig    lidocaine (LIDODERM) 5 % Apply 1 patch to the affected area for 12 hours a day and remove for 12 hours a day.  levothyroxine (SYNTHROID) 150 mcg tablet 1 qd    amLODIPine (NORVASC) 10 mg tablet Take 1 Tab by mouth daily.  MYRBETRIQ 25 mg ER tablet take 1 tablet by mouth once daily if needed    polyethylene glycol (MIRALAX) 17 gram/dose powder     doxycycline (VIBRA-TABS) 100 mg tablet 100 mg two (2) times a day.  valACYclovir (VALTREX) 1 gram tablet Take 1 Tab by mouth daily.  predniSONE (DELTASONE) 5 mg tablet Take 5 mg by mouth every other day.  MYCOPHENOLATE MOFETIL (CELLCEPT PO) Take 1 g by mouth two (2) times a day.  buPROPion XL (WELLBUTRIN XL) 300 mg XL tablet Take 300 mg by mouth every morning.  DIAZEPAM (VALIUM PO) Take 5 mg by mouth three (3) times daily as needed.  cycloSPORINE (RESTASIS) 0.05 % ophthalmic emulsion Administer 1 Drop to both eyes two (2) times a day.  oxyCODONE-acetaminophen (PERCOCET) 5-325 mg per tablet Take 1 Tab by mouth daily. Max Daily Amount: 1 Tab.  alendronate (FOSAMAX) 70 mg tablet Take 70 mg by mouth every seven (7) days.  topiramate (TOPAMAX) 100 mg tablet Take  by mouth daily.  RIZATRIPTAN BENZOATE (MAXALT PO) Take  by mouth daily as needed.  zolpidem CR (AMBIEN CR) 12.5 mg tablet Take 12.5 mg by mouth nightly as needed for Sleep.      No current facility-administered medications for this visit. REVIEW OF SYSTEM   Patient denies: Weight loss, Fever/Chills, HA, Visual changes, Fatigue, Chest pain, SOB, Abdominal pain, N/V/D/C, Blood in stool or urine, Edema. Pertinent positive as above in HPI. All others were negative    PHYSICAL EXAM:   Visit Vitals    /70    Pulse 83    Temp 98.9 °F (37.2 °C) (Oral)    Resp 18    Ht 5' 2\" (1.575 m)    Wt 134 lb (60.8 kg)    SpO2 99%    BMI 24.51 kg/m2     The patient is a well-developed, well-nourished female   in no acute distress. The patient is alert and oriented times three. The patient is alert and oriented times three. Mood and affect are normal.  LYMPHATIC: lymph nodes are not enlarged and are within normal limits  SKIN: normal in color and non tender to palpation. There are no bruises or abrasions noted. NEUROLOGICAL: Motor sensory exam is within normal limits. Reflexes are equal bilaterally. There is normal sensation to pinprick and light touch  MUSCULOSKELETAL:  Examination Left knee   Skin Intact   Range of motion 0-120   Effusion +   Medial joint line tenderness -   Lateral joint line tenderness -   Tenderness Pes Bursa -   Tenderness insertion MCL -   Tenderness insertion LCL -   Zoilas -   Patella crepitus +   Patella grind -   Lachman -   Pivot shift -   Anterior drawer -   Posterior drawer -   Varus stress -   Valgus stress -   Neurovascular Intact   Calf Swelling and Tenderness to Palpation -   Mulu's Test -   Hamstring Cord Tightness -         IMAGING: XR of the left knee dated 4/4/18 was reviewed and read: degenerative arthritis in the patellofemoral joint, mild joint space narrowing in medial compartment      IMPRESSION:      ICD-10-CM ICD-9-CM    1. Primary osteoarthritis of left knee M17.12 715.16 PROCEDURE AUTHORIZATION TO    2. Chronic pain of left knee M25.562 719.46 AMB POC XRAY, KNEE; 1/2 VIEWS    G89.29 338.29         PLAN:  1.  Patient received limited relief from previous cortisone injection in the left knee. I discussed proceeding with visco supplementation. We will seek authorization for Euflexxa. Patient has had success with previous visco supplementation series. Patient cannot have cortisone injections due to previous steroid prescriptions. Risk factors include: htn  2. No cortisone injection indicated today   3. No Physical/Occupational Therapy indicated today  4. No diagnostic test indicated today  5. No durable medical equipment indicated today  6. No referral indicated today   7. No medications indicated today  8.  No Narcotic indicated today     RTC following Euflexxa auth  Follow-up Disposition: Not on File    Scribed by Sheryl Alexander [address]) as dictated by JACKY Zimmerman [address] and [facility]ist

## 2018-04-25 ENCOUNTER — OFFICE VISIT (OUTPATIENT)
Dept: ORTHOPEDIC SURGERY | Age: 63
End: 2018-04-25

## 2018-04-25 VITALS
RESPIRATION RATE: 18 BRPM | TEMPERATURE: 99.4 F | DIASTOLIC BLOOD PRESSURE: 80 MMHG | HEIGHT: 62 IN | BODY MASS INDEX: 24.95 KG/M2 | HEART RATE: 88 BPM | SYSTOLIC BLOOD PRESSURE: 124 MMHG | OXYGEN SATURATION: 100 % | WEIGHT: 135.6 LBS

## 2018-04-25 DIAGNOSIS — M17.12 PRIMARY OSTEOARTHRITIS OF LEFT KNEE: Primary | ICD-10-CM

## 2018-04-25 RX ORDER — HYALURONATE SODIUM 10 MG/ML
2 SYRINGE (ML) INTRAARTICULAR ONCE
Qty: 2 ML | Refills: 0
Start: 2018-04-25 | End: 2018-04-25

## 2018-04-25 NOTE — PROGRESS NOTES
Patient: Candida Webster                MRN: [id number]       SSN: [id number]  YOB: 1955        AGE: 65 y.o. SEX: female  Body mass index is 24.8 kg/(m^2). PCP: Hubert Garnett MD  04/25/18    Chief Complaint   Patient presents with    Knee Pain     Left       HISTORY:  Candida Webster is a 65 y.o. female who is seen for Left knee and first Euflexxa injection. PROCEDURE:  Patient's Left knee, after timeout under sterile conditions, was injected with 2 cc of Euflexxa. [facility]  OFFICE PROCEDURE PROGRESS NOTE        Chart reviewed for the following:   Ceferino CAMPOS PA-C, have reviewed the History, Physical and updated the Allergic reactions for Betzy Liu 139 performed immediately prior to start of procedure:   Ceferino CAMPOS PA-C, have performed the following reviews on Candida Webster prior to the start of the procedure:            * Patient was identified by name and date of birth   * Agreement on procedure being performed was verified  * Risks and Benefits explained to the patient  * Procedure site verified and marked as necessary  * Patient was positioned for comfort  * Consent was signed and verified     Time: 2:09 PM       Date of procedure: 4/25/2018    Procedure performed by:  Ceferino Jay PA-C    Provider assisted by: None     How tolerated by patient: tolerated the procedure well with no complications    Comments: none    IMPRESSION:     ICD-10-CM ICD-9-CM    1. Primary osteoarthritis of left knee M17.12 715.16 CO DRAIN/INJECT LARGE JOINT/BURSA      EUFLEXXA INJECTION PER DOSE      sodium hyaluronate (SUPARTZ FX/HYALGAN/GENIVSC) 10 mg/mL syrg injection        PLAN:  Ms. Raúl Dorantes will return in one week for her second Euflexxa injection.     Scribed by Jason Ortiz ([address]) as dictated by JACKY Centeno PA-C  [address] [facility]

## 2018-04-26 ENCOUNTER — OFFICE VISIT (OUTPATIENT)
Dept: ORTHOPEDIC SURGERY | Age: 63
End: 2018-04-26

## 2018-04-26 VITALS
RESPIRATION RATE: 16 BRPM | BODY MASS INDEX: 25.1 KG/M2 | DIASTOLIC BLOOD PRESSURE: 75 MMHG | HEIGHT: 62 IN | SYSTOLIC BLOOD PRESSURE: 113 MMHG | HEART RATE: 87 BPM | WEIGHT: 136.4 LBS

## 2018-04-26 DIAGNOSIS — M51.37 OTHER INTERVERTEBRAL DISC DEGENERATION, LUMBOSACRAL REGION: ICD-10-CM

## 2018-04-26 DIAGNOSIS — M54.14 RADICULOPATHY, THORACIC REGION: ICD-10-CM

## 2018-04-26 DIAGNOSIS — M46.1 SACROILIITIS (HCC): Primary | ICD-10-CM

## 2018-04-26 NOTE — PROGRESS NOTES
[facility]  [address], [address]  Phone: [phone]  Fax: [phone]        PROGRESS NOTE      HISTORY OF PRESENT ILLNESS:  The patient is a 65 y.o. female and was seen today for follow up of right buttock pain extending in an S1 distribution into the knee. She described symptoms consistent for stenosis. Pt states she has been working again. Her pain is not positional. Examination continues to be consistent with sacroiliitis on the right. She denies hx of lumbar spinal surgery. Pt received lumbar blocks back in 2012 with good relief. Pt underwent right SI joint injection on 4/20/17 with temporary relief x 10 days. She has seen a chiropractor in the past with benefit. Pt reports overall improvement in symptoms with physical therapy. She is taking Percocet prn for pain relief. Pt states she d/c'd Fosamax due to GI upset. She is currently taking Topamax 100 mg BID. 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 Noted, patient is followed by an endocrinologist in [place]. Pt denies h/o DM. Preliminary reading of lumbosacral plain films revealed mild disc space narrowing L5-S1. Anterior osteophytes L5. No acute pathology identified. Lumbar spine MRI dated 1/30/17 reviewed. Per report, there was multilevel degenerative changes with mild areas of canal and foraminal encroachment. Left lateral disc osteophyte complexes noted mildly contacting and displacing the exiting left L5 nerve root. Small right paracentral disc protrusion at L1-2. Pelvic MRI dated 1/30/17 was reviewed. Per report, no acute findings. Mild bilateral SI joint osteoarthritic changes with evidence of prior healed insufficiency fracture along the upper anterior aspect of the right sacral ala. Lower lumbar spondylosis at L4-L5 and L5-S1. At her last clinical appointment, patient wished to continue her current treatment.  She did not need refills of her Lidoderm patches at that time. I encouraged patient to perform her HEP daily. The patient returns today with right buttock pain extending in an S1 distribution into the knee. Pt reports having a single episode of LLE radicular type symptoms. She rates pain 0-4/10, a slight decrease since her last visit (0-5/10). Pt has been using the Lidoderm patches 1-2x/week prn with benefit. She has been completing her HEP daily. Pt states does have a diagnosis of osteoporosis by bone density from 12/21/17. She will be receiving Prolia injections as Fosamax has been ineffective.  reviewed. Body mass index is 24.95 kg/(m^2). PCP: Srinivasa Zaragoza MD      Past Medical History:   Diagnosis Date    Arthritis     Back pain     Bursitis of right shoulder     subacromial    Degenerative arthritis of right knee     Depression     DVT of leg (deep venous thrombosis) ([facility]) 7/2015    (L) ankle fx    GERD (gastroesophageal reflux disease)     Herniated disc     Herpes simplex     genital    Hypertension     IBS (irritable bowel syndrome)     Lower extremity pain, bilateral     Migraine headache     Pemphigus vulgaris     Right knee pain     Right shoulder pain     Rosacea     Thyroid disease     Venous (peripheral) insufficiency     Wears glasses         Social History     Social History    Marital status:      Spouse name: N/A    Number of children: N/A    Years of education: N/A     Occupational History    Not on file.      Social History Main Topics    Smoking status: Never Smoker    Smokeless tobacco: Never Used    Alcohol use 0.0 oz/week     0 Standard drinks or equivalent per week      Comment: One glass of wine per month    Drug use: No    Sexual activity: Not on file     Other Topics Concern    Not on file     Social History Narrative       Current Outpatient Prescriptions   Medication Sig Dispense Refill    lidocaine (LIDODERM) 5 % Apply 1 patch to the affected area for 12 hours a day and remove for 12 hours a day. 90 Each 0    levothyroxine (SYNTHROID) 150 mcg tablet 1 qd 90 Tab 1    amLODIPine (NORVASC) 10 mg tablet Take 1 Tab by mouth daily. 90 Tab 1    MYRBETRIQ 25 mg ER tablet take 1 tablet by mouth once daily if needed  0    polyethylene glycol (MIRALAX) 17 gram/dose powder   1    doxycycline (VIBRA-TABS) 100 mg tablet 100 mg two (2) times a day.  valACYclovir (VALTREX) 1 gram tablet Take 1 Tab by mouth daily. 90 Tab 3    predniSONE (DELTASONE) 5 mg tablet Take 5 mg by mouth every other day.  MYCOPHENOLATE MOFETIL (CELLCEPT PO) Take 1 g by mouth two (2) times a day.  buPROPion XL (WELLBUTRIN XL) 300 mg XL tablet Take 400 mg by mouth every morning.  topiramate (TOPAMAX) 100 mg tablet Take  by mouth daily.  zolpidem CR (AMBIEN CR) 12.5 mg tablet Take 12.5 mg by mouth nightly as needed for Sleep.  DIAZEPAM (VALIUM PO) Take 5 mg by mouth three (3) times daily as needed.  cycloSPORINE (RESTASIS) 0.05 % ophthalmic emulsion Administer 1 Drop to both eyes two (2) times a day.  oxyCODONE-acetaminophen (PERCOCET) 5-325 mg per tablet Take 1 Tab by mouth daily. Max Daily Amount: 1 Tab. (Patient not taking: Reported on 4/26/2018) 30 Tab 0    alendronate (FOSAMAX) 70 mg tablet Take 70 mg by mouth every seven (7) days.  RIZATRIPTAN BENZOATE (MAXALT PO) Take  by mouth daily as needed. Allergies   Allergen Reactions    Sulfa (Sulfonamide Antibiotics) Hives          PHYSICAL EXAMINATION    Visit Vitals    /75    Pulse 87    Resp 16    Ht 5' 2\" (1.575 m)    Wt 61.9 kg (136 lb 6.4 oz)    BMI 24.95 kg/m2       CONSTITUTIONAL: NAD, A&O x 3  SENSATION: Intact to light touch throughout  RANGE OF MOTION: The patient has full passive range of motion in all four extremities.   MOTOR:  Straight Leg Raise: Negative, bilateral               Hip Flex Knee Ext Knee Flex Ankle DF GTE Ankle PF Tone   Right +4/5 +4/5 +4/5 +4/5 +4/5 +4/5 +4/5   Left +4/5 +4/5 +4/5 +4/5 +4/5 +4/5 +4/5       ASSESSMENT   Diagnoses and all orders for this visit:    1. Sacroiliitis (HCC)    2. Radiculopathy, thoracic region    3. Other intervertebral disc degeneration, lumbosacral region          IMPRESSION AND PLAN:  Patient continues to remain stable. She is neurologically intact. Patient may receive further refills of her Lidocaine patches through PCP. I encourage patient to perform her HEP daily. I will see the patient back on an as-needed basis. Written by Saranya Harden, as dictated by Sarah Terry MD  I examined the patient, reviewed and agree with the note.

## 2018-05-02 ENCOUNTER — OFFICE VISIT (OUTPATIENT)
Dept: ORTHOPEDIC SURGERY | Age: 63
End: 2018-05-02

## 2018-05-02 VITALS
SYSTOLIC BLOOD PRESSURE: 116 MMHG | WEIGHT: 135 LBS | HEART RATE: 80 BPM | RESPIRATION RATE: 16 BRPM | OXYGEN SATURATION: 98 % | DIASTOLIC BLOOD PRESSURE: 78 MMHG | HEIGHT: 62 IN | BODY MASS INDEX: 24.84 KG/M2 | TEMPERATURE: 97.2 F

## 2018-05-02 DIAGNOSIS — M17.12 PRIMARY OSTEOARTHRITIS OF LEFT KNEE: Primary | ICD-10-CM

## 2018-05-02 RX ORDER — HYALURONATE SODIUM 10 MG/ML
2 SYRINGE (ML) INTRAARTICULAR ONCE
Qty: 2 ML | Refills: 0
Start: 2018-05-02 | End: 2018-05-02

## 2018-05-02 NOTE — PROGRESS NOTES
Patient: Jarad Busby                MRN: [id number]       SSN: [id number]  YOB: 1955        AGE: 65 y.o. SEX: female  Body mass index is 24.69 kg/(m^2). PCP: Allison Polanco MD  05/02/18    Chief Complaint   Patient presents with    Knee Pain     L KNEE PAIN        HISTORY:  Jarad Busby is a 65 y.o. female who is seen for reevaluation of Left knee and here for 2nd injection of Euflexxa. PROCEDURE:  Patient's Left knee, after timeout under sterile conditions, was injected with 2 cc of Euflexxa. [facility]  OFFICE PROCEDURE PROGRESS NOTE        Chart reviewed for the following:   Denise CAMPOS PA-C, have reviewed the History, Physical and updated the Allergic reactions for [address] performed immediately prior to start of procedure:   Denise CAMPOS PA-C, have performed the following reviews on Jarad Busby prior to the start of the procedure:            * Patient was identified by name and date of birth   * Agreement on procedure being performed was verified  * Risks and Benefits explained to the patient  * Procedure site verified and marked as necessary  * Patient was positioned for comfort  * Consent was signed and verified     Time: 3:15 PM    Date of procedure: 5/2/2018    Procedure performed by:  Denise Alvarado PA-C    Provider assisted by: None     How tolerated by patient: tolerated the procedure well with no complications    Comments: none    IMPRESSION:     ICD-10-CM ICD-9-CM    1. Primary osteoarthritis of left knee M17.12 715.16 SC DRAIN/INJECT LARGE JOINT/BURSA      EUFLEXXA INJECTION PER DOSE      sodium hyaluronate (SUPARTZ FX/HYALGAN/GENIVSC) 10 mg/mL syrg injection        PLAN:  Ms. Will Newell will return in one week for her third Euflexxa injection.       Scribed by Donnell Brambila ([address]) as dictated by JACKY Ayers PA-C [address] and Spine Specialist

## 2018-05-09 ENCOUNTER — OFFICE VISIT (OUTPATIENT)
Dept: ORTHOPEDIC SURGERY | Age: 63
End: 2018-05-09

## 2018-05-09 VITALS
TEMPERATURE: 97.4 F | BODY MASS INDEX: 24.84 KG/M2 | DIASTOLIC BLOOD PRESSURE: 81 MMHG | OXYGEN SATURATION: 100 % | HEART RATE: 83 BPM | RESPIRATION RATE: 16 BRPM | SYSTOLIC BLOOD PRESSURE: 125 MMHG | WEIGHT: 135 LBS | HEIGHT: 62 IN

## 2018-05-09 DIAGNOSIS — G89.29 CHRONIC PAIN OF RIGHT KNEE: ICD-10-CM

## 2018-05-09 DIAGNOSIS — M17.11 PRIMARY OSTEOARTHRITIS OF RIGHT KNEE: ICD-10-CM

## 2018-05-09 DIAGNOSIS — M25.561 CHRONIC PAIN OF RIGHT KNEE: ICD-10-CM

## 2018-05-09 DIAGNOSIS — M17.12 PRIMARY OSTEOARTHRITIS OF LEFT KNEE: Primary | ICD-10-CM

## 2018-05-09 RX ORDER — HYALURONATE SODIUM 10 MG/ML
2 SYRINGE (ML) INTRAARTICULAR ONCE
Qty: 2 ML | Refills: 0
Start: 2018-05-09 | End: 2018-05-09

## 2018-05-09 NOTE — PROGRESS NOTES
Jeana Stacy  1955   Chief Complaint   Patient presents with    Knee Pain     PAIN IN BOTH KNEES LEFT IS BETTER FROM INJECTIONS        HISTORY OF PRESENT ILLNESS  Jeana Stacy is a 65 y.o. female who presents today for reevaluation of b/l knee pain. She is here today for third Euflexxa injection in the left knee. Patient rates pain as 3/10 today. Patient would like to start the process of getting visco supplementation for the right knee. She states she has had them in the past. Cortisone injections do not work for her right knee. She has problems on a day to day basis. Pain worse with activity better with rest. She feels like it is a dull and achy pain. Patient denies any fever, chills, chest pain, shortness of breath or calf pain. There are no new illness or injuries to report since last seen in the office. There are no changes to medications, allergies, family or social history. PHYSICAL EXAM:   Visit Vitals    /81    Pulse 83    Temp 97.4 °F (36.3 °C)    Resp 16    Ht 5' 2\" (1.575 m)    Wt 135 lb (61.2 kg)    SpO2 100%    BMI 24.69 kg/m2     The patient is a well-developed, well-nourished female   in no acute distress. The patient is alert and oriented times three. The patient is alert and oriented times three. Mood and affect are normal.  LYMPHATIC: lymph nodes are not enlarged and are within normal limits  SKIN: normal in color and non tender to palpation. There are no bruises or abrasions noted. NEUROLOGICAL: Motor sensory exam is within normal limits. Reflexes are equal bilaterally.  There is normal sensation to pinprick and light touch  MUSCULOSKELETAL:  Examination Left knee   Skin Intact   Range of motion 0-130   Effusion -   Medial joint line tenderness -   Lateral joint line tenderness -   Tenderness Pes Bursa -   Tenderness insertion MCL -   Tenderness insertion LCL -   Zoilas -   Patella crepitus -   Patella grind -   Lachman -   Pivot shift -   Anterior drawer -   Posterior drawer -   Varus stress -   Valgus stress -   Neurovascular Intact   Calf Swelling and Tenderness to Palpation -   Mulu's Test -   Hamstring Cord Tightness -     Examination Right knee   Skin Intact   Range of motion 0-120   Effusion -   Medial joint line tenderness +   Lateral joint line tenderness -   Tenderness Pes Bursa -   Tenderness insertion MCL -   Tenderness insertion LCL -   Zoilas -   Patella crepitus +   Patella grind -   Lachman -   Pivot shift -   Anterior drawer -   Posterior drawer -   Varus stress -   Valgus stress -   Neurovascular Intact   Calf Swelling and Tenderness to Palpation -   Mulu's Test -   Hamstring Cord Tightness -       PROCEDURE: After sterile prep, 2 cc of Euflexxa were injected into the left knee.        [facility]  OFFICE PROCEDURE PROGRESS NOTE        Chart reviewed for the following:  Evelio CAMPOS PA-C, have reviewed the History, Physical and updated the Allergic reactions for [address] performed immediately prior to start of procedure:  Evelio CAMPOS PA-C, have performed the following reviews on Chayito Alexander prior to the start of the procedure:            * Patient was identified by name and date of birth   * Agreement on procedure being performed was verified  * Risks and Benefits explained to the patient  * Procedure site verified and marked as necessary  * Patient was positioned for comfort  * Consent was signed and verified     Time: 2:13 PM    Date of procedure: 5/9/2018    Procedure performed by:  Evelio Chavez PA-C    Provider assisted by: (see medication administration)    How tolerated by patient: tolerated the procedure well with no complications    Comments: none        IMAGING: XR of the right knee dated 5/9/18 was reviewed and read: decreased joint space in medial compartment with degenerative changes in medial and patella femoral joint    XR of the left knee dated 4/4/18 was reviewed and read: degenerative arthritis in the patellofemoral joint, mild joint space narrowing in medial compartment    IMPRESSION:      ICD-10-CM ICD-9-CM    1. Primary osteoarthritis of left knee M17.12 715.16 FL DRAIN/INJECT LARGE JOINT/BURSA      EUFLEXXA INJECTION PER DOSE      sodium hyaluronate (SUPARTZ FX/HYALGAN/GENIVSC) 10 mg/mL syrg injection   2. Primary osteoarthritis of right knee M17.11 715.16 PROCEDURE AUTHORIZATION TO    3. Chronic pain of right knee M25.561 719.46 AMB POC XRAY, KNEE; 1/2 VIEWS    G89.29 338.29         PLAN:   1. Patient's left knee was injected with the third Euflexxa injection today. Patient's right knee pain is coming from XR documented degenerative changes. Will authorize [facility] [place] for right knee today  Risk factors include: htn  2. No ultrasound exam indicated today  3. No cortisone injection indicated today   4. No Physical/Occupational Therapy indicated today  5. No diagnostic test indicated today:   6. No durable medical equipment indicated today  7. No referral indicated today   8. Yes medications indicated today: euflexxa right knee  9.  No Narcotic indicated today     RTC following Euflexxa auth for the right knee  Follow-up Disposition: Not on File    Scribed by Alexandra Griffith [facility]) as dictated by JACKY [address] and Spine Specialist

## 2018-06-06 ENCOUNTER — DOCUMENTATION ONLY (OUTPATIENT)
Dept: ORTHOPEDIC SURGERY | Facility: CLINIC | Age: 63
End: 2018-06-06

## 2018-06-06 NOTE — PROGRESS NOTES
6/6/18 8:47am sp to Georgiana De Leon at [place] no authorization required for rt knee Euflexxa injections call reference#[id number]

## 2018-06-13 ENCOUNTER — OFFICE VISIT (OUTPATIENT)
Dept: ORTHOPEDIC SURGERY | Age: 63
End: 2018-06-13

## 2018-06-13 VITALS
BODY MASS INDEX: 24.11 KG/M2 | TEMPERATURE: 97.2 F | RESPIRATION RATE: 16 BRPM | HEIGHT: 62 IN | SYSTOLIC BLOOD PRESSURE: 113 MMHG | HEART RATE: 79 BPM | DIASTOLIC BLOOD PRESSURE: 75 MMHG | OXYGEN SATURATION: 100 % | WEIGHT: 131 LBS

## 2018-06-13 DIAGNOSIS — M17.11 PRIMARY OSTEOARTHRITIS OF RIGHT KNEE: Primary | ICD-10-CM

## 2018-06-13 RX ORDER — HYALURONATE SODIUM 10 MG/ML
2 SYRINGE (ML) INTRAARTICULAR ONCE
Qty: 2 ML | Refills: 0
Start: 2018-06-13 | End: 2018-06-13

## 2018-06-13 NOTE — PROGRESS NOTES
Patient: Pepe Thomas                MRN: [id number]       SSN: [id number]  YOB: 1955        AGE: 65 y.o. SEX: female  Body mass index is 23.96 kg/(m^2). PCP: Rosana Rutherford MD  06/13/18    Chief Complaint   Patient presents with    Knee Pain     right knee inj 1/3        HISTORY:  Pepe Thomas is a 65 y.o. female who is seen for Right knee and first Euflexxa injection. PROCEDURE:  Patient's Right knee, after timeout under sterile conditions, was injected with 2 cc of Euflexxa. [facility]  OFFICE PROCEDURE PROGRESS NOTE        Chart reviewed for the following:   Lashae CAMPOS PA-C, have reviewed the History, Physical and updated the Allergic reactions for [address] performed immediately prior to start of procedure:   Lashae CAMPOS PA-C, have performed the following reviews on Pepe Thomas prior to the start of the procedure:            * Patient was identified by name and date of birth   * Agreement on procedure being performed was verified  * Risks and Benefits explained to the patient  * Procedure site verified and marked as necessary  * Patient was positioned for comfort  * Consent was signed and verified     Time: 2:37 PM       Date of procedure: 6/13/2018    Procedure performed by:  Lashae Palmer PA-C    Provider assisted by: None     How tolerated by patient: tolerated the procedure well with no complications    Comments: none    IMPRESSION:     ICD-10-CM ICD-9-CM    1. Primary osteoarthritis of right knee M17.11 715.16 MO DRAIN/INJECT LARGE JOINT/BURSA      EUFLEXXA INJECTION PER DOSE      sodium hyaluronate (SUPARTZ FX/HYALGAN/GENIVSC) 10 mg/mL syrg injection        PLAN:  Ms. Maia De La Rosa will return in one week for her second Euflexxa injection.     Scribed by Joshua Mir ([address]) as dictated by JACKY Otero PA-C [address] and Spine Specialist

## 2018-06-20 ENCOUNTER — OFFICE VISIT (OUTPATIENT)
Dept: ORTHOPEDIC SURGERY | Age: 63
End: 2018-06-20

## 2018-06-20 VITALS
SYSTOLIC BLOOD PRESSURE: 115 MMHG | BODY MASS INDEX: 24.84 KG/M2 | TEMPERATURE: 96.5 F | OXYGEN SATURATION: 100 % | WEIGHT: 135 LBS | HEIGHT: 62 IN | RESPIRATION RATE: 16 BRPM | HEART RATE: 58 BPM | DIASTOLIC BLOOD PRESSURE: 70 MMHG

## 2018-06-20 DIAGNOSIS — M17.11 PRIMARY OSTEOARTHRITIS OF RIGHT KNEE: Primary | ICD-10-CM

## 2018-06-20 RX ORDER — HYALURONATE SODIUM 10 MG/ML
2 SYRINGE (ML) INTRAARTICULAR ONCE
Qty: 2 ML | Refills: 0
Start: 2018-06-20 | End: 2018-06-20

## 2018-06-20 NOTE — PROGRESS NOTES
Patient: Bruno Gandara                MRN: [id number]       SSN: [id number]  YOB: 1955        AGE: 65 y.o. SEX: female  Body mass index is 24.69 kg/(m^2). PCP: Mat Angulo MD  06/20/18    Chief Complaint   Patient presents with    Knee Pain     right knee inj 2/3       HISTORY:  Bruno Gandara is a 65 y.o. female who is seen for reevaluation of Right knee and here for 2nd injection of Euflexxa. PROCEDURE:  Under ultrasound guidance, patient's Right knee, after timeout under sterile conditions, was injected with 2 cc of Euflexxa. [facility]  OFFICE PROCEDURE PROGRESS NOTE        Chart reviewed for the following:   Jessa Michaud MD, have reviewed the History, Physical and updated the Allergic reactions for [address] performed immediately prior to start of procedure:   Jessa Michaud MD, have performed the following reviews on Bruno Gandara prior to the start of the procedure:            * Patient was identified by name and date of birth   * Agreement on procedure being performed was verified  * Risks and Benefits explained to the patient  * Procedure site verified and marked as necessary  * Patient was positioned for comfort  * Consent was signed and verified     Time: 10:30 AM    Date of procedure: 6/20/2018    Procedure performed by:  Fantasma Ocampo MD    Provider assisted by: None     How tolerated by patient: tolerated the procedure well with no complications    Comments: none    IMPRESSION:     ICD-10-CM ICD-9-CM    1. Primary osteoarthritis of right knee M17.11 715.16 EUFLEXXA INJECTION PER DOSE      sodium hyaluronate (SUPARTZ FX/HYALGAN/GENIVSC) 10 mg/mL syrg injection      US GUIDE INJ/ASP/ARTHRO LG JNT/BURSA        PLAN:  Ms. Neela Baker will return in one week for her third Euflexxa injection.       Scribed by Kaylyn David ([address]) as dictated by Fantasma Ocampo MD CAMPOS, Dr. Miles Sierra, confirm that all documentation is accurate.     Miles Sierra M.D.   [facility] and Spine Specialist

## 2018-06-27 ENCOUNTER — OFFICE VISIT (OUTPATIENT)
Dept: ORTHOPEDIC SURGERY | Age: 63
End: 2018-06-27

## 2018-06-27 VITALS
OXYGEN SATURATION: 100 % | SYSTOLIC BLOOD PRESSURE: 123 MMHG | BODY MASS INDEX: 24.84 KG/M2 | DIASTOLIC BLOOD PRESSURE: 82 MMHG | RESPIRATION RATE: 16 BRPM | TEMPERATURE: 97.6 F | HEART RATE: 95 BPM | WEIGHT: 135 LBS | HEIGHT: 62 IN

## 2018-06-27 DIAGNOSIS — M17.11 PRIMARY OSTEOARTHRITIS OF RIGHT KNEE: Primary | ICD-10-CM

## 2018-06-27 RX ORDER — HYALURONATE SODIUM 10 MG/ML
2 SYRINGE (ML) INTRAARTICULAR ONCE
Qty: 2 ML | Refills: 0
Start: 2018-06-27 | End: 2018-06-27

## 2018-06-27 NOTE — PROGRESS NOTES
Patient: Florina Marie                MRN: [id number]       SSN: [id number]  YOB: 1955        AGE: 59 y.o. SEX: female  Body mass index is 24.69 kg/(m^2). PCP: Srinivasa Zaragoza MD  06/27/18    Chief Complaint   Patient presents with    Injection     Right knee euflexxa injection #3       HISTORY:  Florina Marie is a 59 y.o. female who is seen for Right knee and third Euflexxa injection. PROCEDURE:  Patient's Right knee, after timeout under sterile conditions, was injected with 2 cc of Euflexxa. [facility]  OFFICE PROCEDURE PROGRESS NOTE        Chart reviewed for the following:   Otilio CAMPOS PA-C, have reviewed the History, Physical and updated the Allergic reactions for [address] performed immediately prior to start of procedure:   Otilio CAMPOS PA-C, have performed the following reviews on Florina Marie prior to the start of the procedure:            * Patient was identified by name and date of birth   * Agreement on procedure being performed was verified  * Risks and Benefits explained to the patient  * Procedure site verified and marked as necessary  * Patient was positioned for comfort  * Consent was signed and verified     Time: 11:28 AM       Date of procedure: 6/27/2018    Procedure performed by:  Otilio Costa PA-C    Provider assisted by: None     How tolerated by patient: tolerated the procedure well with no complications    Comments: none    IMPRESSION:     ICD-10-CM ICD-9-CM    1. Primary osteoarthritis of right knee M17.11 715.16         PLAN: Ms. Rimma Feng has completed her Euflexxa injection series. she will return as needed.     Scribed by Morgan Tavera ([facility]) as dictated by JACKY Gunn PA-C  [address] and Spine Specialist

## 2018-08-03 DIAGNOSIS — I10 ESSENTIAL HYPERTENSION, BENIGN: Primary | ICD-10-CM

## 2018-08-03 DIAGNOSIS — E03.9 ACQUIRED HYPOTHYROIDISM: ICD-10-CM

## 2018-08-07 ENCOUNTER — OFFICE VISIT (OUTPATIENT)
Dept: INTERNAL MEDICINE CLINIC | Age: 63
End: 2018-08-07

## 2018-08-07 VITALS
SYSTOLIC BLOOD PRESSURE: 120 MMHG | BODY MASS INDEX: 22.82 KG/M2 | OXYGEN SATURATION: 97 % | WEIGHT: 124 LBS | HEIGHT: 62 IN | TEMPERATURE: 99.4 F | RESPIRATION RATE: 16 BRPM | HEART RATE: 87 BPM | DIASTOLIC BLOOD PRESSURE: 80 MMHG

## 2018-08-07 DIAGNOSIS — E03.9 ACQUIRED HYPOTHYROIDISM: ICD-10-CM

## 2018-08-07 DIAGNOSIS — I10 ESSENTIAL HYPERTENSION, BENIGN: ICD-10-CM

## 2018-08-07 DIAGNOSIS — Z00.00 ROUTINE GENERAL MEDICAL EXAMINATION AT A HEALTH CARE FACILITY: Primary | ICD-10-CM

## 2018-08-07 RX ORDER — DULOXETIN HYDROCHLORIDE 30 MG/1
30 CAPSULE, DELAYED RELEASE ORAL DAILY
COMMUNITY

## 2018-08-07 NOTE — PROGRESS NOTES
1. Have you been to the ER, urgent care clinic since your last visit? Hospitalized since your last visit? No    2. Have you seen or consulted any other health care providers outside of the [address] [place] [address] since your last visit? Include any pap smears or colon screening.  No

## 2018-08-07 NOTE — PATIENT INSTRUCTIONS
Hypothyroidism: Care Instructions  Your Care Instructions    You have hypothyroidism, which means that your body is not making enough thyroid hormone. This hormone helps your body use energy. If your thyroid level is low, you may feel tired, be constipated, have an increase in your blood pressure, or have dry skin or memory problems. You may also get cold easily, even when it is warm. Women with low thyroid levels may have heavy menstrual periods. A blood test to find your thyroid-stimulating hormone (TSH) level is used to check for hypothyroidism. A high TSH level may mean that you have low thyroid. When your body is not making enough thyroid hormone, TSH levels rise in an effort to make the body produce more. The treatment for hypothyroidism is to take thyroid hormone pills. You should start to feel better in 1 to 2 weeks. But it can take several months to see changes in the TSH level. You will need regular visits with your doctor to make sure you have the right dose of medicine. Most people need treatment for the rest of their lives. You will need to see your doctor regularly to have blood tests and to make sure you are doing well. Follow-up care is a key part of your treatment and safety. Be sure to make and go to all appointments, and call your doctor if you are having problems. It's also a good idea to know your test results and keep a list of the medicines you take. How can you care for yourself at home? · Take your thyroid hormone medicine exactly as prescribed. Call your doctor if you think you are having a problem with your medicine. Most people do not have side effects if they take the right amount of medicine regularly. ¨ Take the medicine 30 minutes before breakfast, and do not take it with calcium, vitamins, or iron. ¨ Do not take extra doses of your thyroid medicine. It will not help you get better any faster, and it may cause side effects.   ¨ If you forget to take a dose, do NOT take a double dose of medicine. Take your usual dose the next day. · Tell your doctor about all prescription, herbal, or over-the-counter products you take. · Take care of yourself. Eat a healthy diet, get enough sleep, and get regular exercise. When should you call for help? Call 911 anytime you think you may need emergency care. For example, call if:    · You passed out (lost consciousness).     · You have severe trouble breathing.     · You have a very slow heartbeat (less than 60 beats a minute).     · You have a low body temperature (95°F or below).    Call your doctor now or seek immediate medical care if:    · You feel tired, sluggish, or weak.     · You have trouble remembering things or concentrating.     · You do not begin to feel better 2 weeks after starting your medicine.    Watch closely for changes in your health, and be sure to contact your doctor if you have any problems. Where can you learn more? Go to http://lori-cherelle.info/. Enter Q960 in the search box to learn more about \"Hypothyroidism: Care Instructions. \"  Current as of: May 12, 2017  Content Version: 11.7  © 5766-7506 [employer]. Care instructions adapted under license by [employer] (which disclaims liability or warranty for this information). If you have questions about a medical condition or this instruction, always ask your healthcare professional. [address] any warranty or liability for your use of this information.

## 2018-08-07 NOTE — PROGRESS NOTES
HPI:   Routine f/u of HTN/hypothyroidism (TSH 1.82 last year)  No acute issues  Pemphigus managed by derm; anxiety/depression per psychiatry  w/o chest pain/abd. discomfort; no dyspnea, cough or pedal edema; denies constitutional complaints of fever, night sweats or wt loss; no evidence of GI/ hemorrhage; no polyuria/polydipsia. Activity is age appropriate. ROS is otherwise negative. Past Medical History:   Diagnosis Date    Arthritis     Back pain     Bursitis of right shoulder     subacromial    Degenerative arthritis of right knee     Depression     DVT of leg (deep venous thrombosis) (HCC) 7/2015    (L) ankle fx    GERD (gastroesophageal reflux disease)     Herniated disc     Herpes simplex     genital    Hypertension     IBS (irritable bowel syndrome)     Lower extremity pain, bilateral     Migraine headache     Pemphigus vulgaris     Right knee pain     Right shoulder pain     Rosacea     Thyroid disease     Venous (peripheral) insufficiency     Wears glasses        Past Surgical History:   Procedure Laterality Date    HX APPENDECTOMY      HX COLONOSCOPY  9/2011; 12/23/16; 3/21/17    neg    HX MENISCUS REPAIR      HX SEPTOPLASTY      HX VEIN STRIPPING         Social History     Social History    Marital status:      Spouse name: N/A    Number of children: N/A    Years of education: N/A     Occupational History    Not on file.      Social History Main Topics    Smoking status: Never Smoker    Smokeless tobacco: Never Used    Alcohol use 0.0 oz/week     0 Standard drinks or equivalent per week      Comment: One glass of wine per month    Drug use: No    Sexual activity: Not on file     Other Topics Concern    Not on file     Social History Narrative       Allergies   Allergen Reactions    Sulfa (Sulfonamide Antibiotics) Hives       Family History   Problem Relation Age of Onset    Cancer Mother     Heart Disease Father     Hypertension Father     Cancer Maternal Aunt      breast cancer       Current Outpatient Prescriptions   Medication Sig Dispense Refill    DULoxetine (CYMBALTA) 30 mg capsule Take 30 mg by mouth daily.  levothyroxine (SYNTHROID) 150 mcg tablet TAKE 1 TABLET BY MOUTH  EVERY DAY 90 Tab 3    amLODIPine (NORVASC) 10 mg tablet TAKE 1 TABLET BY MOUTH  DAILY 90 Tab 3    lidocaine (LIDODERM) 5 % Apply 1 patch to the affected area for 12 hours a day and remove for 12 hours a day. 90 Each 0    MYRBETRIQ 25 mg ER tablet take 1 tablet by mouth once daily if needed  0    polyethylene glycol (MIRALAX) 17 gram/dose powder   1    doxycycline (VIBRA-TABS) 100 mg tablet 100 mg two (2) times a day.  alendronate (FOSAMAX) 70 mg tablet Take 70 mg by mouth every seven (7) days.  valACYclovir (VALTREX) 1 gram tablet Take 1 Tab by mouth daily. 90 Tab 3    predniSONE (DELTASONE) 5 mg tablet Take 5 mg by mouth every other day.  MYCOPHENOLATE MOFETIL (CELLCEPT PO) Take 1 g by mouth two (2) times a day.  buPROPion XL (WELLBUTRIN XL) 300 mg XL tablet Take 400 mg by mouth every morning.  topiramate (TOPAMAX) 100 mg tablet Take  by mouth daily.  RIZATRIPTAN BENZOATE (MAXALT PO) Take  by mouth daily as needed.  zolpidem CR (AMBIEN CR) 12.5 mg tablet Take 12.5 mg by mouth nightly as needed for Sleep.  DIAZEPAM (VALIUM PO) Take 5 mg by mouth three (3) times daily as needed.  cycloSPORINE (RESTASIS) 0.05 % ophthalmic emulsion Administer 1 Drop to both eyes two (2) times a day. Visit Vitals    /80 (BP 1 Location: Left arm, BP Patient Position: Sitting)    Pulse 87    Temp 99.4 °F (37.4 °C) (Tympanic)    Resp 16    Ht 5' 2\" (1.575 m)    Wt 124 lb (56.2 kg)    SpO2 97%    BMI 22.68 kg/m2       PE  Well nourished in NAD  HEENT:   OP: clear. Neck: supple w/o mass or bruits. Chest: clear. CV: RRR w/o m,r,g; pulses intact. Abd: soft, NT, w/o HSM or mass. Ext: w/o edema. Neuro: NF.     Assessment and Plan    Encounter Diagnoses   Name Primary?     Routine general medical examination at a health care facility Yes    Acquired hypothyroidism     Essential hypertension, benign    BP @ goal  Labs to assess metabolic parameters (hyperlipidemia)  Continue dietary/exercise efforts  [place] neg 3/2017  Mammogram neg 11/2017  No change in rx  OV 6-12 mos or prn  I have explained plan to patient and the patient verbalizes understanding

## 2018-08-21 ENCOUNTER — HOSPITAL ENCOUNTER (OUTPATIENT)
Dept: LAB | Age: 63
Discharge: HOME | End: 2018-08-21
Payer: COMMERCIAL

## 2018-08-21 DIAGNOSIS — E03.9 ACQUIRED HYPOTHYROIDISM: ICD-10-CM

## 2018-08-21 LAB
ALBUMIN SERPL-MCNC: 4 G/DL (ref 3.4–5)
ALBUMIN/GLOB SERPL: 1.5 {RATIO} (ref 0.8–1.7)
ALP SERPL-CCNC: 36 U/L (ref 45–117)
ALT SERPL-CCNC: 14 U/L (ref 13–56)
ANION GAP SERPL CALC-SCNC: 7 MMOL/L (ref 3–18)
AST SERPL-CCNC: 16 U/L (ref 15–37)
BASOPHILS # BLD: 0 K/UL (ref 0–0.1)
BASOPHILS NFR BLD: 1 % (ref 0–2)
BILIRUB SERPL-MCNC: 0.3 MG/DL (ref 0.2–1)
BUN SERPL-MCNC: 20 MG/DL (ref 7–18)
BUN/CREAT SERPL: 21 (ref 12–20)
CALCIUM SERPL-MCNC: 8.7 MG/DL (ref 8.5–10.1)
CHLORIDE SERPL-SCNC: 111 MMOL/L (ref 100–108)
CHOLEST SERPL-MCNC: 217 MG/DL
CO2 SERPL-SCNC: 26 MMOL/L (ref 21–32)
CREAT SERPL-MCNC: 0.97 MG/DL (ref 0.6–1.3)
DIFFERENTIAL METHOD BLD: ABNORMAL
EOSINOPHIL # BLD: 0.3 K/UL (ref 0–0.4)
EOSINOPHIL NFR BLD: 5 % (ref 0–5)
ERYTHROCYTE [DISTWIDTH] IN BLOOD BY AUTOMATED COUNT: 13.3 % (ref 11.6–14.5)
GLOBULIN SER CALC-MCNC: 2.6 G/DL (ref 2–4)
GLUCOSE SERPL-MCNC: 85 MG/DL (ref 74–99)
HCT VFR BLD AUTO: 42.2 % (ref 35–45)
HDLC SERPL-MCNC: 88 MG/DL (ref 40–60)
HDLC SERPL: 2.5 {RATIO} (ref 0–5)
HGB BLD-MCNC: 13.8 G/DL (ref 12–16)
LDLC SERPL CALC-MCNC: 113 MG/DL (ref 0–100)
LIPID PROFILE,FLP: ABNORMAL
LYMPHOCYTES # BLD: 2.1 K/UL (ref 0.9–3.6)
LYMPHOCYTES NFR BLD: 37 % (ref 21–52)
MCH RBC QN AUTO: 32.6 PG (ref 24–34)
MCHC RBC AUTO-ENTMCNC: 32.7 G/DL (ref 31–37)
MCV RBC AUTO: 99.8 FL (ref 74–97)
MONOCYTES # BLD: 0.6 K/UL (ref 0.05–1.2)
MONOCYTES NFR BLD: 11 % (ref 3–10)
NEUTS SEG # BLD: 2.7 K/UL (ref 1.8–8)
NEUTS SEG NFR BLD: 46 % (ref 40–73)
PLATELET # BLD AUTO: 246 K/UL (ref 135–420)
PMV BLD AUTO: 10.8 FL (ref 9.2–11.8)
POTASSIUM SERPL-SCNC: 3.7 MMOL/L (ref 3.5–5.5)
PROT SERPL-MCNC: 6.6 G/DL (ref 6.4–8.2)
RBC # BLD AUTO: 4.23 M/UL (ref 4.2–5.3)
SODIUM SERPL-SCNC: 144 MMOL/L (ref 136–145)
TRIGL SERPL-MCNC: 80 MG/DL (ref ?–150)
TSH SERPL DL<=0.05 MIU/L-ACNC: 0.38 UIU/ML (ref 0.36–3.74)
VLDLC SERPL CALC-MCNC: 16 MG/DL
WBC # BLD AUTO: 5.8 K/UL (ref 4.6–13.2)

## 2018-08-21 PROCEDURE — 80053 COMPREHEN METABOLIC PANEL: CPT | Performed by: INTERNAL MEDICINE

## 2018-08-21 PROCEDURE — 36415 COLL VENOUS BLD VENIPUNCTURE: CPT | Performed by: INTERNAL MEDICINE

## 2018-08-21 PROCEDURE — 85025 COMPLETE CBC W/AUTO DIFF WBC: CPT | Performed by: INTERNAL MEDICINE

## 2018-08-21 PROCEDURE — 80061 LIPID PANEL: CPT | Performed by: INTERNAL MEDICINE

## 2018-08-21 PROCEDURE — 84443 ASSAY THYROID STIM HORMONE: CPT | Performed by: INTERNAL MEDICINE

## 2018-09-21 RX ORDER — VALACYCLOVIR HYDROCHLORIDE 1 G/1
TABLET, FILM COATED ORAL
Qty: 90 TAB | Refills: 2 | Status: SHIPPED | OUTPATIENT
Start: 2018-09-21 | End: 2019-02-19 | Stop reason: SDUPTHER

## 2019-02-08 ENCOUNTER — HOSPITAL ENCOUNTER (OUTPATIENT)
Dept: MAMMOGRAPHY | Age: 64
Discharge: HOME | End: 2019-02-08
Attending: ADVANCED PRACTICE MIDWIFE
Payer: COMMERCIAL

## 2019-02-08 DIAGNOSIS — Z12.31 VISIT FOR SCREENING MAMMOGRAM: ICD-10-CM

## 2019-02-08 PROCEDURE — 77063 BREAST TOMOSYNTHESIS BI: CPT

## 2019-02-19 RX ORDER — VALACYCLOVIR HYDROCHLORIDE 1 G/1
TABLET, FILM COATED ORAL
Qty: 90 TAB | Refills: 2 | Status: SHIPPED | OUTPATIENT
Start: 2019-02-19

## 2019-08-13 RX ORDER — LEVOTHYROXINE SODIUM 150 UG/1
TABLET ORAL
Qty: 90 TAB | Refills: 3 | OUTPATIENT
Start: 2019-08-13

## 2019-08-13 NOTE — TELEPHONE ENCOUNTER
Called patient to notify her she needs an office appointment prior to any more refills.  Unable to leave message voice mail full

## 2019-10-01 ENCOUNTER — OFFICE VISIT (OUTPATIENT)
Dept: ORTHOPEDIC SURGERY | Age: 64
End: 2019-10-01

## 2019-10-01 VITALS
RESPIRATION RATE: 14 BRPM | HEIGHT: 62 IN | HEART RATE: 89 BPM | SYSTOLIC BLOOD PRESSURE: 113 MMHG | DIASTOLIC BLOOD PRESSURE: 70 MMHG | BODY MASS INDEX: 21.86 KG/M2 | WEIGHT: 118.8 LBS | TEMPERATURE: 97.7 F

## 2019-10-01 DIAGNOSIS — M17.12 PRIMARY OSTEOARTHRITIS OF LEFT KNEE: ICD-10-CM

## 2019-10-01 DIAGNOSIS — M25.562 LEFT KNEE PAIN, UNSPECIFIED CHRONICITY: ICD-10-CM

## 2019-10-01 DIAGNOSIS — S83.8X2A MENISCAL INJURY, LEFT, INITIAL ENCOUNTER: Primary | ICD-10-CM

## 2019-10-01 NOTE — PATIENT INSTRUCTIONS
Meniscus Tear: Care Instructions Your Care Instructions The meniscus is rubbery tissue in the knee that acts as a shock absorber between the upper and lower leg bones. The meniscus also keeps your knee stable by spreading weight across it. Each knee has two menisci (plural of meniscus). You can tear a meniscus if you plant your foot and twist, or pivot. The meniscus also can wear down as you age, and it can tear from squatting or kneeling. Small tears may heal on their own with rest and some physical therapy. But a more serious tear may need surgery to repair it or to remove part of the meniscus. Your doctor may want you to see a doctor who specializes in bones and sports injuries. Follow-up care is a key part of your treatment and safety. Be sure to make and go to all appointments, and call your doctor if you are having problems. It's also a good idea to know your test results and keep a list of the medicines you take. How can you care for yourself at home? · Rest your knee when possible. · Do not squat or kneel. · Take pain medicines exactly as directed. ? If the doctor gave you a prescription medicine for pain, take it as prescribed. ? If you are not taking a prescription pain medicine, ask your doctor if you can take an over-the-counter medicine. · Put ice or a cold pack on your knee for 10 to 20 minutes at a time. Try to do this every 1 to 2 hours for the next 3 days (when you are awake) or until the swelling goes down. Put a thin cloth between the ice and your skin. · Prop up the sore leg on a pillow when you ice your knee or any time you sit or lie down during the next 3 days. Try to keep your leg above the level of your heart. This will help reduce swelling. · Follow your doctor's directions for using crutches or a knee brace, if suggested. · Follow your doctor's directions for exercises to keep your knee mobile and your leg muscles strong.  Here are a few exercises you can try if your doctor says it is okay. ? Quad sets: Lie down on the floor or the bed with your injured leg straight. Fully extend your legthere should be no or little bend in your knee. Tighten the thigh (quadriceps) of your injured leg for 6 seconds. Do not lift your heel up. Relax your quadriceps for 10 seconds. Repeat this exercise 8 to 12 times several times during the day. ? Straight-leg raises: Lie down on the floor or the bed with your injured leg flat and your uninjured leg bent so that the bottom of your foot is on the floor or bed. Tighten the quadriceps of your injured leg. Keeping your knee as straight as possible, lift your injured leg off the bed until it is about 18 inches above the bed or floor. Lower your leg back down and relax for 5 seconds. Do 3 sets of 20 repetitions, or if you tire quickly, 3 sets of 8 to 12 repetitions. ? Heel raises: Stand with your feet a few inches apart. Rest your hands lightly on a counter or chair in front of you. Slowly raise your heels off the floor while keeping your knees straight. Hold for 3 seconds, then slowly lower your heels to the floor. Do 3 sets of 8 to 12 repetitions. ? Heel slides: Lie down on the floor or the bed with your leg flat. Slowly begin to slide your heel toward your rear end (buttocks), keeping your heel on the floor. Your knee will begin to bend. Slide your heel and bend your knee until it becomes a little sore and you can feel a small amount of pressure inside your knee. Hold this position for 10 seconds. Slide your heel back down until your leg is straight on the floor. Relax for 10 seconds. Repeat this exercise 20 times. When should you call for help? Watch closely for changes in your health, and be sure to contact your doctor if: 
  · You have increasing knee pain or swelling or both.  
  · Your knee is so sore or stiff that you cannot walk on it.  
  · You do not get better as expected. Where can you learn more? Go to http://lori-cherelle.info/. Enter G872 in the search box to learn more about \"Meniscus Tear: Care Instructions. \" Current as of: June 26, 2019 Content Version: 12.2 © 1129-5572 [employer]. Care instructions adapted under license by [employer] (which disclaims liability or warranty for this information). If you have questions about a medical condition or this instruction, always ask your healthcare professional. [address] any warranty or liability for your use of this information.

## 2019-10-01 NOTE — PROGRESS NOTES
Dorothy Winters  1955   Chief Complaint   Patient presents with    Knee Pain     left knee        HISTORY OF PRESENT ILLNESS  Dorothy Winters is a 62 y.o. female who presents today for reevaluation of left knee pain. Patient rates pain as 0/10 today. Pt completed the Euflexxa series for her right knee on 6/27/18 and her left knee on 5/09/18. She states the injections provided relief for about 3 months. Pt had meniscus surgery in 2012. Pain with walking that moves down to her shin. Patient denies any fever, chills, chest pain, shortness of breath or calf pain. There are no new illness or injuries to report since last seen in the office. There are no changes to medications, allergies, family or social history. PHYSICAL EXAM:   Visit Vitals  /70   Pulse 89   Temp 97.7 °F (36.5 °C) (Oral)   Resp 14   Ht 5' 2\" (1.575 m)   Wt 118 lb 12.8 oz (53.9 kg)   BMI 21.73 kg/m²     The patient is a well-developed, well-nourished female   in no acute distress. The patient is alert and oriented times three. The patient is alert and oriented times three. Mood and affect are normal.  LYMPHATIC: lymph nodes are not enlarged and are within normal limits  SKIN: normal in color and non tender to palpation. There are no bruises or abrasions noted. NEUROLOGICAL: Motor sensory exam is within normal limits. Reflexes are equal bilaterally.  There is normal sensation to pinprick and light touch  MUSCULOSKELETAL:  Examination Left knee   Skin Intact   Range of motion 0-130   Effusion +   Medial joint line tenderness +   Lateral joint line tenderness -   Tenderness Pes Bursa -   Tenderness insertion MCL -   Tenderness insertion LCL -   Zoilas -   Patella crepitus +   Patella grind -   Lachman -   Pivot shift -   Anterior drawer -   Posterior drawer -   Varus stress -   Valgus stress -   Neurovascular Intact   Calf Swelling and Tenderness to Palpation -   Mulu's Test -   Hamstring Cord Tightness -     Examination Right knee   Skin Intact   Range of motion 0-120   Effusion -   Medial joint line tenderness +   Lateral joint line tenderness -   Tenderness Pes Bursa -   Tenderness insertion MCL -   Tenderness insertion LCL -   Zoilas -   Patella crepitus +   Patella grind -   Lachman -   Pivot shift -   Anterior drawer -   Posterior drawer -   Varus stress -   Valgus stress -   Neurovascular Intact   Calf Swelling and Tenderness to Palpation -   Mulu's Test -   Hamstring Cord Tightness -       IMAGING: XR of left knee dated 10/01/19 was reviewed and read: Decreased joint space on medial side with mild patellofemoral joint changes. XR of the right knee dated 5/9/18 was reviewed and read: decreased joint space in medial compartment with degenerative changes in medial and patella femoral joint    XR of the left knee dated 4/4/18 was reviewed and read: degenerative arthritis in the patellofemoral joint, mild joint space narrowing in medial compartment    IMPRESSION:      ICD-10-CM ICD-9-CM    1. Meniscal injury, left, initial encounter S83.8X2A 959.7 MRI KNEE LT WO CONT   2. Primary osteoarthritis of left knee M17.12 715.16 MRI KNEE LT WO CONT   3. Left knee pain, unspecified chronicity M25.562 719.46 AMB POC XRAY, KNEE; 1/2 VIEWS        PLAN:   1. Patient presents today with left knee pain and I would like to get an MRI to r/o a meniscus tear. Risk factors include: htn  2. No ultrasound exam indicated today  3. No cortisone injection indicated today   4. No Physical/Occupational Therapy indicated today  5. Yes diagnostic test indicated today: MRI L KNEE  6. No durable medical equipment indicated today  7. No referral indicated today   8. No medications indicated today:  9. No Narcotic indicated today     RTC following MRI      Scribed by Cindy Yun as dictated by Anson Rivero MD    I, Dr. Anson Rivero, confirm that all documentation is accurate.     Anson Rivero M.D.   [facility]

## 2019-10-01 NOTE — PROGRESS NOTES
1. Have you been to the ER, urgent care clinic since your last visit? Hospitalized since your last visit? No    2. Have you seen or consulted any other health care providers outside of the [address] [place] [address] since your last visit? Include any pap smears or colon screening.  No

## 2019-10-09 ENCOUNTER — HOSPITAL ENCOUNTER (OUTPATIENT)
Age: 64
Discharge: HOME | End: 2019-10-09
Attending: ORTHOPAEDIC SURGERY
Payer: COMMERCIAL

## 2019-10-09 DIAGNOSIS — M17.12 PRIMARY OSTEOARTHRITIS OF LEFT KNEE: ICD-10-CM

## 2019-10-09 DIAGNOSIS — S83.8X2A MENISCAL INJURY, LEFT, INITIAL ENCOUNTER: ICD-10-CM

## 2019-10-09 PROCEDURE — 73721 MRI JNT OF LWR EXTRE W/O DYE: CPT

## 2020-07-08 ENCOUNTER — OFFICE VISIT (OUTPATIENT)
Dept: ORTHOPEDIC SURGERY | Age: 65
End: 2020-07-08

## 2020-07-08 VITALS
OXYGEN SATURATION: 100 % | WEIGHT: 115 LBS | HEART RATE: 90 BPM | HEIGHT: 62 IN | TEMPERATURE: 98.9 F | BODY MASS INDEX: 21.16 KG/M2 | DIASTOLIC BLOOD PRESSURE: 81 MMHG | SYSTOLIC BLOOD PRESSURE: 129 MMHG

## 2020-07-08 DIAGNOSIS — S83.242A TEAR OF MEDIAL MENISCUS OF LEFT KNEE, CURRENT, UNSPECIFIED TEAR TYPE, INITIAL ENCOUNTER: Primary | ICD-10-CM

## 2020-07-08 RX ORDER — ERENUMAB-AOOE 140 MG/ML
INJECTION, SOLUTION SUBCUTANEOUS
COMMUNITY
Start: 2020-06-17

## 2020-07-08 NOTE — PROGRESS NOTES
Isaias Adams  1955   Chief Complaint   Patient presents with    Knee Pain     LEFT        HISTORY OF PRESENT ILLNESS  Isaias Adams is a 60 y.o. female who presents today for reevaluation of left knee pain and MRI review. Patient rates pain as 2/10 today. Pain has been present for awhile, patient was last seen here in October 2019. Pt completed the Euflexxa series for her right knee on 6/27/18 and her left knee on 5/09/18. She states the injections provided relief for about 3 months. Pt had meniscus surgery in 2012. Pain with walking that moves down to her shin. Pain at night. Pt is now retired. She also complains of right shoulder pain today. She states she has been unable to hold a plank position while doing yoga. Patient denies any fever, chills, chest pain, shortness of breath or calf pain. There are no new illness or injuries to report since last seen in the office. There are no changes to medications, allergies, family or social history. Pain Assessment  7/8/2020   Location of Pain Knee   Pain Location Comment -   Location Modifiers Left   Severity of Pain 2   Quality of Pain Sharp; Aching   Quality of Pain Comment -   Duration of Pain -   Frequency of Pain Constant   Aggravating Factors -   Aggravating Factors Comment -   Limiting Behavior Yes   Relieving Factors Ice   Relieving Factors Comment -   Result of Injury No   Type of Injury -       PHYSICAL EXAM:   Visit Vitals  /81   Pulse 90   Temp 98.9 °F (37.2 °C) (Temporal)   Ht 5' 2\" (1.575 m)   Wt 115 lb (52.2 kg)   SpO2 100%   BMI 21.03 kg/m²     The patient is a well-developed, well-nourished female   in no acute distress. The patient is alert and oriented times three. The patient is alert and oriented times three. Mood and affect are normal.  LYMPHATIC: lymph nodes are not enlarged and are within normal limits  SKIN: normal in color and non tender to palpation. There are no bruises or abrasions noted.    NEUROLOGICAL: Motor sensory exam is within normal limits. Reflexes are equal bilaterally. There is normal sensation to pinprick and light touch  MUSCULOSKELETAL:  Examination Left knee   Skin Intact   Range of motion 0-130   Effusion +   Medial joint line tenderness +   Lateral joint line tenderness -   Tenderness Pes Bursa -   Tenderness insertion MCL -   Tenderness insertion LCL -   Zoilas -   Patella crepitus +   Patella grind -   Lachman -   Pivot shift -   Anterior drawer -   Posterior drawer -   Varus stress -   Valgus stress -   Neurovascular Intact   Calf Swelling and Tenderness to Palpation -   Mulu's Test -   Hamstring Cord Tightness -       IMAGING: MRI of left knee dated 10/09/2019 was reviewed and read by Dr. Don Tompkins:   IMPRESSION:  1. Combination of postsurgical changes in the medial meniscus and likely recurrent radial tear and perhaps flap tear in the posterior horn. Suspected associated meniscal cysts medially. Mild MCL sprain with possible partial-thickness tear/perforation. 2. Small joint effusion. Possible previous rupture of popliteal cyst.      XR of left knee dated 10/01/19 was reviewed and read: Decreased joint space on medial side with mild patellofemoral joint changes. XR of the right knee dated 5/9/18 was reviewed and read: decreased joint space in medial compartment with degenerative changes in medial and patella femoral joint    XR of the left knee dated 4/4/18 was reviewed and read: degenerative arthritis in the patellofemoral joint, mild joint space narrowing in medial compartment    IMPRESSION:      ICD-10-CM ICD-9-CM    1. Tear of medial meniscus of left knee, current, unspecified tear type, initial encounter S83.242A 836.0         PLAN:   1. I discussed the risks and benefits and potential adverse outcomes of both operative vs non operative treatment of left knee medial meniscus tear with the patient and patient wishes to proceed with arthroscopic left partial medial meniscectomy. Risks of operative intervention include but not limited to bleeding, infection, deep vein thrombosis, pulmonary embolism, death, limb length discrepancy, reflexive sympathetic dystrophy, fat embolism syndrome,damage to blood vessels and nerves, malunion, non-union, delayed union, failure of hardware, post traumatic arthritis, stroke, heart attack, and death. Patient understands that infection may arise and may require numerous surgeries. The patient was counseled at length about the risks of clifton Covid-19 during their perioperative period and any recovery window from their procedure. The patient was made aware that clifton Covid-19  may worsen their prognosis for recovering from their procedure and lend to a higher morbidity and/or mortality risk. All material risks, benefits, and reasonable alternatives including postponing the procedure were discussed. The patient does  wish to proceed with the procedure at this time. History and physical exam to be preformed at a later date. Risk factors include: htn, hx of DVT  2. No ultrasound exam indicated today  3. No cortisone injection indicated today   4. No Physical/Occupational Therapy indicated today  5. No diagnostic test indicated today  6. No durable medical equipment indicated today  7. No referral indicated today   8. No medications indicated today:  9. No Narcotic indicated today     RTC H&P      Scribed by Brent Jama [address]) as dictated by Choco Linn MD    I, Dr. Choco Linn, confirm that all documentation is accurate.     Choco Linn M.D.   [facility]

## 2020-08-03 ENCOUNTER — HOSPITAL ENCOUNTER (OUTPATIENT)
Dept: LAB | Age: 65
Discharge: HOME | End: 2020-08-03
Payer: COMMERCIAL

## 2020-08-03 DIAGNOSIS — Z01.818 PREOP EXAMINATION: ICD-10-CM

## 2020-08-03 DIAGNOSIS — Z01.818 PREOP EXAMINATION: Primary | ICD-10-CM

## 2020-08-03 LAB
ANION GAP SERPL CALC-SCNC: 4 MMOL/L (ref 3–18)
ATRIAL RATE: 72 BPM
BASOPHILS # BLD: 0 K/UL (ref 0–0.1)
BASOPHILS NFR BLD: 0 % (ref 0–2)
BUN SERPL-MCNC: 16 MG/DL (ref 7–18)
BUN/CREAT SERPL: 14 (ref 12–20)
CALCIUM SERPL-MCNC: 9.3 MG/DL (ref 8.5–10.1)
CALCULATED P AXIS, ECG09: 54 DEGREES
CALCULATED R AXIS, ECG10: 40 DEGREES
CALCULATED T AXIS, ECG11: 52 DEGREES
CHLORIDE SERPL-SCNC: 110 MMOL/L (ref 100–111)
CO2 SERPL-SCNC: 27 MMOL/L (ref 21–32)
CREAT SERPL-MCNC: 1.11 MG/DL (ref 0.6–1.3)
DIAGNOSIS, 93000: NORMAL
DIFFERENTIAL METHOD BLD: ABNORMAL
EOSINOPHIL # BLD: 0.1 K/UL (ref 0–0.4)
EOSINOPHIL NFR BLD: 1 % (ref 0–5)
ERYTHROCYTE [DISTWIDTH] IN BLOOD BY AUTOMATED COUNT: 13 % (ref 11.6–14.5)
GLUCOSE SERPL-MCNC: 80 MG/DL (ref 74–99)
HCT VFR BLD AUTO: 42.1 % (ref 35–45)
HGB BLD-MCNC: 13.8 G/DL (ref 12–16)
LYMPHOCYTES # BLD: 0.6 K/UL (ref 0.9–3.6)
LYMPHOCYTES NFR BLD: 7 % (ref 21–52)
MCH RBC QN AUTO: 32.7 PG (ref 24–34)
MCHC RBC AUTO-ENTMCNC: 32.8 G/DL (ref 31–37)
MCV RBC AUTO: 99.8 FL (ref 74–97)
MONOCYTES # BLD: 0.3 K/UL (ref 0.05–1.2)
MONOCYTES NFR BLD: 3 % (ref 3–10)
NEUTS SEG # BLD: 7.3 K/UL (ref 1.8–8)
NEUTS SEG NFR BLD: 89 % (ref 40–73)
P-R INTERVAL, ECG05: 166 MS
PLATELET # BLD AUTO: 266 K/UL (ref 135–420)
PMV BLD AUTO: 10.2 FL (ref 9.2–11.8)
POTASSIUM SERPL-SCNC: 4.1 MMOL/L (ref 3.5–5.5)
Q-T INTERVAL, ECG07: 360 MS
QRS DURATION, ECG06: 72 MS
QTC CALCULATION (BEZET), ECG08: 394 MS
RBC # BLD AUTO: 4.22 M/UL (ref 4.2–5.3)
SODIUM SERPL-SCNC: 141 MMOL/L (ref 136–145)
VENTRICULAR RATE, ECG03: 72 BPM
WBC # BLD AUTO: 8.3 K/UL (ref 4.6–13.2)

## 2020-08-03 PROCEDURE — 80048 BASIC METABOLIC PNL TOTAL CA: CPT

## 2020-08-03 PROCEDURE — 85025 COMPLETE CBC W/AUTO DIFF WBC: CPT

## 2020-08-03 PROCEDURE — 36415 COLL VENOUS BLD VENIPUNCTURE: CPT

## 2020-08-03 PROCEDURE — 93005 ELECTROCARDIOGRAM TRACING: CPT

## 2020-08-05 ENCOUNTER — OFFICE VISIT (OUTPATIENT)
Dept: ORTHOPEDIC SURGERY | Age: 65
End: 2020-08-05

## 2020-08-05 VITALS
WEIGHT: 115.4 LBS | RESPIRATION RATE: 16 BRPM | SYSTOLIC BLOOD PRESSURE: 129 MMHG | DIASTOLIC BLOOD PRESSURE: 78 MMHG | TEMPERATURE: 98.6 F | HEIGHT: 62 IN | HEART RATE: 84 BPM | BODY MASS INDEX: 21.23 KG/M2

## 2020-08-05 DIAGNOSIS — M17.12 PRIMARY OSTEOARTHRITIS OF LEFT KNEE: ICD-10-CM

## 2020-08-05 DIAGNOSIS — S83.242A TEAR OF MEDIAL MENISCUS OF LEFT KNEE, CURRENT, UNSPECIFIED TEAR TYPE, INITIAL ENCOUNTER: Primary | ICD-10-CM

## 2020-08-05 RX ORDER — HYDROCODONE BITARTRATE AND ACETAMINOPHEN 7.5; 325 MG/1; MG/1
1 TABLET ORAL
Qty: 40 TAB | Refills: 0 | Status: SHIPPED | OUTPATIENT
Start: 2020-08-05 | End: 2020-08-12

## 2020-08-05 NOTE — PATIENT INSTRUCTIONS
Dr. Eliza Angeles Knee Arthroscopy Surgery    What is the surgery? - This is an outpatient procedure at either [place] [address] or [address] [place], [place] [address] will be completely asleep for procedure. Dr. Eliza Angeles will make 2 small incisions in your knee. He will take a tour of your knee with the camera and then address the meniscal tear(s). We will be able to evaluate if any arthritis in your knee but this surgery is not to treat your arthritis. - Total surgery takes about 25-30 mins     What can you expect after surgery? - You will have a bulky dressing on your knee that you can remove 2 days after surgery. You will be able to shower 2 days after surgery but no soaking in a bath, hot tub, ocean or pool x 2 weeks to allow for full wound healing. No special brace is needed. - You will be on crutches or a walker when you leave the hospital. You can place weight on your leg as tolerated starting immediately. You are usually on crutches or your walker for about 4-5 days.   - Even though you can place weight on your leg, we recommend no walking or standing longer than 10mins for the first week. We will gradually increase your activities after that point.    - Dr. Eliza Angeles will start physical therapy for you when you return for your 1 week post op apt  - It will take your 6-8 weeks to fully recover from your surgery. When can I return to work? - Most patients return to desk work only after 1-2 weeks. We recommend no prolonged walking or standing, climbing, kneeling, or crawling x 6-8 weeks. Not all knee arthroscopies are the same. The specifics of your individual case will be discussed at length with you by Dr. Eliza Angeles and his Physician Assistant. Taz Finney  Surgical Coordinator  [address] [place] [address]. [employer]. [address] [place] [address], [email or web address]  P: [phone]  F: [phone]     Meniscus Tear: Exercises  Introduction  Here are some examples of exercises for you to try.  The exercises may be suggested for a condition or for rehabilitation. Start each exercise slowly. Ease off the exercises if you start to have pain. You will be told when to start these exercises and which ones will work best for you. How to do the exercises  Calf wall stretch   1. Stand facing a wall with your hands on the wall at about eye level. Put your affected leg about a step behind your other leg. 2. Keeping your back leg straight and your back heel on the floor, bend your front knee and gently bring your hip and chest toward the wall until you feel a stretch in the calf of your back leg. 3. Hold the stretch for at least 15 to 30 seconds. 4. Repeat 2 to 4 times. 5. Repeat steps 1 through 4, but this time keep your back knee bent. Hamstring wall stretch   1. Lie on your back in a doorway, with your good leg through the open door. 2. Slide your affected leg up the wall to straighten your knee. You should feel a gentle stretch down the back of your leg. 3. Hold the stretch for at least 1 minute. Then over time, try to lengthen the time you hold the stretch to as long as 6 minutes. 4. Repeat 2 to 4 times. 5. If you do not have a place to do this exercise in a doorway, there is another way to do it:  6. Lie on your back, and bend your affected leg. 7. Loop a towel under the ball and toes of that foot, and hold the ends of the towel in your hands. 8. Straighten your knee, and slowly pull back on the towel. You should feel a gentle stretch down the back of your leg. 9. Hold the stretch for at least 15 to 30 seconds. Or even better, hold the stretch for 1 minute if you can. 10. Repeat 2 to 4 times. 1. Do not arch your back. 2. Do not bend either knee. 3. Keep one heel touching the floor and the other heel touching the wall. Do not point your toes. Quad sets   1. Sit with your leg straight and supported on the floor or a firm bed.  (If you feel discomfort in the front or back of your knee, place a small towel roll under your knee.)  2. Tighten the muscles on top of your thigh by pressing the back of your knee flat down to the floor. (If you feel discomfort under your kneecap, place a small towel roll under your knee.)  3. Hold for about 6 seconds, then rest up to 10 seconds. 4. Do 8 to 12 repetitions several times a day. Straight-leg raises to the front   1. Lie on your back with your good knee bent so that your foot rests flat on the floor. Your injured leg should be straight. Make sure that your low back has a normal curve. You should be able to slip your flat hand in between the floor and the small of your back, with your palm touching the floor and your back touching the back of your hand. 2. Tighten the thigh muscles in the injured leg by pressing the back of your knee flat down to the floor. Hold your knee straight. 3. Keeping the thigh muscles tight, lift your injured leg up so that your heel is about 12 inches off the floor. Hold for 5 seconds and then lower slowly. 4. Do 8 to 12 repetitions. Straight-leg raises to the back   1. Lie on your stomach, and lift your leg straight up behind you (toward the ceiling). 2. Lift your toes about 6 inches off the floor, hold for about 6 seconds, then lower slowly. 3. Do 8 to 12 repetitions. Hamstring curls   1. Lie on your stomach with your knees straight. If your kneecap is uncomfortable, roll up a washcloth and put it under your leg just above your kneecap. 2. Lift the foot of your injured leg by bending the knee so that you bring the foot up toward your buttock. If this motion hurts, try it without bending your knee quite as far. This may help you avoid any painful motion. 3. Slowly lower your leg back to the floor. 4. Do 8 to 12 repetitions. 5. With permission from your doctor or physical therapist, you may also want to add a cuff weight to your ankle (not more than 5 pounds).  With weight, you do not have to lift your leg more than 12 inches to get a hamstring workout. Wall slide with ball squeeze   1. Stand with your back against a wall and with your feet about shoulder-width apart. Your feet should be about 12 inches away from the wall. 2. Put a ball about the size of a soccer ball between your knees. Then slowly slide down the wall until your knees are bent about 20 to 30 degrees. 3. Tighten your thigh muscles by squeezing the ball between your knees. Hold that position for about 10 seconds, then stop squeezing. Rest for up to 10 seconds between repetitions. 4. Repeat 8 to 12 times. Heel raises   1. Stand with your feet a few inches apart, with your hands lightly resting on a counter or chair in front of you. 2. Slowly raise your heels off the floor while keeping your knees straight. 3. Hold for about 6 seconds, then slowly lower your heels to the floor. 4. Do 8 to 12 repetitions several times during the day. Heel dig bridging   Stop doing this exercise if it causes pain. 1. Lie on your back with both knees bent and your ankles bent so that only your heels are digging into the floor. Your knees should be bent about 90 degrees. 2. Then push your heels into the floor, squeeze your buttocks, and lift your hips off the floor until your shoulders, hips, and knees are all in a straight line. 3. Hold for about 6 seconds as you continue to breathe normally, and then slowly lower your hips back down to the floor and rest for up to 10 seconds. 4. Do 8 to 12 repetitions. Shallow standing knee bends   Do this exercise only if you have very little pain; if you have no clicking, locking, or giving way in the injured knee; and if it does not hurt while you are doing 8 to 12 repetitions. 1. Stand with your hands lightly resting on a counter or chair in front of you. Put your feet shoulder-width apart. 2. Slowly bend your knees so that you squat down like you are going to sit in a chair.  Make sure your knees do not go in front of your toes.  3. Lower yourself about 6 inches. Your heels should remain on the floor at all times. 4. Rise slowly to a standing position. Follow-up care is a key part of your treatment and safety. Be sure to make and go to all appointments, and call your doctor if you are having problems. It's also a good idea to know your test results and keep a list of the medicines you take. Where can you learn more? Go to http://lori-cherelle.info/  Enter C183 in the search box to learn more about \"Meniscus Tear: Exercises. \"  Current as of: March 2, 2020               Content Version: 12.5  © 8996-5817 [employer]. Care instructions adapted under license by [employer] (which disclaims liability or warranty for this information). If you have questions about a medical condition or this instruction, always ask your healthcare professional. [address] any warranty or liability for your use of this information. Meniscus Tear: Care Instructions  Your Care Instructions     The meniscus is rubbery tissue in the knee that acts as a shock absorber between the upper and lower leg bones. The meniscus also keeps your knee stable by spreading weight across it. Each knee has two menisci (plural of meniscus). You can tear a meniscus if you plant your foot and twist, or pivot. The meniscus also can wear down as you age, and it can tear from squatting or kneeling. Small tears may heal on their own with rest and some physical therapy. But a more serious tear may need surgery to repair it or to remove part of the meniscus. Your doctor may want you to see a doctor who specializes in bones and sports injuries. Follow-up care is a key part of your treatment and safety. Be sure to make and go to all appointments, and call your doctor if you are having problems. It's also a good idea to know your test results and keep a list of the medicines you take.   How can you care for yourself at home? · Rest your knee when possible. · Do not squat or kneel. · Take pain medicines exactly as directed. ? If the doctor gave you a prescription medicine for pain, take it as prescribed. ? If you are not taking a prescription pain medicine, ask your doctor if you can take an over-the-counter medicine. · Put ice or a cold pack on your knee for 10 to 20 minutes at a time. Try to do this every 1 to 2 hours for the next 3 days (when you are awake) or until the swelling goes down. Put a thin cloth between the ice and your skin. · Prop up the sore leg on a pillow when you ice your knee or any time you sit or lie down during the next 3 days. Try to keep your leg above the level of your heart. This will help reduce swelling. · Follow your doctor's directions for using crutches or a knee brace, if suggested. · Follow your doctor's directions for exercises to keep your knee mobile and your leg muscles strong. Here are a few exercises you can try if your doctor says it is okay. ? Quad sets: Lie down on the floor or the bed with your injured leg straight. Fully extend your leg--there should be no or little bend in your knee. Tighten the thigh (quadriceps) of your injured leg for 6 seconds. Do not lift your heel up. Relax your quadriceps for 10 seconds. Repeat this exercise 8 to 12 times several times during the day. ? Straight-leg raises: Lie down on the floor or the bed with your injured leg flat and your uninjured leg bent so that the bottom of your foot is on the floor or bed. Tighten the quadriceps of your injured leg. Keeping your knee as straight as possible, lift your injured leg off the bed until it is about 18 inches above the bed or floor. Lower your leg back down and relax for 5 seconds. Do 3 sets of 20 repetitions, or if you tire quickly, 3 sets of 8 to 12 repetitions. ? Heel raises: Stand with your feet a few inches apart. Rest your hands lightly on a counter or chair in front of you. Slowly raise your heels off the floor while keeping your knees straight. Hold for 3 seconds, then slowly lower your heels to the floor. Do 3 sets of 8 to 12 repetitions. ? Heel slides: Lie down on the floor or the bed with your leg flat. Slowly begin to slide your heel toward your rear end (buttocks), keeping your heel on the floor. Your knee will begin to bend. Slide your heel and bend your knee until it becomes a little sore and you can feel a small amount of pressure inside your knee. Hold this position for 10 seconds. Slide your heel back down until your leg is straight on the floor. Relax for 10 seconds. Repeat this exercise 20 times. When should you call for help? Watch closely for changes in your health, and be sure to contact your doctor if:  · You have increasing knee pain or swelling or both. · Your knee is so sore or stiff that you cannot walk on it. · You do not get better as expected. Where can you learn more? Go to http://www.gray.com/  Enter J081 in the search box to learn more about \"Meniscus Tear: Care Instructions. \"  Current as of: March 2, 2020               Content Version: 12.5  © 1389-7324 [employer]. Care instructions adapted under license by [employer] (which disclaims liability or warranty for this information). If you have questions about a medical condition or this instruction, always ask your healthcare professional. [place] [address] any warranty or liability for your use of this information.

## 2020-08-06 NOTE — PROGRESS NOTES
HISTORY AND PHYSICAL          Patient: Alma Wallace                MRN: [id number]       SSN: [id number]  YOB: 1955          AGE: 60 y.o. SEX: female      Patient scheduled for:  Left knee arthroscopic partial medial menisectomy    Surgeon: Geovany De La Cruz MD    ANESTHESIA TYPE:  General    HISTORY:     The patient was seen in the office today for a preoperative history and physical for an upcoming above listed surgery. The patient is a pleasant 60 y.o. female who has a history of left knee pain. Patient rates pain as 2/10 today. Pain has been present for awhile, patient was last seen here in October 2019. Pt completed the Euflexxa series for her right knee on 6/27/18 and her left knee on 5/09/18. She states the injections provided relief for about 3 months. Pt had meniscus surgery in 2012. Pain with walking that moves down to her shin. Pain at nightPain level is a 0/10. Due to the current findings, affected activity of daily living and continued pain and discomfort, surgical intervention is indicated. The alternatives, risks, and complications, including but not limited to infection, blood loss, need for blood transfusion, neurovascular damage, chari-incisional numbness, subcutaneous hematoma, bone fracture, anesthetic complications, DVT, PE, death, RSD, postoperative stiffness and pain, possible surgical scar, delayed healing and nonhealing, reflexive sympathetic dystrophy, damage to blood vessels and nerves, need for more surgery, MI, and stroke,  failure of hardware, gait disturbances,have been discussed. The patient understands and wishes to proceed with surgery.      PAST MEDICAL HISTORY:     Past Medical History:   Diagnosis Date    Arthritis     Back pain     Bursitis of right shoulder     subacromial    Degenerative arthritis of right knee     Depression     DVT of leg (deep venous thrombosis) ([facility]) 7/2015    (L) ankle fx    GERD (gastroesophageal reflux disease)  Herniated disc     Herpes simplex     genital    Hypertension     IBS (irritable bowel syndrome)     Lower extremity pain, bilateral     Migraine headache     Pemphigus vulgaris     Right knee pain     Right shoulder pain     Rosacea     Thyroid disease     Venous (peripheral) insufficiency     Wears glasses        CURRENT MEDICATIONS:     Current Outpatient Medications   Medication Sig Dispense Refill    HYDROcodone-acetaminophen (Norco) 7.5-325 mg per tablet Take 1 Tab by mouth every four (4) hours as needed for Pain for up to 7 days. Max Daily Amount: 6 Tabs. Do not take until after surgery (for acute post operative pain) 40 Tab 0    Aimovig Autoinjector 140 mg/mL injection USE AS DIRECTED, INJECT 1 MILLILITER (140 MILLIGRAMS) SUBCUTANEOUSLY EVERY 30 DAYS      valACYclovir (VALTREX) 1 gram tablet Take 1 Tab by mouth daily. 90 Tab 2    DULoxetine (CYMBALTA) 30 mg capsule Take 30 mg by mouth daily.  levothyroxine (SYNTHROID) 150 mcg tablet TAKE 1 TABLET BY MOUTH  EVERY DAY 90 Tab 3    lidocaine (LIDODERM) 5 % Apply 1 patch to the affected area for 12 hours a day and remove for 12 hours a day. 90 Each 0    MYRBETRIQ 25 mg ER tablet take 1 tablet by mouth once daily if needed  0    polyethylene glycol (MIRALAX) 17 gram/dose powder   1    doxycycline (VIBRA-TABS) 100 mg tablet 100 mg two (2) times a day.  alendronate (FOSAMAX) 70 mg tablet Take 70 mg by mouth every seven (7) days.  predniSONE (DELTASONE) 5 mg tablet Take 5 mg by mouth every other day.  MYCOPHENOLATE MOFETIL (CELLCEPT PO) Take 1 g by mouth two (2) times a day.  buPROPion XL (WELLBUTRIN XL) 300 mg XL tablet Take 400 mg by mouth every morning.  topiramate (TOPAMAX) 100 mg tablet Take  by mouth daily.  zolpidem CR (AMBIEN CR) 12.5 mg tablet Take 12.5 mg by mouth nightly as needed for Sleep.  DIAZEPAM (VALIUM PO) Take 5 mg by mouth three (3) times daily as needed.       cycloSPORINE (RESTASIS) 0.05 % ophthalmic emulsion Administer 1 Drop to both eyes two (2) times a day.  amLODIPine (NORVASC) 10 mg tablet TAKE 1 TABLET BY MOUTH  DAILY 90 Tab 3    RIZATRIPTAN BENZOATE (MAXALT PO) Take  by mouth daily as needed. ALLERGIES:     Allergies   Allergen Reactions    Sulfa (Sulfonamide Antibiotics) Hives         SURGICAL HISTORY:     Past Surgical History:   Procedure Laterality Date    HX APPENDECTOMY      HX COLONOSCOPY  9/2011; 12/23/16; 3/21/17    neg    HX MENISCUS REPAIR      HX SEPTOPLASTY      HX VEIN STRIPPING         SOCIAL HISTORY:     Social History     Socioeconomic History    Marital status:      Spouse name: Not on file    Number of children: Not on file    Years of education: Not on file    Highest education level: Not on file   Tobacco Use    Smoking status: Never Smoker    Smokeless tobacco: Never Used   Substance and Sexual Activity    Alcohol use: Yes     Alcohol/week: 0.0 standard drinks     Comment: One glass of wine per month    Drug use: No       FAMILY HISTORY:     Family History   Problem Relation Age of Onset    Cancer Mother     Heart Disease Father     Hypertension Father     Cancer Maternal Aunt         breast cancer       REVIEW OF SYSTEMS:     Negative for fevers, chills, chest pain, shortness of breath, weight loss, recent illness     General: Negative for fever and chills. No unexpected change in weight. Denies fatigue. No change in appetite. Skin: Negative for rash or itching. HEENT: Negative for congestion, sore throat, neck pain and neck stiffness. No change in vision or hearing. Hasn't noted any enlarged lymph nodes in the neck. Cardiovascular:  Negative for chest pain and palpitations. Has not noted pedal edema. Respiratory: Negative for cough, colds, sinus, hemoptysis, shortness of breath and wheezing.   Gastrointestinal: Negative for nausea and vomiting, rectal bleeding, coffee ground emesis, abdominal pain, diarrhea and constipation. Genitourinary: Negative for dysuria, frequency urgency, or burning on micturition. No flank pain, no foul smelling urine, no difficulty with initiating urination. Hematological: Negative for bleeding or easy bruising. Musculoskeletal: Negative  for arthralgias, back pain or neck pain. Neurological: Negative for dizziness, seizures or syncopal episodes. Denies headaches. Endocrine: Denies excessive thirst.  No heat/cold intolerance. Psychiatric: Negative for depression or insomnia. PHYSICAL EXAMINATION:     VITALS:   Visit Vitals  /78 (BP 1 Location: Left arm, BP Patient Position: Sitting)   Pulse 84   Temp 98.6 °F (37 °C) (Oral)   Resp 16   Ht 5' 2\" (1.575 m)   Wt 115 lb 6.4 oz (52.3 kg)   BMI 21.11 kg/m²     GEN:  Well developed, well nourished 60 y.o. female in no acute distress. HEENT: Normocephalic and atraumatic. Eyes: Conjunctivae and EOM are normal.Pupils are equal, round, and reactive to light. External ear normal appearance, external nose normal appearing. Mouth/Throat: Oropharynx is clear and moist, able to handle oral secretions w/out difficulty, airway patent  NECK: Supple. Normal ROM, No lymphadenopathy. Trachea is midline. No bruising, swelling or deformity  RESP: Clear to auscultation bilaterally. No wheezes, rales, rhonchi. Normal effort and breath sounds. No respiratory distress  CARDIO:  Normal rate, regular rhythm and normal heart sounds. No MGR. ABDOMEN: Soft, non-tender, non-distended, normoactive bowel sounds in all four quadrants. There is no tenderness. There is no rebound and no guarding.    BACK: No CVA or spinal tenderness  BREAST:  Deferred  PELVIC:    Deferred   RECTAL:  Deferred   :           Deferred  EXTREMITIES: EXAMINATION OF: left knee  Examination Left knee   Skin Intact   Range of motion 0-130   Effusion +   Medial joint line tenderness +   Lateral joint line tenderness -   Tenderness Pes Bursa -   Tenderness insertion MCL -   Tenderness insertion LCL -   Zoilas -   Patella crepitus +   Patella grind -   Lachman -   Pivot shift -   Anterior drawer -   Posterior drawer -   Varus stress -   Valgus stress -   Neurovascular Intact   Calf Swelling and Tenderness to Palpation -   Mulu's Test -   Hamstring Cord Tightness -        NEUROVASCULAR: Sensation intact to light touch and strength grossly intact and symmetrical. No nystagmus. Positive distal pulses and capillary refill. DVT ASSESSMENT:  There is not  calf tenderness. No evidence of DVT seen on physical exam.  MOTOR: In tact  PSYCH: Alert an oriented to person, place and time. Mood, memory, affect, behavior and judgment normal       RADIOGRAPHS & DIAGNOSTIC STUDIES:     MRI/xray reveals :   IMAGING: MRI of left knee dated 10/09/2019 was reviewed and read by Dr. Meghan Asif:   IMPRESSION:  1. Combination of postsurgical changes in the medial meniscus and likely recurrent radial tear and perhaps flap tear in the posterior horn. Suspected associated meniscal cysts medially. Mild MCL sprain with possible partial-thickness tear/perforation. 2. Small joint effusion.  Possible previous rupture of popliteal cyst.        XR of left knee dated 10/01/19 was reviewed and read: Decreased joint space on medial side with mild patellofemoral joint changes.         XR of the right knee dated 5/9/18 was reviewed and read: decreased joint space in medial compartment with degenerative changes in medial and patella femoral joint     XR of the left knee dated 4/4/18 was reviewed and read: degenerative arthritis in the patellofemoral joint, mild joint space narrowing in medial compartment           LABS:       @  CBC:   Lab Results   Component Value Date/Time    WBC 8.3 08/03/2020 12:38 PM    RBC 4.22 08/03/2020 12:38 PM    HGB 13.8 08/03/2020 12:38 PM    HCT 42.1 08/03/2020 12:38 PM    PLATELET 706 06/37/1634 12:38 PM    and BMP:   Lab Results   Component Value Date/Time    Glucose 80 08/03/2020 12:38 PM    Sodium 141 08/03/2020 12:38 PM    Potassium 4.1 08/03/2020 12:38 PM    Chloride 110 08/03/2020 12:38 PM    CO2 27 08/03/2020 12:38 PM    BUN 16 08/03/2020 12:38 PM    Creatinine 1.11 08/03/2020 12:38 PM    Calcium 9.3 08/03/2020 12:38 PM   @    Preoperative labs were reviewed and are substantially within normal limits   EKG:Normal sinus rhythm   Low voltage QRS   Nonspecific ST and T wave abnormality   Abnormal ECG   No previous ECGs available   Confirmed by Ginger Zaman ([id number]) on 8/3/2020 7:21:34 PM        ASSESSMENT:       Encounter Diagnoses   Name Primary?  Tear of medial meniscus of left knee, current, unspecified tear type, initial encounter Yes    Primary osteoarthritis of left knee        PLAN:     Again, the alternatives, risks, and complications, as well as expected outcome were discussed. The patient understands and agrees to proceed with left knee arthroscopic partial medial menisectomy. The patient was counseled at length about the risks of clifton Covid-19 during their perioperative period and any recovery window from their procedure. The patient was made aware that clifton Covid-19  may worsen their prognosis for recovering from their procedure and lend to a higher morbidity and/or mortality risk. All material risks, benefits, and reasonable alternatives including postponing the procedure were discussed. The patient does  wish to proceed with the procedure at this time.    Patient given orders listed below:    Orders Placed This Encounter    HYDROcodone-acetaminophen (Norco) 7.5-325 mg per tablet         Juan Diego Workman PA-C  8/6/2020  8:09 AM

## 2020-08-24 ENCOUNTER — OFFICE VISIT (OUTPATIENT)
Dept: ORTHOPEDIC SURGERY | Age: 65
End: 2020-08-24

## 2020-08-24 VITALS
TEMPERATURE: 98.2 F | BODY MASS INDEX: 20.76 KG/M2 | SYSTOLIC BLOOD PRESSURE: 123 MMHG | DIASTOLIC BLOOD PRESSURE: 78 MMHG | HEIGHT: 62 IN | HEART RATE: 77 BPM | RESPIRATION RATE: 16 BRPM | WEIGHT: 112.8 LBS

## 2020-08-24 DIAGNOSIS — M79.89 PAIN AND SWELLING OF LEFT LOWER EXTREMITY: ICD-10-CM

## 2020-08-24 DIAGNOSIS — S83.242A TEAR OF MEDIAL MENISCUS OF LEFT KNEE, CURRENT, UNSPECIFIED TEAR TYPE, INITIAL ENCOUNTER: Primary | ICD-10-CM

## 2020-08-24 DIAGNOSIS — M79.605 PAIN AND SWELLING OF LEFT LOWER EXTREMITY: ICD-10-CM

## 2020-08-24 NOTE — PROGRESS NOTES
Patient: Vannesa Dickey  YOB: 1955       HISTORY:  The patient presents for reevaluation of her left knee status post arthroscopic partial medial menisectomy on 8/17/2020. Patient is improved, states pain is a 0 out of 10.  she has not gone to physical therapy. She feels some stiffness. Concerned she may have a DVT. Patient has h/o dvt over 5years ago. Patient denies any fever, chills, chest pain, shortness of breath or calf pain. The remainder of the review of systems is negative. There are no new illness or injuries to report since last seen in the office. No changes in medications, allergies, social or family history. PHYSICAL EXAMINATION:    Visit Vitals  /78 (BP 1 Location: Left arm, BP Patient Position: Sitting)   Pulse 77   Temp 98.2 °F (36.8 °C) (Oral)   Resp 16   Ht 5' 2\" (1.575 m)   Wt 112 lb 12.8 oz (51.2 kg)   BMI 20.63 kg/m²     The patient is a well-developed, well-nourished female in no acute distress. The patient is alert and oriented times three. The patient appears to be well groomed. Mood and affect are normal.   ORTHOPEDIC EXAM of Left knee: Inspection: Effusion present,  incisions clean, dry intact, sutures in place  TTP: medial menisectomy  Range of motion: 0-115 flexion  Stability: Stable  Strength: 5/5  2+ distal pulses    IMPRESSION:  Status post Left knee arthroscopic partial medial menisectomy. PLAN: Incisions cleaned. Surgery was discussed at length today. Stressed to patient that nothing causes an increase in pain or swelling. Patient is weight bearing as tolerated. OK to d/c use of crutches/walker. Will order STAT doppler r/o DVT. Will set up with physical therapy. Patient does not request refill of pain medication. Patient will follow up in 3 weeks.     [facility]

## 2020-08-25 ENCOUNTER — HOSPITAL ENCOUNTER (OUTPATIENT)
Dept: VASCULAR SURGERY | Age: 65
Discharge: HOME | End: 2020-08-25
Attending: PHYSICIAN ASSISTANT
Payer: COMMERCIAL

## 2020-08-25 DIAGNOSIS — M79.605 PAIN AND SWELLING OF LEFT LOWER EXTREMITY: ICD-10-CM

## 2020-08-25 DIAGNOSIS — M79.89 PAIN AND SWELLING OF LEFT LOWER EXTREMITY: ICD-10-CM

## 2020-08-25 PROCEDURE — 93971 EXTREMITY STUDY: CPT

## 2020-08-31 ENCOUNTER — HOSPITAL ENCOUNTER (OUTPATIENT)
Dept: PHYSICAL THERAPY | Age: 65
Discharge: HOME | End: 2020-08-31
Payer: COMMERCIAL

## 2020-08-31 PROCEDURE — 97110 THERAPEUTIC EXERCISES: CPT

## 2020-08-31 PROCEDURE — 97162 PT EVAL MOD COMPLEX 30 MIN: CPT

## 2020-08-31 NOTE — PROGRESS NOTES
PT DAILY TREATMENT NOTE 10-18    Patient Name: Yahaira Farah  Date:2020  : 1955  [x]  Patient  Verified  Payor: BLUE CROSS / Plan: [address] [place] [address] / Product Type: PPO /    In time:3:00  Out time:3:30  Total Treatment Time (min): 30  Visit #: 1 of 8    Medicare/BCBS Only   Total Timed Codes (min):  15 1:1 Treatment Time:  15       Treatment Area: Other tear of medial meniscus, current injury, left knee, initial encounter [S83.242A]    SUBJECTIVE  Pain Level (0-10 scale): 4/10  Any medication changes, allergies to medications, adverse drug reactions, diagnosis change, or new procedure performed?: [x] No    [] Yes (see summary sheet for update)  Subjective functional status/changes:   [] No changes reported  The patient has a chief complaint of left knee pain and is s/p left knee arthroscopy performed on 2020. OBJECTIVE  15 min [x]Eval                  []Re-Eval       15 min Therapeutic Exercise:  [] See flow sheet :   Rationale: increase ROM and increase strength to improve the patients ability to improve ADL ease. With   [] TE   [] TA   [] neuro   [] other: Patient Education: [x] Review HEP    [] Progressed/Changed HEP based on:   [] positioning   [] body mechanics   [] transfers   [] heat/ice application    [] other:      Other Objective/Functional Measures: See IE     Pain Level (0-10 scale) post treatment: 0/10    ASSESSMENT/Changes in Function: See POC. Patient will continue to benefit from skilled PT services to modify and progress therapeutic interventions, address functional mobility deficits, address ROM deficits, address strength deficits, analyze and address soft tissue restrictions, analyze and cue movement patterns, analyze and modify body mechanics/ergonomics, assess and modify postural abnormalities and instruct in home and community integration to attain remaining goals.      []  See Plan of Care  []  See progress note/recertification  []  See Discharge Summary         Progress towards goals / Updated goals:  Short Term Goals: To be accomplished in 2 weeks:               1. The patient will demonstrate independence and compliance with HEP to maximize therapeutic benefit. IE: issued HEP               2. The patient will improve left knee flexion to 150 degrees to improve ease of kneeling. IE: 130 degrees left knee  Long Term Goals: To be accomplished in 4 weeks:               1. The patient will improve FOTO score to 61 to maximize ease of functional activity. IE: 64               2. The patient will improve hip ABD/EXT strength to 4+/5 MMT to maximize stability In stance. IE: 4/5 MMT Hip extension/ABD               3. The patient will demonstrate equal stair negotiation without compensations or HR to resume PLOF. IE: step to, or with compensations at IE. 4. The patient will report a little bit of difficulty with usual housework or school activities to improve ease of chore production.    IE: moderate difficulty    PLAN  []  Upgrade activities as tolerated     [x]  Continue plan of care  []  Update interventions per flow sheet       []  Discharge due to:_  []  Other:_      Edgar Gonzalez, PT 8/31/2020  3:39 PM    Future Appointments   Date Time Provider Paulette Tello   9/1/2020  2:15 PM Bryn Ashby PT [facility]   9/9/2020  1:45 PM Bryn Ashby PT [facility]   9/11/2020  2:15 PM Kelin Levy [facility]   9/14/2020  1:15 PM ZURI Dorsey [address]   9/15/2020 10:30 AM [place], [address] [facility]   9/18/2020  1:00 PM Bryn Ashby PT [facility]   9/21/2020 12:45 PM Bryn Ashby PT [facility]   9/23/2020  1:45 PM Kelin Levy [facility]

## 2020-08-31 NOTE — PROGRESS NOTES
[facility]  [address]  [place], [address]  [phone] fax    Plan of Care/ Statement of Necessity for Physical Therapy Services    Patient name: Giovanni Woodall Start of Care: 2020   Referral source: Kian Acevedo,* : 1955    Medical Diagnosis: Other tear of medial meniscus, current injury, left knee, initial encounter [S83.242A]  Payor: [email or web address] / Plan: [address] [place] [address] / Product Type: PPO /  Onset Date:2020    Treatment Diagnosis: Left knee pain   Prior Hospitalization: see medical history Provider#: [id number]   Medications: Verified on Patient summary List    Comorbidities: Depression, Osteoporosis, meniscus tear, HTN, pemphigus vulgaris autoimmune disease   Prior Level of Function: The patient reports that she had pain with walking and standing prior to surgery. The Plan of Care and following information is based on the information from the initial evaluation. Assessment/ key information: the patient is a 60year old female with a chief complaint of left knee pain s/p left knee arthroscopy performed on 2020. The demonstrates good range of motion with equal circumferential measures bilaterally. She denies AD use, and presents with impairments consisting of pain, decreased ROM, decreased strength, decreased ambulation efficiency, decreased ADL ease, and decreased quality of life. The patient will benefit from skilled PT in order to address the aforementioned impairments. Evaluation Complexity History MEDIUM  Complexity : 1-2 comorbidities / personal factors will impact the outcome/ POC ; Examination MEDIUM Complexity : 3 Standardized tests and measures addressing body structure, function, activity limitation and / or participation in recreation  ;Presentation MEDIUM Complexity : Evolving with changing characteristics  ; Clinical Decision Making MEDIUM Complexity : FOTO score of 26-74  Overall Complexity Rating: MEDIUM  Problem List: pain affecting function, decrease ROM, decrease strength, edema affecting function, impaired gait/ balance, decrease ADL/ functional abilitiies, decrease activity tolerance, decrease flexibility/ joint mobility and decrease transfer abilities   Treatment Plan may include any combination of the following: Therapeutic exercise, Therapeutic activities, Neuromuscular re-education, Physical agent/modality, Gait/balance training, Manual therapy, Patient education and Functional mobility training  Patient / Family readiness to learn indicated by: asking questions, trying to perform skills and interest  Persons(s) to be included in education: patient (P)  Barriers to Learning/Limitations: None  Patient Goal (s): walk 3-5 miles as I used to do yoga, complete a leg workout, use the elliptical.  Patient Self Reported Health Status: fair  Rehabilitation Potential: good    Short Term Goals: To be accomplished in 2 weeks:   1. The patient will demonstrate independence and compliance with HEP to maximize therapeutic benefit. 2. The patient will improve left knee flexion to 150 degrees to improve ease of kneeling. Long Term Goals: To be accomplished in 4 weeks:   1. The patient will improve FOTO score to 61 to maxiize ease of functional activity. 2. The patient will improve hip ABD/EXT strength to 4+/5 MMT to maximize stability In stance. 3. The patient will demonstrate equal stair negotiation without compensations or HR to resume PLOF. 4. The patient will report moderate difficulty with usual housework or school activities to improve ease of chore production. Frequency / Duration: Patient to be seen 2 times per week for 4 weeks.     Patient/ Caregiver education and instruction: Diagnosis, prognosis, self care, activity modification and exercises   [x]  Plan of care has been reviewed with KRYSTIAN Ferrara, PT 8/31/2020 3:28 PM    ________________________________________________________________________    I certify that the above Therapy Services are being furnished while the patient is under my care. I agree with the treatment plan and certify that this therapy is necessary.     Physician's Signature:____________Date:_________TIME:________    ** Signature, Date and Time must be completed for valid certification **    Please sign and return to [facility] [address]  [address]  [place], [address]  [phone] fax

## 2020-09-01 ENCOUNTER — HOSPITAL ENCOUNTER (OUTPATIENT)
Dept: PHYSICAL THERAPY | Age: 65
Discharge: HOME | End: 2020-09-01
Payer: COMMERCIAL

## 2020-09-01 PROCEDURE — 97112 NEUROMUSCULAR REEDUCATION: CPT

## 2020-09-01 PROCEDURE — 97110 THERAPEUTIC EXERCISES: CPT

## 2020-09-01 NOTE — PROGRESS NOTES
PT DAILY TREATMENT NOTE 10-18    Patient Name: Eileen Somers  Date:2020  : 1955  [x]  Patient  Verified  Payor: FADI ANDREI / Plan: [address] [place] [address] / Product Type: PPO /    In time:2:15  Out time:3:03  Total Treatment Time (min): 48  Visit #: 2 of 8    Medicare/BCBS Only   Total Timed Codes (min): 48   1:1 Treatment Time:  48       Treatment Area: Left knee pain [M25.562]    SUBJECTIVE  Pain Level (0-10 scale): 1/10  Any medication changes, allergies to medications, adverse drug reactions, diagnosis change, or new procedure performed?: [x] No    [] Yes (see summary sheet for update)  Subjective functional status/changes:   [] No changes reported  The patient states she has been compliant with HEP, and denies new symptoms upon arrival.    OBJECTIVE  Modality rationale: decrease edema, decrease inflammation and decrease pain to improve the patients ability to improve ADL ease. Min Type Additional Details   10 [x]  Vasopneumatic Device Pressure:       [x] lo [] med [] hi   Temperature: [x] lo [] med [] hi   [] Skin assessment post-treatment:  []intact []redness- no adverse reaction    []redness  adverse reaction:     36 min Therapeutic Exercise:  [x] See flow sheet :   Rationale: increase ROM and increase strength to improve the patients ability to improve ADL ease. 12 min Neuromuscular Re-education:  [x]  See flow sheet :   Rationale: increase ROM and increase strength  to improve the patients ability to improve ADL ease. With   [] TE   [] TA   [] neuro   [] other: Patient Education: [x] Review HEP    [] Progressed/Changed HEP based on:   [] positioning   [] body mechanics   [] transfers   [] heat/ice application    [] other:      Other Objective/Functional Measures:   HEP reported compliance. Initiated session without increase in pain, but patient challenged by quad strengthening interventions in closed chain.       Pain Level (0-10 scale) post treatment: 0/10    ASSESSMENT/Changes in Function: The patient demonstrates fatigue through her quad, but no joint pain throughout session. She completed modality and left void of pain. Patient will continue to benefit from skilled PT services to modify and progress therapeutic interventions, address functional mobility deficits, address ROM deficits, address strength deficits, analyze and address soft tissue restrictions, analyze and cue movement patterns, analyze and modify body mechanics/ergonomics, assess and modify postural abnormalities and instruct in home and community integration to attain remaining goals. []  See Plan of Care  []  See progress note/recertification  []  See Discharge Summary         Progress towards goals / Updated goals:  Short Term Goals: To be accomplished in 2 weeks:               1. The patient will demonstrate independence and compliance with HEP to maximize therapeutic benefit. IE: issued HEP   Current: Met 9/01/2020               2. The patient will improve left knee flexion to 150 degrees to improve ease of kneeling. IE: 130 degrees left knee  Long Term Goals: To be accomplished in 4 weeks:               1. The patient will improve FOTO score to 61 to maximize ease of functional activity. IE: 64               2. The patient will improve hip ABD/EXT strength to 4+/5 MMT to maximize stability In stance. IE: 4/5 MMT Hip extension/ABD               3. The patient will demonstrate equal stair negotiation without compensations or HR to resume PLOF. IE: step to, or with compensations at IE.               4. The patient will report a little bit of difficulty with usual housework or school activities to improve ease of chore production.               IE: moderate difficulty     PLAN  []  Upgrade activities as tolerated     [x]  Continue plan of care  []  Update interventions per flow sheet       []  Discharge due to:_  []  Other:_ Rahul Sanchez, PT 9/1/2020  2:19 PM    Future Appointments   Date Time Provider Paulette Elisha   9/9/2020  1:45 PM Cyndy Gilford, PT [facility]   9/11/2020  2:15 PM Mary Lightning [facility]   9/14/2020  1:15 PM ZURI Garcia 69   9/15/2020 10:30 AM Harris, [address] [facility]   9/18/2020  1:00 PM Cyndy Gilford, PT [facility]   9/21/2020 12:45 PM Cyndy Gilford, PT [facility]   9/23/2020  1:45 PM Mary Lightning [facility]

## 2020-09-09 ENCOUNTER — HOSPITAL ENCOUNTER (OUTPATIENT)
Dept: PHYSICAL THERAPY | Age: 65
Discharge: HOME | End: 2020-09-09
Payer: COMMERCIAL

## 2020-09-09 PROCEDURE — 97110 THERAPEUTIC EXERCISES: CPT

## 2020-09-09 PROCEDURE — 97112 NEUROMUSCULAR REEDUCATION: CPT

## 2020-09-09 NOTE — PROGRESS NOTES
PT DAILY TREATMENT NOTE 10-18    Patient Name: Yahaira Farah  Date:2020  : 1955  [x]  Patient  Verified  Payor: FADI Proctor / Plan: [address] [place] [address] / Product Type: PPO /   In time:1:45  Out time: 2:45  Total Treatment Time (min): 60  Visit #: 3 of 8    Medicare/BCBS Only   Total Timed Codes (min):  50 1:1 Treatment Time:  50     Treatment Area: Left knee pain [M25.562]    SUBJECTIVE  Pain Level (0-10 scale): 1/10  Any medication changes, allergies to medications, adverse drug reactions, diagnosis change, or new procedure performed?: [x] No    [] Yes (see summary sheet for update)  Subjective functional status/changes:   - No changes reported  The patient denies new issues and states her low grade pain is more a result of the weather. OBJECTIVE  Modality rationale: decrease edema, decrease inflammation and decrease pain to improve the patients ability to improve ADL ease. Min Type Additional Details   10 [x]  Vasopneumatic Device Pressure:       [x] lo [] med [] hi   Temperature: [x] lo [] med [] hi   [] Skin assessment post-treatment:  []intact []redness- no adverse reaction    []redness  adverse reaction:     38 min Therapeutic Exercise:  [x] See flow sheet :   Rationale: increase ROM and increase strength to improve the patients ability to improve ADL ease. 12 min Neuromuscular Re-education:  [x]  See flow sheet :   Rationale: increase ROM and increase strength  to improve the patients ability to improve ADL ease. With   [] TE   [] TA   [] neuro   [] other: Patient Education: [x] Review HEP    [] Progressed/Changed HEP based on:   [] positioning   [] body mechanics   [] transfers   [] heat/ice application    [] other:      Other Objective/Functional Measures:   Knee flexion: 140 degrees    Pain Level (0-10 scale) post treatment: 0/10    ASSESSMENT/Changes in Function: Excellent progress regarding strengthening. Able to add shuttle press with light resistance. Increased step ups to 6\" without notable compensations. The patient demonstrates less stability in single leg stance left as compare. Patient will continue to benefit from skilled PT services to modify and progress therapeutic interventions, address functional mobility deficits, address ROM deficits, address strength deficits, analyze and address soft tissue restrictions, analyze and cue movement patterns, analyze and modify body mechanics/ergonomics, assess and modify postural abnormalities and instruct in home and community integration to attain remaining goals. []  See Plan of Care  []  See progress note/recertification  []  See Discharge Summary         Progress towards goals / Updated goals:  Short Term Goals: To be accomplished in 2 weeks:               1. The patient will demonstrate independence and compliance with HEP to maximize therapeutic benefit.              IE: issued HEP              Current: Met 9/01/2020               2. The patient will improve left knee flexion to 150 degrees to improve ease of kneeling.              IE: 130 degrees left knee   Current: Improved to 146 degrees 9/09/2020   Long Term Goals: To be accomplished in 4 weeks:               1. The patient will improve FOTO score to 61 to maximize ease of functional activity.             JR: 34               5. The patient will improve hip ABD/EXT strength to 4+/5 MMT to maximize stability In stance.              IE: 4/5 MMT Hip extension/ABD               3. The patient will demonstrate equal stair negotiation without compensations or HR to resume PLOF.             SD: step to, or with compensations at IE.               4.  The patient will report a little bit of difficulty with usual housework or school activities to improve ease of chore production.              IE: moderate difficulty    PLAN  []  Upgrade activities as tolerated     [x]  Continue plan of care  []  Update interventions per flow sheet       []  Discharge due to:_  []  Other:_      Danna Cockayne, PT 9/9/2020  1:51 PM    Future Appointments   Date Time Provider Paulette Tello   9/11/2020  2:15 PM Relda Juan R [facility]   9/14/2020  1:15 PM ZURI Urena   9/15/2020 10:30 AM Harris, [address] [facility]   9/18/2020  1:00 PM Layla Davis, PT [facility]   9/21/2020 12:45 PM Layla Davis PT [facility]   9/23/2020  1:45 PM Yani Pete [facility]

## 2020-09-11 ENCOUNTER — HOSPITAL ENCOUNTER (OUTPATIENT)
Dept: PHYSICAL THERAPY | Age: 65
Discharge: HOME | End: 2020-09-11
Payer: COMMERCIAL

## 2020-09-11 PROCEDURE — 97112 NEUROMUSCULAR REEDUCATION: CPT

## 2020-09-11 PROCEDURE — 97110 THERAPEUTIC EXERCISES: CPT

## 2020-09-11 NOTE — PROGRESS NOTES
PT DAILY TREATMENT NOTE 10-18    Patient Name: Moon Flores  Date:2020  : 1955  [x]  Patient  Verified  Payor: Loura Kussmaul / Plan: [address] [place] [address] / Product Type: PPO /    In time:2:15  Out time:3:07  Total Treatment Time (min): 52  Visit #: 4 of 8    Medicare/BCBS Only   Total Timed Codes (min):  42 1:1 Treatment Time:  42       Treatment Area: Left knee pain [M25.562]    SUBJECTIVE  Pain Level (0-10 scale): 1  Any medication changes, allergies to medications, adverse drug reactions, diagnosis change, or new procedure performed?: [x] No    [] Yes (see summary sheet for update)  Subjective functional status/changes:   [] No changes reported  The pt reports that her knee is doing well.  No adverse response to last session    OBJECTIVE    Modality rationale: decrease inflammation and decrease pain to improve the patients ability to perform functional tasks with less soreness   Min Type Additional Details    [] Estim:  []Unatt       []IFC  []Premod                        []Other:  []w/ice   []w/heat  Position:  Location:    [] Estim: []Att    []TENS instruct  []NMES                    []Other:  []w/US   []w/ice   []w/heat  Position:  Location:    []  Traction: [] Cervical       []Lumbar                       [] Prone          []Supine                       []Intermittent   []Continuous Lbs:  [] before manual  [] after manual    []  Ultrasound: []Continuous   [] Pulsed                           []1MHz   []3MHz W/cm2:  Location:    []  Iontophoresis with dexamethasone         Location: [] Take home patch   [] In clinic    []  Ice     []  heat  []  Ice massage  []  Laser   []  Anodyne Position:  Location:    []  Laser with stim  []  Other:  Position:  Location:   10 [x]  Vasopneumatic Device Pressure:       [x] lo [] med [] hi   Temperature: [x] lo [] med [] hi   [] Skin assessment post-treatment:  []intact []redness- no adverse reaction    []redness  adverse reaction:         34 min Therapeutic Exercise:  [] See flow sheet :   Rationale: increase ROM and increase strength to improve the patients ability to improve ease of ADLs and self care    8 min Neuromuscular Re-education:  [x]  See flow sheet :   Rationale: improve balance and increase proprioception  to improve the patients ability to perform functional tasks with improved stability         With   [] TE   [] TA   [] neuro   [] other: Patient Education: [x] Review HEP    [] Progressed/Changed HEP based on:   [] positioning   [] body mechanics   [] transfers   [] heat/ice application    [] other:      Other Objective/Functional Measures: some balance challenge with hip x 3 void of UE assist     Pain Level (0-10 scale) post treatment: 0    ASSESSMENT/Changes in Function: The pt demonstrates improving strength and functional task performance. Some fatigue noted near end of session but no pain reports throughout. Patient will continue to benefit from skilled PT services to modify and progress therapeutic interventions, address functional mobility deficits, address ROM deficits, address strength deficits, analyze and address soft tissue restrictions, analyze and cue movement patterns and analyze and modify body mechanics/ergonomics to attain remaining goals. []  See Plan of Care  []  See progress note/recertification  []  See Discharge Summary         Progress towards goals / Updated goals:  Short Term Goals: To be accomplished in 2 weeks:               1. The patient will demonstrate independence and compliance with HEP to maximize therapeutic benefit.              IE: issued HEP              [id number]: Met 9/01/2020               2. The patient will improve left knee flexion to 150 degrees to improve ease of kneeling.              IE: 130 degrees left knee              Current: Improved to 146 degrees 9/09/2020   Long Term Goals: To be accomplished in 4 weeks:               1.  The patient will improve FOTO score to 61 to maximize ease of functional activity.             WT: 95               4. The patient will improve hip ABD/EXT strength to 4+/5 MMT to maximize stability In stance.              IE: 4/5 MMT Hip extension/ABD               3. The patient will demonstrate equal stair negotiation without compensations or HR to resume PLOF.             BQ: step to, or with compensations at IE.               4.  The patient will report a little bit of difficulty with usual housework or school activities to improve ease of chore production.              IE: moderate difficulty    PLAN  []  Upgrade activities as tolerated     [x]  Continue plan of care  []  Update interventions per flow sheet       []  Discharge due to:_  []  Other:_      Nicho Six DPT, CMTPT 9/11/2020  2:18 PM    Future Appointments   Date Time Provider Paulette Tello   9/14/2020  1:15 PM ZURI Winston Michael 69   9/15/2020 10:30 AM Harris, [address] [facility]   9/18/2020  1:00 PM [facility]   9/21/2020 12:45 PM [facility]   9/23/2020  1:45 PM [facility]

## 2020-09-14 ENCOUNTER — OFFICE VISIT (OUTPATIENT)
Dept: ORTHOPEDIC SURGERY | Age: 65
End: 2020-09-14

## 2020-09-14 VITALS
TEMPERATURE: 97.7 F | SYSTOLIC BLOOD PRESSURE: 106 MMHG | HEIGHT: 62 IN | BODY MASS INDEX: 20.8 KG/M2 | RESPIRATION RATE: 16 BRPM | WEIGHT: 113 LBS | DIASTOLIC BLOOD PRESSURE: 62 MMHG | HEART RATE: 87 BPM | OXYGEN SATURATION: 90 %

## 2020-09-14 DIAGNOSIS — S83.242A TEAR OF MEDIAL MENISCUS OF LEFT KNEE, CURRENT, UNSPECIFIED TEAR TYPE, INITIAL ENCOUNTER: Primary | ICD-10-CM

## 2020-09-14 NOTE — PROGRESS NOTES
Patient: Carmin Osgood  YOB: 1955       HISTORY:  The patient presents for reevaluation of her left knee status post arthroscopic partial medial menisectomy on 8/17/2020. Patient is improved, states pain is a 2 out of 10.  she has gone to physical therapy. Just feels sore. Patient denies any fever, chills, chest pain, shortness of breath or calf pain. The remainder of the review of systems is negative. There are no new illness or injuries to report since last seen in the office. No changes in medications, allergies, social or family history. PHYSICAL EXAMINATION:    Visit Vitals  /62 (BP 1 Location: Right arm, BP Patient Position: Sitting)   Pulse 87   Temp 97.7 °F (36.5 °C)   Resp 16   Ht 5' 2\" (1.575 m)   Wt 113 lb (51.3 kg)   SpO2 90%   BMI 20.67 kg/m²     The patient is a well-developed, well-nourished female in no acute distress. The patient is alert and oriented times three. The patient appears to be well groomed. Mood and affect are normal.   ORTHOPEDIC EXAM of Left knee: Inspection: Effusion not present,  incisions well healed  TTP: medial joint line  Range of motion: 0-120 flexion  Stability: Stable  Strength: 5/5  2+ distal pulses    IMPRESSION:  Status post Left knee arthroscopic partial medial menisectomy. PLAN:   1. Patient improving post operatively  2. Will cont with PT gradually increasing activities.  Transition to HEP when ready  RTC PRN    JACKY Posey [facility]

## 2020-09-15 ENCOUNTER — OFFICE VISIT (OUTPATIENT)
Dept: ORTHOPEDIC SURGERY | Age: 65
End: 2020-09-15

## 2020-09-15 ENCOUNTER — HOSPITAL ENCOUNTER (OUTPATIENT)
Dept: PHYSICAL THERAPY | Age: 65
Discharge: HOME | End: 2020-09-15
Payer: COMMERCIAL

## 2020-09-15 VITALS
BODY MASS INDEX: 20.98 KG/M2 | SYSTOLIC BLOOD PRESSURE: 137 MMHG | HEART RATE: 71 BPM | WEIGHT: 114 LBS | OXYGEN SATURATION: 100 % | HEIGHT: 62 IN | TEMPERATURE: 97.5 F | RESPIRATION RATE: 15 BRPM | DIASTOLIC BLOOD PRESSURE: 81 MMHG

## 2020-09-15 DIAGNOSIS — M75.51 SUBACROMIAL BURSITIS OF RIGHT SHOULDER JOINT: Primary | ICD-10-CM

## 2020-09-15 DIAGNOSIS — M25.511 RIGHT SHOULDER PAIN, UNSPECIFIED CHRONICITY: ICD-10-CM

## 2020-09-15 PROCEDURE — 97110 THERAPEUTIC EXERCISES: CPT

## 2020-09-15 PROCEDURE — 97112 NEUROMUSCULAR REEDUCATION: CPT

## 2020-09-15 NOTE — PROGRESS NOTES
Berny Lopez  1955   Chief Complaint   Patient presents with    Shoulder Pain     right shoulder pain        HISTORY OF PRESENT ILLNESS  Berny Lopez is a 60 y.o. female who presents today for evaluation of right shoulder. Patient rates pain as 0/10 today. Pain has been present off and on for around 6 months. No specific injury. Pain with lifting overhead, lifting weights. She is interested in PT. Also notes pain with swimming, yoga. Pain at night on occasion. Pt is also left knee status post arthroscopic partial medial menisectomy on 8/17/2020, is doing well with this. Patient denies any fever, chills, chest pain, shortness of breath or calf pain. The remainder of the review of systems is negative. There are no new illness or injuries to report since last seen in the office. There are no changes to medications, allergies, family or social history. PHYSICAL EXAM:   Visit Vitals  /81 (BP 1 Location: Left arm, BP Patient Position: Sitting)   Pulse 71   Temp 97.5 °F (36.4 °C) (Oral)   Resp 15   Ht 5' 2\" (1.575 m)   Wt 114 lb (51.7 kg)   SpO2 100%   BMI 20.85 kg/m²     The patient is a well-developed, well-nourished female   in no acute distress. The patient is alert and oriented times three. The patient is alert and oriented times three. Mood and affect are normal.  LYMPHATIC: lymph nodes are not enlarged and are within normal limits  SKIN: normal in color and non tender to palpation. There are no bruises or abrasions noted. NEUROLOGICAL: Motor sensory exam is within normal limits. Reflexes are equal bilaterally.  There is normal sensation to pinprick and light touch  MUSCULOSKELETAL:  Examination Right shoulder   Skin Intact   AC joint tenderness -   Biceps tenderness -   Forward flexion/Elevation    Active abduction    Glenohumeral abduction 90   External rotation ROM 30   Internal rotation ROM 30   Apprehension -   Tarshas Relocation -   Jerk -   Load and Shift - Dorota Colwich -   Speeds -   Impingement sign  +   Supraspinatus/Empty Can -, 5/5   External Rotation Strength -, 5/5   Lift Off/Belly Press -, 5/5   Neurovascular Intact         IMAGING: XR of right shoulder obtained in the office dated 9/15/2020 was reviewed and read by Dr. Neelima Lopez: No acute abnormalities      IMPRESSION:      ICD-10-CM ICD-9-CM    1. Subacromial bursitis of right shoulder joint  M75.51 726.19 REFERRAL TO PHYSICAL THERAPY   2. Right shoulder pain, unspecified chronicity  M25.511 719.41 AMB POC XRAY, SHOULDER; COMPLETE, 2+        PLAN:   1. Pt presents today with right shoulder pain due to subacromial bursitis and I would like for her to begin PT. Will see her back in 4 weeks. Risk factors include: htn  2. No ultrasound exam indicated today  3. No cortisone injection indicated today   4. Yes Physical/Occupational Therapy indicated today  5. No diagnostic test indicated today:   6. No durable medical equipment indicated today  7. No referral indicated today   8. No medications indicated today:   9. No Narcotic indicated today       RTC 4 weeks      Scribed by Espinoza Guillen [facility] [address]) as dictated by Noemí Hudson MD    I, Dr. Noemí Hudson, confirm that all documentation is accurate.     Noemí Hudson M.D.   [facility]

## 2020-09-15 NOTE — PROGRESS NOTES
PT DAILY TREATMENT NOTE 10-18    Patient Name: Marlys Alcala  Date:9/15/2020  : 1955  [x]  Patient  Verified  Payor: [employer] [place] / Plan: [address] [place] [address] / Product Type: PPO /    In time:10:31  Out time:11:23  Total Treatment Time (min): 52  Visit #: 5 of 8    Medicare/BCBS Only   Total Timed Codes (min):  42 1:1 Treatment Time:  42       Treatment Area: Left knee pain [M25.562]    SUBJECTIVE  Pain Level (0-10 scale): 1  Any medication changes, allergies to medications, adverse drug reactions, diagnosis change, or new procedure performed?: [x] No    [] Yes (see summary sheet for update)  Subjective functional status/changes:   [] No changes reported  The pt reports she was a little sore this weekend ~2/10.  She reports taking a 15 minute walk prior to today's session and is a little sore    OBJECTIVE    Modality rationale: decrease inflammation and decrease pain to improve the patients ability to perform ADLs with less pain   Min Type Additional Details    [] Estim:  []Unatt       []IFC  []Premod                        []Other:  []w/ice   []w/heat  Position:  Location:    [] Estim: []Att    []TENS instruct  []NMES                    []Other:  []w/US   []w/ice   []w/heat  Position:  Location:    []  Traction: [] Cervical       []Lumbar                       [] Prone          []Supine                       []Intermittent   []Continuous Lbs:  [] before manual  [] after manual    []  Ultrasound: []Continuous   [] Pulsed                           []1MHz   []3MHz W/cm2:  Location:    []  Iontophoresis with dexamethasone         Location: [] Take home patch   [] In clinic    []  Ice     []  heat  []  Ice massage  []  Laser   []  Anodyne Position:  Location:    []  Laser with stim  []  Other:  Position:  Location:   10 [x]  Vasopneumatic Device Pressure:       [x] lo [] med [] hi   Temperature: [x] lo [] med [] hi   [] Skin assessment post-treatment:  []intact []redness- no adverse reaction []redness  adverse reaction:       34 min Therapeutic Exercise:  [] See flow sheet :   Rationale: increase ROM and increase strength to improve the patients ability to improve ease of ADL performance and self care    8 min Neuromuscular Re-education:  []  See flow sheet :   Rationale: improve balance and increase proprioception  to improve the patients ability to perform functional tasks with improved unilateral stability         With   [] TE   [] TA   [] neuro   [] other: Patient Education: [x] Review HEP    [] Progressed/Changed HEP based on:   [] positioning   [] body mechanics   [] transfers   [] heat/ice application    [] other:      Other Objective/Functional Measures: added SLS on Airex today     Pain Level (0-10 scale) post treatment: 0    ASSESSMENT/Changes in Function: Held most reps and resistance same as last visit as pt reports a little soreness this weekend. No issues noted during today's session. Continue with stability work progressing as able. Patient will continue to benefit from skilled PT services to modify and progress therapeutic interventions, address functional mobility deficits, address ROM deficits, address strength deficits, analyze and address soft tissue restrictions, analyze and cue movement patterns and analyze and modify body mechanics/ergonomics to attain remaining goals. []  See Plan of Care  []  See progress note/recertification  []  See Discharge Summary         Progress towards goals / Updated goals:  Short Term Goals: To be accomplished in 2 weeks:               1. The patient will demonstrate independence and compliance with HEP to maximize therapeutic benefit.              IE: issued HEP              ZSETSBL: Met 9/01/2020               2.  The patient will improve left knee flexion to 150 degrees to improve ease of kneeling.              IE: 130 degrees left knee              Current: Improved to 146 degrees 9/09/2020   Long Term Goals: To be accomplished in 4 weeks:               1. The patient will improve FOTO score to 61 to maximize ease of functional activity.             UX: 38               0. The patient will improve hip ABD/EXT strength to 4+/5 MMT to maximize stability In stance.              IE: 4/5 MMT Hip extension/ABD   Current: partially met 4+/5 abd  4/5 ext 9/15/2020               3. The patient will demonstrate equal stair negotiation without compensations or HR to resume PLOF.             WX: step to, or with compensations at IE.               4.  The patient will report a little bit of difficulty with usual housework or school activities to improve ease of chore production.              IE: moderate difficulty    PLAN  [x]  Upgrade activities as tolerated     []  Continue plan of care  []  Update interventions per flow sheet       []  Discharge due to:_  []  Other:_      Christine Antoine DPT, CMTPT 9/15/2020  10:36 AM    Future Appointments   Date Time Provider Paulette Fulleristi   9/16/2020  1:00 PM [employer] [employer] [facility]   9/21/2020 12:45 PM [facility] [place]   9/23/2020  1:45 PM [employer], [employer] [facility]   10/13/2020 10:40 AM [facility]

## 2020-09-16 ENCOUNTER — HOSPITAL ENCOUNTER (OUTPATIENT)
Dept: PHYSICAL THERAPY | Age: 65
Discharge: HOME | End: 2020-09-16
Payer: COMMERCIAL

## 2020-09-16 PROCEDURE — 97112 NEUROMUSCULAR REEDUCATION: CPT

## 2020-09-16 PROCEDURE — 97110 THERAPEUTIC EXERCISES: CPT

## 2020-09-16 NOTE — PROGRESS NOTES
PT DAILY TREATMENT NOTE 10-18    Patient Name: James Mcfadden  Date:2020  : 1955  [x]  Patient  Verified  Payor: Yue Zhu / Plan: [address] [place] [address] / Product Type: PPO /    In time:1:00  Out time:1:53  Total Treatment Time (min): 48  Visit #: 6 of 8    Medicare/BCBS Only   Total Timed Codes (min):  43 1:1 Treatment Time:  43       Treatment Area: Left knee pain [M25.562]    SUBJECTIVE  Pain Level (0-10 scale): 1  Any medication changes, allergies to medications, adverse drug reactions, diagnosis change, or new procedure performed?: [x] No    [] Yes (see summary sheet for update)  Subjective functional status/changes:   [] No changes reported  The pt reports no adverse response after last session, she did some gardening today \"in the wrong shoes\" so she's a little sore    OBJECTIVE    Modality rationale: decrease inflammation and decrease pain to improve the patients ability to perform ADLs with less pain   Min Type Additional Details    [] Estim:  []Unatt       []IFC  []Premod                        []Other:  []w/ice   []w/heat  Position:  Location:    [] Estim: []Att    []TENS instruct  []NMES                    []Other:  []w/US   []w/ice   []w/heat  Position:  Location:    []  Traction: [] Cervical       []Lumbar                       [] Prone          []Supine                       []Intermittent   []Continuous Lbs:  [] before manual  [] after manual    []  Ultrasound: []Continuous   [] Pulsed                           []1MHz   []3MHz W/cm2:  Location:    []  Iontophoresis with dexamethasone         Location: [] Take home patch   [] In clinic    []  Ice     []  heat  []  Ice massage  []  Laser   []  Anodyne Position:  Location:    []  Laser with stim  []  Other:  Position:  Location:   10 [x]  Vasopneumatic Device Pressure:       [x] lo [] med [] hi   Temperature: [x] lo [] med [] hi   [] Skin assessment post-treatment:  []intact []redness- no adverse reaction    []redness  adverse reaction:         35 min Therapeutic Exercise:  [] See flow sheet :   Rationale: increase ROM and increase strength to improve the patients ability to perform ADLs with improved ease    8 min Neuromuscular Re-education:  []  See flow sheet :   Rationale: improve balance and increase proprioception  to improve the patients ability to perform functional tasks with improved stability            With   [] TE   [] TA   [] neuro   [] other: Patient Education: [x] Review HEP    [] Progressed/Changed HEP based on:   [] positioning   [] body mechanics   [] transfers   [] heat/ice application    [] other:      Other Objective/Functional Measures: progressed repetitions with some standing therex today     Pain Level (0-10 scale) post treatment: 0    ASSESSMENT/Changes in Function: Pt is doing well with regards to mobility and strength. Will plan to progress with more closed chain dynamic strength next week, held some today due to back to back visits. Patient will continue to benefit from skilled PT services to modify and progress therapeutic interventions, address functional mobility deficits, address ROM deficits, address strength deficits, analyze and address soft tissue restrictions, analyze and cue movement patterns and analyze and modify body mechanics/ergonomics to attain remaining goals. []  See Plan of Care  []  See progress note/recertification  []  See Discharge Summary         Progress towards goals / Updated goals:  Short Term Goals: To be accomplished in 2 weeks:               1. The patient will demonstrate independence and compliance with HEP to maximize therapeutic benefit.              IE: issued HEP              VHEJUOZ: Met 9/01/2020               2. The patient will improve left knee flexion to 150 degrees to improve ease of kneeling.              IE: 130 degrees left knee              Current: Improved to 146 degrees 9/09/2020   Long Term Goals: To be accomplished in 4 weeks:               1.  The patient will improve FOTO score to 61 to maximize ease of functional activity.             XW: 29               9. The patient will improve hip ABD/EXT strength to 4+/5 MMT to maximize stability In stance.              IE: 4/5 MMT Hip extension/ABD              Current: partially met 4+/5 abd  4/5 ext 9/15/2020               3. The patient will demonstrate equal stair negotiation without compensations or HR to resume PLOF.             OR: step to, or with compensations at IE.               4.  The patient will report a little bit of difficulty with usual housework or school activities to improve ease of chore production.              IE: moderate difficulty    PLAN  [x]  Upgrade activities as tolerated     []  Continue plan of care  []  Update interventions per flow sheet       []  Discharge due to:_  []  Other:_      Alexandra Vinson DPT, CMTPT 9/16/2020  1:05 PM    Future Appointments   Date Time Provider Paulette Elisha   9/21/2020 12:45 [facility]   9/23/2020  1:45 PM Harris, [address] [facility]   10/13/2020 10:40 AM Rhys Mcmillan MD [facility] 69

## 2020-09-18 ENCOUNTER — APPOINTMENT (OUTPATIENT)
Dept: PHYSICAL THERAPY | Age: 65
End: 2020-09-18
Payer: COMMERCIAL

## 2020-09-21 ENCOUNTER — HOSPITAL ENCOUNTER (OUTPATIENT)
Dept: PHYSICAL THERAPY | Age: 65
Discharge: HOME | End: 2020-09-21
Payer: COMMERCIAL

## 2020-09-21 PROCEDURE — 97110 THERAPEUTIC EXERCISES: CPT

## 2020-09-21 PROCEDURE — 97112 NEUROMUSCULAR REEDUCATION: CPT

## 2020-09-21 NOTE — PROGRESS NOTES
PT DAILY TREATMENT NOTE 10-18    Patient Name: Yahaira Farah  Date:2020  : 1955  [x]  Patient  Verified  Payor: BLUE CROSS / Plan: [address] [place] [address] / Product Type: PPO /     In time:1:00  Out time:1:42  Total Treatment Time (min): 42  Visit #: 7 of 8    Medicare/BCBS Only   Total Timed Codes (min):  32 1:1 Treatment Time:  32       Treatment Area: Left knee pain [M25.562]    SUBJECTIVE  Pain Level (0-10 scale): 1/10  Any medication changes, allergies to medications, adverse drug reactions, diagnosis change, or new procedure performed?: [x] No    [] Yes (see summary sheet for update)  Subjective functional status/changes:   [] No changes reported  The patient reports that her knee was sore over the weekend but that she is feeling a little better today. OBJECTIVE     Modality rationale: decrease edema, decrease inflammation and decrease pain to improve the patients ability to improve ADL ease. Min Type Additional Details   10 [x]  Vasopneumatic Device Pressure:       [x] lo [] med [] hi   Temperature: [x] lo [] med [] hi   [] Skin assessment post-treatment:  []intact []redness- no adverse reaction    []redness  adverse reaction:     22 min Therapeutic Exercise:  [x] See flow sheet :   Rationale: increase ROM and increase strength to improve the patients ability to improve ADL ease. 10 min Neuromuscular Re-education:  [x]  See flow sheet :   Rationale: improve coordination, improve balance and increase proprioception  to improve the patients ability to improve ADL ease. With   [] TE   [] TA   [] neuro   [] other: Patient Education: [x] Review HEP    [] Progressed/Changed HEP based on:   [] positioning   [] body mechanics   [] transfers   [] heat/ice application    [] other:      Other Objective/Functional Measures:  Able to add 4\" step downs with fairly good control. Notable quad fatigue, but improving quad strength.      Pain Level (0-10 scale) post treatment: 0/10    ASSESSMENT/Changes in Function: Progressing well with quad strength, though does report that she has held off of walking and avoided uneven surfaces. Patient will continue to benefit from skilled PT services to modify and progress therapeutic interventions, address functional mobility deficits, address ROM deficits, address strength deficits, analyze and address soft tissue restrictions, analyze and cue movement patterns, analyze and modify body mechanics/ergonomics, assess and modify postural abnormalities and instruct in home and community integration to attain remaining goals. []  See Plan of Care  []  See progress note/recertification  []  See Discharge Summary         Progress towards goals / Updated goals:  Short Term Goals: To be accomplished in 2 weeks:               1. The patient will demonstrate independence and compliance with HEP to maximize therapeutic benefit.              IE: issued HEP              [id number]: Met 9/01/2020               2. The patient will improve left knee flexion to 150 degrees to improve ease of kneeling.              IE: 130 degrees left knee              Current: Improved to 146 degrees 9/09/2020   Long Term Goals: To be accomplished in 4 weeks:               1. The patient will improve FOTO score to 61 to maximize ease of functional activity.             KP: 21               1. The patient will improve hip ABD/EXT strength to 4+/5 MMT to maximize stability In stance.              IE: 4/5 MMT Hip extension/ABD              Current: partially met 4+/5 abd  4/5 ext 9/15/2020               3. The patient will demonstrate equal stair negotiation without compensations or HR to resume PLOF.             ZX: step to, or with compensations at IE.               4.  The patient will report a little bit of difficulty with usual housework or school activities to improve ease of chore production.              IE: moderate difficulty       PLAN  []  Upgrade activities as tolerated     [x]  Continue plan of care  []  Update interventions per flow sheet       []  Discharge due to:_  []  Other:_      Hari Elmore, PT 9/21/2020  1:23 PM    Future Appointments   Date Time Provider Paulette Tello   9/23/2020  1:45 PM [facility]   10/13/2020 10:40 AM Emily Yeager MD [facility]

## 2020-09-23 ENCOUNTER — HOSPITAL ENCOUNTER (OUTPATIENT)
Dept: PHYSICAL THERAPY | Age: 65
Discharge: HOME | End: 2020-09-23
Payer: COMMERCIAL

## 2020-09-23 PROCEDURE — 97110 THERAPEUTIC EXERCISES: CPT

## 2020-09-23 PROCEDURE — 97112 NEUROMUSCULAR REEDUCATION: CPT

## 2020-09-23 NOTE — PROGRESS NOTES
PT DAILY TREATMENT NOTE 10-18    Patient Name: James Mcfadden  Date:2020  : 1955  [x]  Patient  Verified  Payor: BLUE CROSS / Plan: [address] [place] [address] / Product Type: PPO /    In time:1:45  Out time:2:38  Total Treatment Time (min): 53  Visit #: 8 of 8    Medicare/BCBS Only   Total Timed Codes (min):  43 1:1 Treatment Time:  43       Treatment Area: Left knee pain [M25.562]    SUBJECTIVE  Pain Level (0-10 scale):  2  Any medication changes, allergies to medications, adverse drug reactions, diagnosis change, or new procedure performed?: [x] No    [] Yes (see summary sheet for update)  Subjective functional status/changes:   [] No changes reported  The pt reports she walked the dog yesterday and is a little sore today but not bad     OBJECTIVE    Modality rationale: decrease inflammation and decrease pain to improve the patients ability to perform ADLs with less soreness   Min Type Additional Details    [] Estim:  []Unatt       []IFC  []Premod                        []Other:  []w/ice   []w/heat  Position:  Location:    [] Estim: []Att    []TENS instruct  []NMES                    []Other:  []w/US   []w/ice   []w/heat  Position:  Location:    []  Traction: [] Cervical       []Lumbar                       [] Prone          []Supine                       []Intermittent   []Continuous Lbs:  [] before manual  [] after manual    []  Ultrasound: []Continuous   [] Pulsed                           []1MHz   []3MHz W/cm2:  Location:    []  Iontophoresis with dexamethasone         Location: [] Take home patch   [] In clinic    []  Ice     []  heat  []  Ice massage  []  Laser   []  Anodyne Position:  Location:    []  Laser with stim  []  Other:  Position:  Location:   10 [x]  Vasopneumatic Device Pressure:       [x] lo [] med [] hi   Temperature: [x] lo [] med [] hi   [] Skin assessment post-treatment:  []intact []redness- no adverse reaction    []redness  adverse reaction:       35 min Therapeutic Exercise:  [] See flow sheet :   Rationale: increase ROM and increase strength to improve the patients ability to perform daily tasks with increased ease     8 min Neuromuscular Re-education:  []  See flow sheet :   Rationale: improve coordination and increase proprioception  to improve the patients ability to perform functional tasks with improved quad stability and proprioception in standing      With   [] TE   [] TA   [] neuro   [] other: Patient Education: [x] Review HEP    [] Progressed/Changed HEP based on:   [] positioning   [] body mechanics   [] transfers   [] heat/ice application    [] other:      Other Objective/Functional Measures: pt with improved control on dynamic step downs today     Pain Level (0-10 scale) post treatment: 0    ASSESSMENT/Changes in Function: The pt has progressed quite well since [facility] regarding mobility and strength. She would benefit from brief continuance to improve closed chain unilateral stability for progression back to previous activity level    Patient will continue to benefit from skilled PT services to modify and progress therapeutic interventions, address functional mobility deficits, address ROM deficits, address strength deficits, analyze and address soft tissue restrictions, analyze and cue movement patterns and analyze and modify body mechanics/ergonomics to attain remaining goals. []  See Plan of Care  [x]  See progress note/recertification  []  See Discharge Summary         Progress towards goals / Updated goals:  Short Term Goals: To be accomplished in 2 weeks:               1. The patient will demonstrate independence and compliance with HEP to maximize therapeutic benefit.              IE: issued HEP              BGWCPTI: Met 9/01/2020               2.  The patient will improve left knee flexion to 150 degrees to improve ease of kneeling.              IE: 130 degrees left knee              Current: Improved to 146 degrees 9/09/2020   Long Term Goals: To be accomplished in 4 weeks:               1. The patient will improve FOTO score to 61 to maximize ease of functional activity.             FX: 99   Current: Met 61 9/23/2020               2. The patient will improve hip ABD/EXT strength to 4+/5 MMT to maximize stability In stance.              IE: 4/5 MMT Hip extension/ABD              Current: partially met 4+/5 abd  4/5 ext 9/15/2020               3. The patient will demonstrate equal stair negotiation without compensations or HR to resume PLOF.             XF: step to, or with compensations at IE. Current: Met reciprocal stair negotiation without issue or compensation 9/23/2020               4.  The patient will report a little bit of difficulty with usual housework or school activities to improve ease of chore production.              IE: moderate difficulty   Current: Met no difficulty reported 9/23/2020    PLAN  [x]  Upgrade activities as tolerated     []  Continue plan of care  []  Update interventions per flow sheet       []  Discharge due to:_  []  Other:_      Glenice Counter DPT CMTPT 9/23/2020  2:06 PM    Future Appointments   Date Time Provider Paulette Tello   10/13/2020 10:40 AM David Alvarez MD [facility]

## 2020-09-23 NOTE — PROGRESS NOTES
[facility]  [address]  [place], [address]  [phone] fax    Physical Therapy Progress Note  Patient name: Kit Nath Start of Care: 2020   Referral source: Adithya Padron,* : 1955                Medical Diagnosis: Other tear of medial meniscus, current injury, left knee, initial encounter [S83.242A]  Payor: [employer] / Plan: [address] [place] [address] / Product Type: PPO /  Onset Date:2020                Treatment Diagnosis: Left knee pain   Prior Hospitalization: see medical history Provider#: [id number]   Medications: Verified on Patient summary List    Comorbidities: Depression, Osteoporosis, meniscus tear, HTN, pemphigus vulgaris autoimmune disease   Prior Level of Function: The patient reports that she had pain with walking and standing prior to surgery. Visits from Start of Care: 8    Missed Visits: 0      Established Goals:        Excellent         Good         Limited            None  [x] Increased ROM   [x]  []  []  []  [x] Increased Strength  []  [x]  []  []  [] Increased Mobility  []  []  []  []   [x] Decreased Pain   []  [x]  []  []  [] Decreased Swelling  []  []  []  []    Key Functional Changes:   Short Term Goals: To be accomplished in 2 weeks:               1. The patient will demonstrate independence and compliance with HEP to maximize therapeutic benefit.              IE: issued HEP              QFEHILG: Met 2020               2. The patient will improve left knee flexion to 150 degrees to improve ease of kneeling.              IE: 130 degrees left knee              Current: Improved to 146 degrees 2020   Long Term Goals: To be accomplished in 4 weeks:               1. The patient will improve FOTO score to 61 to maximize ease of functional activity.             NP: 34              Current: Met 61 2020               2.  The patient will improve hip ABD/EXT strength to 4+/5 MMT to maximize stability In stance.              IE: 4/5 MMT Hip extension/ABD              Current: partially met 4+/5 abd  4/5 ext 9/15/2020               3. The patient will demonstrate equal stair negotiation without compensations or HR to resume PLOF.             PW: step to, or with compensations at IE. Current: Met reciprocal stair negotiation without issue or compensation 9/23/2020               4. The patient will report a little bit of difficulty with usual housework or school activities to improve ease of chore production.              IE: moderate difficulty              Current: Met no difficulty reported 9/23/2020    Updated Goals: to be achieved in 2-3 weeks:  1. The patient will improve hip ABD/EXT strength to 4+/5 MMT to maximize stability In stance. PN: partially met 4+/5 abd  4/5 ext  2. Pt will demonstrate 10 step downs from 6\" step without compensation or pain to improve unilateral quad stability with recreation. PN: 4\" step downs with good form    ASSESSMENT/RECOMMENDATIONS: The pt has progressed quite well since [facility] regarding mobility and strength.  She would benefit from brief continuance to improve closed chain unilateral stability for progression back to previous activity level    [x]Continue therapy per initial plan/protocol at a frequency of  2 x per week for 2-3 weeks  []Continue therapy with the following recommended changes:_____________________      _____________________________________________________________________  []Discontinue therapy progressing towards or have reached established goals  []Discontinue therapy due to lack of appreciable progress towards goals  []Discontinue therapy due to lack of attendance or compliance  []Await Physician's recommendations/decisions regarding therapy  []Other:________________________________________________________________    Thank you for this referral.    Laura Flower DPT CMTPT 9/23/2020 2:30 PM  NOTE TO PHYSICIAN:  PLEASE COMPLETE THE ORDERS BELOW AND   FAX TO [facility]: [phone]  If you are unable to process this request in 24 hours please contact our office: [phone] I have read the above report and request that my patient continue as recommended. ? I have read the above report and request that my patient continue therapy with the following changes/special instructions:__________________________________________________________  ? I have read the above report and request that my patient be discharged from therapy.     Physicians signature: ______________________________Date: ______Time:______

## 2020-09-30 ENCOUNTER — HOSPITAL ENCOUNTER (OUTPATIENT)
Dept: PHYSICAL THERAPY | Age: 65
Discharge: HOME | End: 2020-09-30
Payer: COMMERCIAL

## 2020-09-30 PROCEDURE — 97110 THERAPEUTIC EXERCISES: CPT

## 2020-09-30 PROCEDURE — 97112 NEUROMUSCULAR REEDUCATION: CPT

## 2020-09-30 NOTE — PROGRESS NOTES
PT DAILY TREATMENT NOTE 10-18    Patient Name: Deann Sanz  Date:2020  : 1955  [x]  Patient  Verified  Payor: BLUE CROSS / Plan: [address] [place] [address] / Product Type: PPO /    In time:1:47  Out time:2:42  Total Treatment Time (min): 54  Visit #: 1 of 4-6    Medicare/BCBS Only   Total Timed Codes (min):  45 1:1 Treatment Time:  45       Treatment Area: Left knee pain [M25.562]    SUBJECTIVE  Pain Level (0-10 scale): 1  Any medication changes, allergies to medications, adverse drug reactions, diagnosis change, or new procedure performed?: [x] No    [] Yes (see summary sheet for update)  Subjective functional status/changes:   [] No changes reported  The pt reports she is a little sore with the weather today    OBJECTIVE    Modality rationale: decrease inflammation and decrease pain to improve the patients ability to perform ADLs with less pain and soreness   Min Type Additional Details    [] Estim:  []Unatt       []IFC  []Premod                        []Other:  []w/ice   []w/heat  Position:  Location:    [] Estim: []Att    []TENS instruct  []NMES                    []Other:  []w/US   []w/ice   []w/heat  Position:  Location:    []  Traction: [] Cervical       []Lumbar                       [] Prone          []Supine                       []Intermittent   []Continuous Lbs:  [] before manual  [] after manual    []  Ultrasound: []Continuous   [] Pulsed                           []1MHz   []3MHz W/cm2:  Location:    []  Iontophoresis with dexamethasone         Location: [] Take home patch   [] In clinic    []  Ice     []  heat  []  Ice massage  []  Laser   []  Anodyne Position:  Location:    []  Laser with stim  []  Other:  Position:  Location:   10 [x]  Vasopneumatic Device Pressure:       [x] lo [] med [] hi   Temperature: [x] lo [] med [] hi   [] Skin assessment post-treatment:  []intact []redness- no adverse reaction    []redness  adverse reaction:       35 min Therapeutic Exercise:  [] See flow sheet :   Rationale: increase ROM and increase strength to improve the patients ability to improve ease of ADL performance     10 min Neuromuscular Re-education:  []  See flow sheet :   Rationale: improve coordination and increase proprioception  to improve the patients ability to return to walking and recreation without limitations    With   [] TE   [] TA   [] neuro   [] other: Patient Education: [x] Review HEP    [] Progressed/Changed HEP based on:   [] positioning   [] body mechanics   [] transfers   [] heat/ice application    [] other:      Other Objective/Functional Measures: pt challenged with progression of squats to reverse BOSU      Pain Level (0-10 scale) post treatment: 0    ASSESSMENT/Changes in Function: The pt demonstrates progressing stability with therex. She does report some discomfort with step downs from 4\" step, unsure if this is muscular or pain as pt does not report adverse response during exercise. Continue with stability and strength progression towards return to recreation    Patient will continue to benefit from skilled PT services to modify and progress therapeutic interventions, address functional mobility deficits, address ROM deficits, address strength deficits, analyze and address soft tissue restrictions, analyze and cue movement patterns and analyze and modify body mechanics/ergonomics to attain remaining goals. []  See Plan of Care  []  See progress note/recertification  []  See Discharge Summary         Progress towards goals / Updated goals:  Updated Goals: to be achieved in 2-3 weeks:  1. The patient will improve hip ABD/EXT strength to 4+/5 MMT to maximize stability In stance. PN: partially met 4+/5 abd  4/5 ext  2. Pt will demonstrate 10 step downs from 6\" step without compensation or pain to improve unilateral quad stability with recreation.   PN: 4\" step downs with good form    PLAN  [x]  Upgrade activities as tolerated     []  Continue plan of care  []  Update interventions per flow sheet       []  Discharge due to:_  []  Other:_      Juan Pablo Jeffrey DPT, CMTPT  9/30/2020  1:53 PM    Future Appointments   Date Time Provider Paulette Tello   10/2/2020  1:45 PM [facility]   10/5[facility]2020  1:30 PM [place] [facility]  [facility]   10/13/2020 10:40 AM Noel Miramontes MD [facility]

## 2020-10-02 ENCOUNTER — HOSPITAL ENCOUNTER (OUTPATIENT)
Dept: PHYSICAL THERAPY | Age: 65
Discharge: HOME | End: 2020-10-02
Payer: COMMERCIAL

## 2020-10-02 PROCEDURE — 97110 THERAPEUTIC EXERCISES: CPT

## 2020-10-02 PROCEDURE — 97140 MANUAL THERAPY 1/> REGIONS: CPT

## 2020-10-02 NOTE — PROGRESS NOTES
PT DAILY TREATMENT NOTE 10-18    Patient Name: Yanira Urrutia  Date:10/2/2020  : 1955  [x]  Patient  Verified  Payor: Rock Best / Plan: [address] [place] [address] / Product Type: PPO /    In time:1:47  Out time:2:36  Total Treatment Time (min): 52  Visit #: 2 of 4-6    Medicare/BCBS Only   Total Timed Codes (min):  49 1:1 Treatment Time:  49       Treatment Area: Left knee pain [M25.562]    SUBJECTIVE  Pain Level (0-10 scale): 1-210  Any medication changes, allergies to medications, adverse drug reactions, diagnosis change, or new procedure performed?: [x] No    [] Yes (see summary sheet for update)  Subjective functional status/changes:   [] No changes reported  The patient indicates that her knee had gotten sore from her walks. OBJECTIVE  34 min Therapeutic Exercise:  [x] See flow sheet :   Rationale: increase ROM and increase strength to improve the patients ability to improve ADL ease. 15 min Neuromuscular Re-education:  [x]  See flow sheet :   Rationale: increase ROM and increase strength  to improve the patients ability to improve ADL ease. Modality rationale: decrease edema, decrease inflammation and decrease pain to improve the patients ability to improve ADL ease. Min Type Additional Details   10 [x]  Vasopneumatic Device Pressure:       [x] lo [] med [] hi   Temperature: [x] lo [] med [] hi   [] Skin assessment post-treatment:  []intact []redness- no adverse reaction    []redness  adverse reaction:     With   [] TE   [] TA   [] neuro   [] other: Patient Education: [x] Review HEP    [] Progressed/Changed HEP based on:   [] positioning   [] body mechanics   [] transfers   [] heat/ice application    [] other:      Other Objective/Functional Measures:   Improving quad stability noted with step downs, but does demonstrate valgus tendency. No pain reported post bosu squats, but does note instability with this.      Pain Level (0-10 scale) post treatment: 0/10    ASSESSMENT/Changes in Function: The patient is able to progress back into her recreational walks. She has made good progress, but still note closed chain instability, though progressing with closed chain. Patient will continue to benefit from skilled PT services to modify and progress therapeutic interventions, address functional mobility deficits, address ROM deficits, address strength deficits, analyze and address soft tissue restrictions, analyze and cue movement patterns, analyze and modify body mechanics/ergonomics, assess and modify postural abnormalities and instruct in home and community integration to attain remaining goals. []  See Plan of Care  []  See progress note/recertification  []  See Discharge Summary         Progress towards goals / Updated goals:  Updated Goals: to be achieved in 2-3 weeks:  1. The patient will improve hip ABD/EXT strength to 4+/5 MMT to maximize stability In stance. PN: partially met 4+/5 abd  4/5 ext  2. Pt will demonstrate 10 step downs from 6\" step without compensation or pain to improve unilateral quad stability with recreation. PN: 4\" step downs with good form  Current: 4\" steps downs.      PLAN  []  Upgrade activities as tolerated     [x]  Continue plan of care  []  Update interventions per flow sheet       []  Discharge due to:_  []  Other:_      Meghan Talley, PT 10/2/2020  2:19 PM    Future Appointments   Date Time Provider Paulette Tello   10/5/2020  1:30 PM Harris [address] [facility]   10/12/2020 11[facility]   10/13/2020 10:40 AM Renetta Anne MD [facility]

## 2020-10-05 ENCOUNTER — HOSPITAL ENCOUNTER (OUTPATIENT)
Dept: PHYSICAL THERAPY | Age: 65
Discharge: HOME | End: 2020-10-05
Payer: COMMERCIAL

## 2020-10-05 PROCEDURE — 97110 THERAPEUTIC EXERCISES: CPT

## 2020-10-05 PROCEDURE — 97112 NEUROMUSCULAR REEDUCATION: CPT

## 2020-10-05 NOTE — PROGRESS NOTES
PT DAILY TREATMENT NOTE 10-18    Patient Name: William Lou  Date:10/5/2020  : 1955  [x]  Patient  Verified  Payor: Meeta Martínez / Plan: [address] [place] [address] / Product Type: PPO /    In time:1:27  Out time:1:58  Total Treatment Time (min): 31  Visit #: 3 of 4-6    Medicare/BCBS Only   Total Timed Codes (min):  31 1:1 Treatment Time:  31       Treatment Area: Left knee pain [M25.562]    SUBJECTIVE  Pain Level (0-10 scale): 1-2  Any medication changes, allergies to medications, adverse drug reactions, diagnosis change, or new procedure performed?: [x] No    [] Yes (see summary sheet for update)  Subjective functional status/changes:   [] No changes reported  \"It hurts, I crossed my legs at this class on Saturday and that was stupid.  I just thought it would be better now\"    OBJECTIVE    Modality rationale: PD to improve the patients ability to    Min Type Additional Details    [] Estim:  []Unatt       []IFC  []Premod                        []Other:  []w/ice   []w/heat  Position:  Location:    [] Estim: []Att    []TENS instruct  []NMES                    []Other:  []w/US   []w/ice   []w/heat  Position:  Location:    []  Traction: [] Cervical       []Lumbar                       [] Prone          []Supine                       []Intermittent   []Continuous Lbs:  [] before manual  [] after manual    []  Ultrasound: []Continuous   [] Pulsed                           []1MHz   []3MHz W/cm2:  Location:    []  Iontophoresis with dexamethasone         Location: [] Take home patch   [] In clinic    []  Ice     []  heat  []  Ice massage  []  Laser   []  Anodyne Position:  Location:    []  Laser with stim  []  Other:  Position:  Location:    []  Vasopneumatic Device Pressure:       [] lo [] med [] hi   Temperature: [] lo [] med [] hi   [] Skin assessment post-treatment:  []intact []redness- no adverse reaction    []redness  adverse reaction:       23 min Therapeutic Exercise:  [] See flow sheet : Rationale: increase ROM and increase strength to improve the patients ability to improved ADLs and recreation      8 min Neuromuscular Re-education:  []  See flow sheet :   Rationale: improve coordination and increase proprioception  to improve the patients ability to perform functional tasks with improved unilateral stability and motor control     With   [] TE   [] TA   [] neuro   [] other: Patient Education: [x] Review HEP    [] Progressed/Changed HEP based on:   [] positioning   [] body mechanics   [] transfers   [] heat/ice application    [] other:      Other Objective/Functional Measures: improved stability with BOSU squats today     Pain Level (0-10 scale) post treatment: 1    ASSESSMENT/Changes in Function: The pt demonstrates good form with all therex at this time, some challenge with motor control on higher level exercises. Will plan to D/C at next visit to HEP management, as pt is planning to pursue treatment for her shoulder. Patient will continue to benefit from skilled PT services to modify and progress therapeutic interventions, address functional mobility deficits, address ROM deficits, address strength deficits, analyze and address soft tissue restrictions, analyze and cue movement patterns and analyze and modify body mechanics/ergonomics to attain remaining goals. []  See Plan of Care  []  See progress note/recertification  []  See Discharge Summary         Progress towards goals / Updated goals:  Updated Goals: to be achieved in 2-3 weeks:  1. The patient will improve hip ABD/EXT strength to 4+/5 MMT to maximize stability In stance. PN: partially met 4+/5 abd  4/5 ext  2. Pt will demonstrate 10 step downs from 6\" step without compensation or pain to improve unilateral quad stability with recreation. PN: 4\" step downs with good form  Current: 4\" steps downs.      PLAN  []  Upgrade activities as tolerated     [x]  Continue plan of care  []  Update interventions per flow sheet       [] Discharge due to:_  []  Other:_      Roosevelt Holstein DPT CMTPT 10/5/2020  1:29 PM    Future Appointments   Date Time Provider Paulette Elisha   10/5/2020  1:30 [facility]   10/12/2020 11:15 AM [facility]   10/13/2020 10:40 AM Rom Solitario MD [facility]

## 2020-10-07 ENCOUNTER — APPOINTMENT (OUTPATIENT)
Dept: PHYSICAL THERAPY | Age: 65
End: 2020-10-07
Payer: COMMERCIAL

## 2020-10-12 ENCOUNTER — HOSPITAL ENCOUNTER (OUTPATIENT)
Dept: PHYSICAL THERAPY | Age: 65
Discharge: HOME | End: 2020-10-12
Payer: COMMERCIAL

## 2020-10-12 PROCEDURE — 97110 THERAPEUTIC EXERCISES: CPT

## 2020-10-12 PROCEDURE — 97112 NEUROMUSCULAR REEDUCATION: CPT

## 2020-10-12 PROCEDURE — 97535 SELF CARE MNGMENT TRAINING: CPT

## 2020-10-12 NOTE — PROGRESS NOTES
PT DISCHARGE DAILY NOTE AND [id number]  Patient name: Betzy Hoover Start of Care: 2020   Referral source: Zander Wilcox,* TRAVIS:                 Medical Diagnosis: Other tear of medial meniscus, current injury, left knee, initial encounter [S83.242A]  Payor: AFDI FORBES / Plan: [employer] / Product Type: PPO /  Onset Date:2020                Treatment Diagnosis: Left knee pain   Prior Hospitalization: see medical history Provider#: [id number]   Medications: Verified on Patient summary List    Comorbidities: Depression, Osteoporosis, meniscus tear, HTN, pemphigus vulgaris autoimmune disease   Prior Level of Function: The patient reports that she had pain with walking and standing prior to surgery.   Visits from Start of Care: 12    Missed Visits: 0    Reporting Period : 2020 to 10/12/2020    Date:10/12/2020  : 1955  [x]  Patient  Verified  Payor: Lopez Lopez / Plan: [employer] / Product Type: PPO /    In time:11:19  Out time:11:58  Total Treatment Time (min): 39  Visit #: 4 of 4-6    Medicare/BCBS Only   Total Timed Codes (min):  39 1:1 Treatment Time:  39       SUBJECTIVE  Pain Level (0-10 scale): 0  Any medication changes, allergies to medications, adverse drug reactions, diagnosis change, or new procedure performed?: [x] No    [] Yes (see summary sheet for update)  Subjective functional status/changes:   [] No changes reported  \"Some stiffness not pain\"    OBJECTIVE    Modality rationale: PD to improve the patients ability to    Min Type Additional Details    [] Estim:  []Unatt       []IFC  []Premod                        []Other:  []w/ice   []w/heat  Position:  Location:    [] Estim: []Att    []TENS instruct  []NMES                    []Other:  []w/US   []w/ice   []w/heat  Position:  Location:    []  Traction: [] Cervical       []Lumbar                       [] Prone          []Supine                       []Intermittent   []Continuous Lbs:  [] before manual  [] after manual    []  Ultrasound: []Continuous   [] Pulsed                           []1MHz   []3MHz W/cm2:  Location:    []  Iontophoresis with dexamethasone         Location: [] Take home patch   [] In clinic    []  Ice     []  heat  []  Ice massage  []  Laser   []  Anodyne Position:  Location:    []  Laser with stim  []  Other:  Position:  Location:    []  Vasopneumatic Device Pressure:       [] lo [] med [] hi   Temperature: [] lo [] med [] hi   [] Skin assessment post-treatment:  []intact []redness- no adverse reaction    []redness  adverse reaction:         23 min Therapeutic Exercise:  [] See flow sheet :   Rationale: increase ROM and increase strength to improve the patients ability to improve ease of ADLs and recreation    8 min Therapeutic Activity:  []  See flow sheet :   Rationale: self care and pt education  to improve the patients ability to self manage and progress with HEP     8 min Neuromuscular Re-education:  []  See flow sheet :   Rationale: improve balance and increase proprioception  to improve the patients ability to perform functional tasks and return to workouts with good LE stability            With   [] TE   [] TA   [] neuro   [] other: Patient Education: [x] Review HEP    [] Progressed/Changed HEP based on:   [] positioning   [] body mechanics   [] transfers   [] heat/ice application    [] other:      Other Objective/Functional Measures: updated HEP provided today for further strength and stability work     Pain Level (0-10 scale) post treatment: 0    Summary of Care:  Updated Goals: to be achieved in 2-3 weeks:  1. The patient will improve hip ABD/EXT strength to 4+/5 MMT to maximize stability In stance. PN: partially met 4+/5 abd  4/5 ext  Current: Met 4+/5 each 10/12/2020  2. Pt will demonstrate 10 step downs from 6\" step without compensation or pain to improve unilateral quad stability with recreation.   PN: 4\" step downs with good form  Current: near met able to perform 5 reps at 6\" with good overall form, some challenge with hip ER stability 10/12/2020    ASSESSMENT/Changes in Function: The pt has progressed well with regards to knee mobility and strength. She does report having a bit of trouble self limiting her activity and thus gets a little sore. She was provided updated HEP for guidance on progression of strength work.  Will D/C at this time    Thank you for this referral!      PLAN  [x]Discontinue therapy: [x]Patient has reached or is progressing toward set goals      []Patient is non-compliant or has abdicated      []Due to lack of appreciable progress towards set goals    Maddy HUTCHISONT, CMTPT 10/12/2020  11:24 AM

## 2020-10-26 ENCOUNTER — HOSPITAL ENCOUNTER (OUTPATIENT)
Dept: PHYSICAL THERAPY | Age: 65
Discharge: HOME | End: 2020-10-26
Payer: COMMERCIAL

## 2020-10-26 PROCEDURE — 97110 THERAPEUTIC EXERCISES: CPT

## 2020-10-26 PROCEDURE — 97161 PT EVAL LOW COMPLEX 20 MIN: CPT

## 2020-10-26 PROCEDURE — 97535 SELF CARE MNGMENT TRAINING: CPT

## 2020-10-26 NOTE — PROGRESS NOTES
PT DAILY TREATMENT NOTE 10-18    Patient Name: Rekha Given  Date:10/26/2020  : 1955  [x]  Patient  Verified  Payor: Brooklyn Feliz / Plan: [address] [place] [address] / Product Type: PPO /    In time:3:47  Out time:4:25  Total Treatment Time (min): 38  Visit #: 1 of 8    Medicare/BCBS Only   Total Timed Codes (min):  23 1:1 Treatment Time:  38       Treatment Area: Right shoulder pain [M25.511]    SUBJECTIVE  Pain Level (0-10 scale): 0 at rest  Any medication changes, allergies to medications, adverse drug reactions, diagnosis change, or new procedure performed?: [x] No    [] Yes (see summary sheet for update)  Subjective functional status/changes:   [] No changes reported  Pt reports intermittent shoulder pain mostly with OH reaching or increased activity with resistance (vacuuming)     OBJECTIVE    15 min [x]Eval                  []Re-Eval       10 min Therapeutic Exercise:  [] See flow sheet :   Rationale: increase ROM and improve coordination to improve the patients ability to perform daily tasks with less pain    13 min Therapeutic Activity:  []  See flow sheet :   Rationale: self care and pt education  to improve the patients ability to self manage pain and maximize therapeutic effect         With   [] TE   [] TA   [] neuro   [] other: Patient Education: [x] Review HEP    [] Progressed/Changed HEP based on:   [] positioning   [] body mechanics   [] transfers   [] heat/ice application    [] other:      Other Objective/Functional Measures:      Pain Level (0-10 scale) post treatment: 0    ASSESSMENT/Changes in Function: see POC    Patient will continue to benefit from skilled PT services to modify and progress therapeutic interventions, address functional mobility deficits, address ROM deficits, address strength deficits, analyze and address soft tissue restrictions, analyze and cue movement patterns and analyze and modify body mechanics/ergonomics to attain remaining goals.      [x]  See Plan of Care  []  See progress note/recertification  []  See Discharge Summary         Progress towards goals / Updated goals:  Short Term Goals: To be accomplished in 1 weeks:  1. Pt will demonstrate I and compliance with HEP to maximize therapeutic effect. IE: HEP issued and instructed  Long Term Goals: To be accomplished in 4 weeks:  1. Pt will demonstrate 150 deg right shoulder flexion and abduction without pain to improve ease of ADLs. IE: 150 deg and 130 deg with pain at end range  2. Pt will demonstrate 5/5 scapular strength for improved shoulder mechanics with ADLs. IE: 4 to 4+/5   3. Pt will demonstrate 5/5 right shoulder ER strength without pain for improved RTC stability with exercise. IE: 4+/5 with pain  4. Pt will demonstrate 2# full can to 120 deg without pain for improved ability to reach overhead shelf.    IE: unable to lift to overhead shelf    PLAN  []  Upgrade activities as tolerated     []  Continue plan of care  []  Update interventions per flow sheet       []  Discharge due to:_  []  Other:_      Darvin Soler DPT CMTPT 10/26/2020  5:39 PM    Future Appointments   Date Time Provider Paulette Tello   10/30/2020 12:00 PM Cheko IBARRA, PT [facility]   11/2/2020 11:15 AM Elin Wilson, PT [facility]   11/4/2020 11:30 AM Isael Frock [facility]   11/9/2020 12:00 PM Joseph Karina, PTA [facility]   11/13/2020 11:15 AM Isael Frock [facility]   11/16/2020 12:00 PM Joseph Folds, PTA [facility]   11/18/2020 11:30 AM Isael Frock [facility]

## 2020-10-26 NOTE — PROGRESS NOTES
[facility]  [address]  [place], [address]  [phone] fax    Plan of Care/ Statement of Necessity for Physical Therapy Services    Patient name: Skyla Contreras Start of Care: 10/26/2020   Referral source: Ny Dowell,* : 1955    Medical Diagnosis: Right shoulder pain [M25.511]  Payor: BLUE CROSS / Plan: [address] [place] [address] / Product Type: PPO /  Onset Date:10 years    Treatment Diagnosis: Right shoulder pain   Prior Hospitalization: see medical history Provider#: [id number]   Medications: Verified on Patient summary List    Comorbidities: spinal stenosis, autoimmune disorder, arthritis, depression, osteoporosis, HTN, thyroid problems   Prior Level of Function: Pt with limited overhead reaching and right UE use over past 10 years due to pain     The Plan of Care and following information is based on the information from the initial evaluation. Assessment/ key information: The pt is a 61 y/o F presenting with c/o right shoulder pain of chronic nature. She reports unsure of specific mechanism but states she used to work out harder than necessary. She reports proximal shoulder pain in UT region that limits overhead reaching and lifting overhead due to pain. She denies any UE paresthesias, she reports cervical stenosis hx. Pt demonstrates good overall shoulder mobility with pain at end range shoulder elevation, a bit limited into IR due to discomfort. Good passive joint mobility noted today with only some 'discomfort' at end range elevation and IR. Good cervical mobility with some reports of UT region discomfort with left rotation and SB. Limited scapular strength and pain limited shoulder ER strength. Signs and symptoms consistent with mechanical shoulder pain and likely myofascial pain secondary to compensatory patterns. Pt would benefit from PT to improve ease of ADLs and self care.     Evaluation Complexity History MEDIUM  Complexity : 1-2 comorbidities / personal factors will impact the outcome/ POC ; Examination MEDIUM Complexity : 3 Standardized tests and measures addressing body structure, function, activity limitation and / or participation in recreation  ;Presentation LOW Complexity : Stable, uncomplicated  ;Clinical Decision Making MEDIUM Complexity : FOTO score of 26-74  Overall Complexity Rating: LOW   Problem List: pain affecting function, decrease ROM, decrease strength, decrease ADL/ functional abilitiies, decrease activity tolerance and decrease flexibility/ joint mobility   Treatment Plan may include any combination of the following: Therapeutic exercise, Therapeutic activities, Neuromuscular re-education, Physical agent/modality, Manual therapy, Patient education, Self Care training and Functional mobility training  Patient / Family readiness to learn indicated by: asking questions, trying to perform skills and interest  Persons(s) to be included in education: patient (P)  Barriers to Learning/Limitations: None  Patient Goal (s): Management of the pain  Patient Self Reported Health Status: fair  Rehabilitation Potential: excellent    Short Term Goals: To be accomplished in 1 weeks:  1. Pt will demonstrate I and compliance with HEP to maximize therapeutic effect. Long Term Goals: To be accomplished in 4 weeks:  1. Pt will demonstrate 150 deg right shoulder flexion and abduction without pain to improve ease of ADLs. 2. Pt will demonstrate 5/5 scapular strength for improved shoulder mechanics with ADLs. 3. Pt will demonstrate 5/5 right shoulder ER strength without pain for improved RTC stability with exercise. 4. Pt will demonstrate 2# full can to 120 deg without pain for improved ability to reach overhead shelf. Frequency / Duration: Patient to be seen 2 times per week for 4 weeks.     Patient/ Caregiver education and instruction: Diagnosis, prognosis, self care, activity modification and exercises   [x] Plan of care has been reviewed with KRYSTIAN HUTCHISONT, CMTPT 10/26/2020 5:19 PM    ________________________________________________________________________    I certify that the above Therapy Services are being furnished while the patient is under my care. I agree with the treatment plan and certify that this therapy is necessary.     Physician's Signature:____________Date:_________TIME:________    ** Signature, Date and Time must be completed for valid certification **    Please sign and return to [facility] [address]  [address]  [place], [facility] [address]  [phone] fax

## 2020-10-30 ENCOUNTER — HOSPITAL ENCOUNTER (OUTPATIENT)
Dept: PHYSICAL THERAPY | Age: 65
Discharge: HOME | End: 2020-10-30
Payer: COMMERCIAL

## 2020-10-30 PROCEDURE — 97112 NEUROMUSCULAR REEDUCATION: CPT

## 2020-10-30 PROCEDURE — 97110 THERAPEUTIC EXERCISES: CPT

## 2020-10-30 PROCEDURE — 97140 MANUAL THERAPY 1/> REGIONS: CPT

## 2020-10-30 NOTE — PROGRESS NOTES
PT DAILY TREATMENT NOTE 10-18    Patient Name: Kristin Celis  Date:10/30/2020  : 1955  [x]  Patient  Verified  Payor: BLUE CROSS / Plan: [address] [place] [address] / Product Type: PPO /    In time:1200  Out time:1248  Total Treatment Time (min): 48  Visit #: 2 of 8    Medicare/BCBS Only   Total Timed Codes (min):  48 1:1 Treatment Time:  48       Treatment Area: Right shoulder pain [M25.511]    SUBJECTIVE  Pain Level (0-10 scale): 0  Any medication changes, allergies to medications, adverse drug reactions, diagnosis change, or new procedure performed?: [x] No    [] Yes (see summary sheet for update)  Subjective functional status/changes:   [x] No changes reported      OBJECTIVE    Modality rationale: decrease pain to improve the patients ability to perform ALDs   Min Type Additional Details    [] Estim:  []Unatt       []IFC  []Premod                        []Other:  []w/ice   []w/heat  Position:  Location:   8 [x] Estim: [x]Att    []TENS instruct  []NMES                    []Other:  [x]w/US   []w/ice   []w/heat  Position:seated  Location:right UT    []  Traction: [] Cervical       []Lumbar                       [] Prone          []Supine                       []Intermittent   []Continuous Lbs:  [] before manual  [] after manual    []  Ultrasound: []Continuous   [] Pulsed                           []1MHz   []3MHz W/cm2:  Location:    []  Iontophoresis with dexamethasone         Location: [] Take home patch   [] In clinic    []  Ice     []  heat  []  Ice massage  []  Laser   []  Anodyne Position:  Location:    []  Laser with stim  []  Other:  Position:  Location:    []  Vasopneumatic Device Pressure:       [] lo [] med [] hi   Temperature: [] lo [] med [] hi   [] Skin assessment post-treatment:  []intact []redness- no adverse reaction    []redness  adverse reaction:       22 min Therapeutic Exercise:  [] See flow sheet :   Rationale: increase ROM and increase strength to improve the patients ability to perform daily activities      10 min Neuromuscular Re-education:  []  See flow sheet :   Rationale: increase strength  to improve the patients scapular stability     8 min Manual Therapy:  Grade 3/4 right ST/GhJ mobs/ PROM with distraction   Rationale: decrease pain, increase ROM and increase tissue extensibility to perform daily activities         With   [] TE   [] TA   [] neuro   [] other: Patient Education: [x] Review HEP    [] Progressed/Changed HEP based on:   [] positioning   [] body mechanics   [] transfers   [] heat/ice application    [] other:      Other Objective/Functional Measures:      Pain Level (0-10 scale) post treatment: 0    ASSESSMENT/Changes in Function: Cueing to maintain upper C/S flexion during QP exercises. Patient compensates and shifts weight and collapses into extension with QP shoulder flexion. Able to correct with cueing. Patient will continue to benefit from skilled PT services to modify and progress therapeutic interventions, address functional mobility deficits, address ROM deficits, address strength deficits, analyze and address soft tissue restrictions and analyze and cue movement patterns to attain remaining goals. []  See Plan of Care  []  See progress note/recertification  []  See Discharge Summary         Progress towards goals / Updated goals:  Short Term Goals: To be accomplished in 1 weeks:  1. Pt will demonstrate I and compliance with HEP to maximize therapeutic effect. IE: HEP issued and instructed   Current: goal met per patient 10/30/20  Long Term Goals: To be accomplished in 4 weeks:  1. Pt will demonstrate 150 deg right shoulder flexion and abduction without pain to improve ease of ADLs. IE: 150 deg and 130 deg with pain at end range  2. Pt will demonstrate 5/5 scapular strength for improved shoulder mechanics with ADLs. IE: 4 to 4+/5   3.  Pt will demonstrate 5/5 right shoulder ER strength without pain for improved RTC stability with exercise. IE: 4+/5 with pain  4. Pt will demonstrate 2# full can to 120 deg without pain for improved ability to reach overhead shelf.               IE: unable to lift to overhead shelf    PLAN  []  Upgrade activities as tolerated     [x]  Continue plan of care  []  Update interventions per flow sheet       []  Discharge due to:_  []  Other:_      Shyla Patrick, MPT, CMTPT 10/30/2020  8:43 AM    Future Appointments   Date Time Provider Paulette Hernandezi   10/30/2020 12:00 PM Alex IBARRA, PT [facility]   11/2/2020 11:15 AM Jacky Latif, PT [facility]   11/4/2020 11:30 AM Sherriangeline Zarate [facility]   11/9/2020 12:00 PM Ebony Lujan, PTA [facility]   11/13/2020 11:15 AM Sherri [place] [facility]   11/16/2020 12:00 PM Ebony Lujan, PTA [facility]   11/18/2020 11:30 AM Sherri [place] [facility]

## 2020-11-02 ENCOUNTER — HOSPITAL ENCOUNTER (OUTPATIENT)
Dept: PHYSICAL THERAPY | Age: 65
Discharge: HOME | End: 2020-11-02
Payer: COMMERCIAL

## 2020-11-02 PROCEDURE — 97112 NEUROMUSCULAR REEDUCATION: CPT

## 2020-11-02 PROCEDURE — 97110 THERAPEUTIC EXERCISES: CPT

## 2020-11-02 PROCEDURE — 97140 MANUAL THERAPY 1/> REGIONS: CPT

## 2020-11-02 NOTE — PROGRESS NOTES
PT DAILY TREATMENT NOTE     Patient Name: Britt Curling  Date:2020  : 1955  [x]  Patient  Verified  Payor: BLUE CROSS / Plan: [address] [place] [address] / Product Type: PPO /    In time:1125  Out time:1211  Total Treatment Time (min): 46  Visit #: 3 of 8    Medicare/BCBS Only   Total Timed Codes (min):  46 1:1 Treatment Time:  46       Treatment Area: Right shoulder pain [M25.511]    SUBJECTIVE  Pain Level (0-10 scale): 2/10  Any medication changes, allergies to medications, adverse drug reactions, diagnosis change, or new procedure performed?: [x] No    [] Yes (see summary sheet for update)  Subjective functional status/changes:   [] No changes reported  \" I was sore over the weekend\"    OBJECTIVE    Modality rationale: decrease inflammation, decrease pain and increase tissue extensibility to improve the patients ability to perform ADL's with ease.     Min Type Additional Details    [] Estim:  []Unatt       []IFC  []Premod                        []Other:  []w/ice   []w/heat  Position:  Location:   8 [x] Estim: [x]Att    []TENS instruct  []NMES                    []Other:  [x]w/US   []w/ice   []w/heat  Position:  Location:    []  Traction: [] Cervical       []Lumbar                       [] Prone          []Supine                       []Intermittent   []Continuous Lbs:  [] before manual  [] after manual    []  Ultrasound: []Continuous   [] Pulsed                           []1MHz   []3MHz W/cm2:  Location:    []  Iontophoresis with dexamethasone         Location: [] Take home patch   [] In clinic    []  Ice     []  heat  []  Ice massage  []  Laser   []  Anodyne Position:  Location:    []  Laser with stim  []  Other:  Position:  Location:    []  Vasopneumatic Device Pressure:       [] lo [] med [] hi   Temperature: [] lo [] med [] hi   [] Skin assessment post-treatment:  []intact []redness- no adverse reaction    []redness  adverse reaction:       20 min Therapeutic Exercise:  [x] See flow sheet :   Rationale: increase ROM and increase strength to improve the patients ability to perform ADL's with ease. 10 min Neuromuscular Re-education:  [x]  See flow sheet :   Rationale: increase proprioception and stability  to improve the patients ability to stabilize scapula with overhead motion. 8 min Manual Therapy:  STM to right levator, upper trap, Grade II C/T PA mob, Cervical distraction. The manual therapy interventions were performed at a separate and distinct time from the therapeutic activities interventions. Rationale: decrease pain, increase tissue extensibility, decrease trigger points and increase postural awareness to improve ease of ADL's. With   [x] TE   [] TA   [x] neuro   [] other: Patient Education: [x] Review HEP    [] Progressed/Changed HEP based on:   [] positioning   [] body mechanics   [] transfers   [] heat/ice application    [] other:      Other Objective/Functional Measures: Increased sternum elevation with standing posture. Pain Level (0-10 scale) post treatment: 0/10    ASSESSMENT/Changes in Function: Patient has fair postural awareness and decreased use of Low trap with right shoulder setting. Patient also has difficulty activating with right ER isometrics and using entire arm to achieve ER. Needing tactile cuing to execute exercise correctly. Patient will continue to benefit from skilled PT services to modify and progress therapeutic interventions, address functional mobility deficits, address ROM deficits, address strength deficits, analyze and address soft tissue restrictions, analyze and cue movement patterns and assess and modify postural abnormalities to attain remaining goals. [x]  See Plan of Care  []  See progress note/recertification  []  See Discharge Summary         Progress towards goals / Updated goals:  Short Term Goals: To be accomplished in 1 weeks:  1.  Pt will demonstrate I and compliance with HEP to maximize therapeutic effect.              MC: HEP issued and instructed              Current: goal met per patient 10/30/20  Long Term Goals: To be accomplished in 4 weeks:  1. Pt will demonstrate 150 deg right shoulder flexion and abduction without pain to improve ease of ADLs.             IE: 150 deg and 130 deg with pain at end range  2. Pt will demonstrate 5/5 scapular strength for improved shoulder mechanics with ADLs.             BT: 4 to 4+/5   3. Pt will demonstrate 5/5 right shoulder ER strength without pain for improved RTC stability with exercise.              IE: 4+/5 with pain  4.  Pt will demonstrate 2# full can to 120 deg without pain for improved ability to reach overhead shelf.              IE: unable to lift to overhead shelf    PLAN  []  Upgrade activities as tolerated     [x]  Continue plan of care  []  Update interventions per flow sheet       []  Discharge due to:_  []  Other:_      Dipika Manning, PT 11/2/2020  8:52 AM    Future Appointments   Date Time Provider [facility]   11/2/2020 11:15 AM Randolph Rosales, RORY [facility]   11/4/2020 11:30 AM Harris, [address] [facility]   11/9/2020 12:00 PM Josseline Whipple PTA [facility]   11/13/2020 11:15 AM Cedrick Hinojosa [facility]   11/16/2020 12:00 PM Josseline Whipple PTA [facility]   11/18/2020 11:30 AM Cedrick Hinojosa [facility]

## 2020-11-04 ENCOUNTER — HOSPITAL ENCOUNTER (OUTPATIENT)
Dept: PHYSICAL THERAPY | Age: 65
Discharge: HOME | End: 2020-11-04
Payer: COMMERCIAL

## 2020-11-04 PROCEDURE — 97112 NEUROMUSCULAR REEDUCATION: CPT

## 2020-11-04 PROCEDURE — 97110 THERAPEUTIC EXERCISES: CPT

## 2020-11-04 PROCEDURE — 97032 APPL MODALITY 1+ESTIM EA 15: CPT

## 2020-11-04 NOTE — PROGRESS NOTES
PT DAILY TREATMENT NOTE     Patient Name: Ledy Ibarra  Date:2020  : 1955  [x]  Patient  Verified  Payor: Aureliano Pike / Plan: [address] [place] [address] / Product Type: PPO /    In time:11:34  Out time:12:24  Total Treatment Time (min): 50  Visit #: 4 of 8    Medicare/BCBS Only   Total Timed Codes (min):  42 1:1 Treatment Time:  42       Treatment Area: Right shoulder pain [M25.511]    SUBJECTIVE  Pain Level (0-10 scale): 0  Any medication changes, allergies to medications, adverse drug reactions, diagnosis change, or new procedure performed?: [x] No    [] Yes (see summary sheet for update)  Subjective functional status/changes:   [] No changes reported  The pt reports she may notice a little improvement but still intermittent pain    OBJECTIVE    Modality rationale: decrease pain and increase tissue extensibility to improve the patients ability to perform ADLs with less pain   Min Type Additional Details    [] Estim:  []Unatt       []IFC  []Premod                        []Other:  []w/ice   []w/heat  Position:  Location:   6+2 [x] Estim: [x]Att    []TENS instruct  []NMES                    []Other:  [x]w/US   []w/ice   []w/heat  Position: seated  Location: right UT/Supra    []  Traction: [] Cervical       []Lumbar                       [] Prone          []Supine                       []Intermittent   []Continuous Lbs:  [] before manual  [] after manual    []  Ultrasound: []Continuous   [] Pulsed                           []1MHz   []3MHz W/cm2:  Location:    []  Iontophoresis with dexamethasone         Location: [] Take home patch   [] In clinic   8 [x]  Ice     []  heat  []  Ice massage  []  Laser   []  Anodyne Position:  seated  Location: right shoulder    []  Laser with stim  []  Other:  Position:  Location:    []  Vasopneumatic Device Pressure:       [] lo [] med [] hi   Temperature: [] lo [] med [] hi   [] Skin assessment post-treatment:  []intact []redness- no adverse reaction []redness  adverse reaction:     16 min Therapeutic Exercise:  [] See flow sheet :   Rationale: increase ROM and increase strength to improve the patients ability to perform ADLs    10 min Neuromuscular Re-education:  []  See flow sheet :   Rationale: improve coordination and increase proprioception  to improve the patients ability to perform functional tasks with improved scapular mechanics and stability    8 min Manual Therapy:  Right scap mobs in left s/l, right infraspinatus STM   The manual therapy interventions were performed at a separate and distinct time from the therapeutic activities interventions. Rationale: decrease pain and increase tissue extensibility to improve ease of ADLs and self care            With   [] TE   [] TA   [] neuro   [] other: Patient Education: [x] Review HEP    [] Progressed/Changed HEP based on:   [] positioning   [] body mechanics   [] transfers   [] heat/ice application    [] other:      Other Objective/Functional Measures: pt still noting compensation through proximal scap with band ER today     Pain Level (0-10 scale) post treatment: 0    ASSESSMENT/Changes in Function: The pt is progressing slowly with mechanics work. She still demonstrates upper scapular compensatory dominance with exercises. Improved form with cuing. Will continue to progress with posterior shoulder strength work to reduce proximal shoulder pain    Patient will continue to benefit from skilled PT services to modify and progress therapeutic interventions, address functional mobility deficits, address ROM deficits, address strength deficits, analyze and address soft tissue restrictions, analyze and cue movement patterns and analyze and modify body mechanics/ergonomics to attain remaining goals. []  See Plan of Care  []  See progress note/recertification  []  See Discharge Summary         Progress towards goals / Updated goals:  Short Term Goals: To be accomplished in 1 weeks:  1.  Pt will demonstrate I and compliance with HEP to maximize therapeutic effect.              FN: HEP issued and instructed              Current: goal met per patient 10/30/20  Long Term Goals: To be accomplished in 4 weeks:  1. Pt will demonstrate 150 deg right shoulder flexion and abduction without pain to improve ease of ADLs.             NH: 150 deg and 130 deg with pain at end range   Current: slow progress- still noting pain ~4/10 at end range 11/4/2020  2. Pt will demonstrate 5/5 scapular strength for improved shoulder mechanics with ADLs.             BE: 4 to 4+/5   3. Pt will demonstrate 5/5 right shoulder ER strength without pain for improved RTC stability with exercise.              IE: 4+/5 with pain  4.  Pt will demonstrate 2# full can to 120 deg without pain for improved ability to reach overhead shelf.              IE: unable to lift to overhead shelf    PLAN  []  Upgrade activities as tolerated     []  Continue plan of care  []  Update interventions per flow sheet       []  Discharge due to:_  []  Other:_      Elvin Nicholson DPABHIJIT CMTPT 11/4/2020  11:56 AM    Future Appointments   Date Time Provider Paulette Tello   11/9/2020 12:00 PM Saurabh Farrell PTA [facility]   11/10/2020 11:00 AM ZURI Rooney [facility]   11/13/2020 11:15 AM Kalyani [facility]   11/16/2020 12:00 PM Saurabh Farrell PTA [facility]   [facility]18/2020 11[facility]30 [facility]

## 2020-11-09 ENCOUNTER — HOSPITAL ENCOUNTER (OUTPATIENT)
Dept: PHYSICAL THERAPY | Age: 65
Discharge: HOME | End: 2020-11-09
Payer: COMMERCIAL

## 2020-11-09 PROCEDURE — 97140 MANUAL THERAPY 1/> REGIONS: CPT

## 2020-11-09 PROCEDURE — 97112 NEUROMUSCULAR REEDUCATION: CPT

## 2020-11-09 PROCEDURE — 97110 THERAPEUTIC EXERCISES: CPT

## 2020-11-09 NOTE — PROGRESS NOTES
PT DAILY TREATMENT NOTE     Patient Name: Marcos Bryant  Date:2020  : 1955  [x]  Patient  Verified  Payor: Danial Runner / Plan: [address] [place] [address] / Product Type: PPO /    In time:12:00  Out time:1:00  Total Treatment Time (min): 60  Visit #: 5 of 8    Medicare/BCBS Only   Total Timed Codes (min):  50 1:1 Treatment Time:  50       Treatment Area: Right shoulder pain [M25.511]    SUBJECTIVE  Pain Level (0-10 scale): 0  Any medication changes, allergies to medications, adverse drug reactions, diagnosis change, or new procedure performed?: [x] No    [] Yes (see summary sheet for update)  Subjective functional status/changes:   [] No changes reported  Pt reports she is feeling a lot better and has no pain coming in today. OBJECTIVE    Modality rationale: decrease inflammation and decrease pain to improve the patients ability to perform daily task with ease.    Min Type Additional Details    [] Estim:  []Unatt       []IFC  []Premod                        []Other:  []w/ice   []w/heat  Position:  Location:    [] Estim: []Att    []TENS instruct  []NMES                    []Other:  []w/US   []w/ice   []w/heat  Position:  Location:    []  Traction: [] Cervical       []Lumbar                       [] Prone          []Supine                       []Intermittent   []Continuous Lbs:  [] before manual  [] after manual    []  Ultrasound: []Continuous   [] Pulsed                           []1MHz   []3MHz W/cm2:  Location:    []  Iontophoresis with dexamethasone         Location: [] Take home patch   [] In clinic   10 [x]  Ice     []  heat  []  Ice massage  []  Laser   []  Anodyne Position: seated  Location: right shoulder    []  Laser with stim  []  Other:  Position:  Location:    []  Vasopneumatic Device Pressure:       [] lo [] med [] hi   Temperature: [] lo [] med [] hi   [] Skin assessment post-treatment:  []intact []redness- no adverse reaction    []redness  adverse reaction:       34 min Therapeutic Exercise:  [x] See flow sheet :   Rationale: increase ROM and increase strength to improve the patients ability to perform functional task with ease. 8 min Neuromuscular Re-education:  [x]  See flow sheet :   Rationale: increase strength, improve coordination and increase proprioception  to improve the patients ability to perform ADL's with ease    8 min Manual Therapy:   Right scap mobs in left s/l, right infraspinatus STM   The manual therapy interventions were performed at a separate and distinct time from the therapeutic activities interventions. Rationale: decrease pain, increase ROM, increase tissue extensibility and decrease trigger points to improve functional task with ease. With   [] TE   [] TA   [] neuro   [] other: Patient Education: [x] Review HEP    [] Progressed/Changed HEP based on:   [] positioning   [] body mechanics   [] transfers   [] heat/ice application    [] other:      Other Objective/Functional Measures:   S/L ER- 12x     Pain Level (0-10 scale) post treatment: 0    ASSESSMENT/Changes in Function:   Pt displays ease with exercises secondary to decreased pain. Some continued limitations with scapular mobility noted with prone I's, T's and W's with displaying improved mobility with tactile cueing. Pt notes increased discomfort with T-band ER that eased with decreasing the intensity of the exercises and reports no pain following. Patient will continue to benefit from skilled PT services to modify and progress therapeutic interventions, address functional mobility deficits, address ROM deficits, address strength deficits, analyze and address soft tissue restrictions, analyze and cue movement patterns, analyze and modify body mechanics/ergonomics and assess and modify postural abnormalities to attain remaining goals.      [x]  See Plan of Care  []  See progress note/recertification  []  See Discharge Summary         Progress towards goals / Updated goals:  Short Term Goals: To be accomplished in 1 weeks:  1. Pt will demonstrate I and compliance with HEP to maximize therapeutic effect.              FF: HEP issued and instructed              Current: goal met per patient 10/30/20  Long Term Goals: To be accomplished in 4 weeks:  1. Pt will demonstrate 150 deg right shoulder flexion and abduction without pain to improve ease of ADLs.             GR: 150 deg and 130 deg with pain at end range              Current: slow progress- still noting pain ~4/10 at end range 11/4/2020  2. Pt will demonstrate 5/5 scapular strength for improved shoulder mechanics with ADLs.             WL: 4 to 4+/5   3. Pt will demonstrate 5/5 right shoulder ER strength without pain for improved RTC stability with exercise.              IE: 4+/5 with pain  4.  Pt will demonstrate 2# full can to 120 deg without pain for improved ability to reach overhead shelf.              IE: unable to lift to overhead shelf    PLAN  []  Upgrade activities as tolerated     [x]  Continue plan of care  []  Update interventions per flow sheet       []  Discharge due to:_  []  Other:_      Janee Keller PTA 11/9/2020  11:59 AM    Future Appointments   Date Time Provider Paulette Tello   11/9/2020 12:00 PM Saurabh Farrell PTA [facility]   11/10/2020 11:00 AM ZURI Rooney [facility]   11/13/2020 11:15 AM Kalyani [facility]   11/16/2020 12:00 PM KRYSTIAN [facility]ondoPTJENNA [facility]   11/18/2020 11:30 AM Kalyani Song [facility]

## 2020-11-10 ENCOUNTER — OFFICE VISIT (OUTPATIENT)
Dept: ORTHOPEDIC SURGERY | Age: 65
End: 2020-11-10
Payer: COMMERCIAL

## 2020-11-10 VITALS
HEART RATE: 76 BPM | OXYGEN SATURATION: 100 % | SYSTOLIC BLOOD PRESSURE: 123 MMHG | TEMPERATURE: 97.7 F | DIASTOLIC BLOOD PRESSURE: 82 MMHG | BODY MASS INDEX: 21.57 KG/M2 | WEIGHT: 117.2 LBS | HEIGHT: 62 IN

## 2020-11-10 DIAGNOSIS — M75.51 SUBACROMIAL BURSITIS OF RIGHT SHOULDER JOINT: Primary | ICD-10-CM

## 2020-11-10 PROCEDURE — 99214 OFFICE O/P EST MOD 30 MIN: CPT | Performed by: PHYSICIAN ASSISTANT

## 2020-11-10 NOTE — PROGRESS NOTES
Rekha Gutierrez  1955   Chief Complaint   Patient presents with    Shoulder Pain     right shoulder        HISTORY OF PRESENT ILLNESS  Rekha Gutierrez is a 60 y.o. female who presents today for evaluation of right shoulder. Patient rates pain as 0/10 today. She notes significant improvement in her shoulder especially with night pain. She is almost done with her PT and would like to cont for a little longer. Pt is also left knee status post arthroscopic partial medial menisectomy on 8/17/2020, is doing well with this. Patient denies any fever, chills, chest pain, shortness of breath or calf pain. The remainder of the review of systems is negative. There are no new illness or injuries to report since last seen in the office. There are no changes to medications, allergies, family or social history. PHYSICAL EXAM:   Visit Vitals  /82 (BP 1 Location: Left arm, BP Patient Position: Sitting)   Pulse 76   Temp 97.7 °F (36.5 °C) (Temporal)   Ht 5' 2\" (1.575 m)   Wt 117 lb 3.2 oz (53.2 kg)   SpO2 100%   BMI 21.44 kg/m²     The patient is a well-developed, well-nourished female   in no acute distress. The patient is alert and oriented times three. The patient is alert and oriented times three. Mood and affect are normal.  LYMPHATIC: lymph nodes are not enlarged and are within normal limits  SKIN: normal in color and non tender to palpation. There are no bruises or abrasions noted. NEUROLOGICAL: Motor sensory exam is within normal limits. Reflexes are equal bilaterally.  There is normal sensation to pinprick and light touch  MUSCULOSKELETAL:  Examination Right shoulder   Skin Intact   AC joint tenderness -   Biceps tenderness -   Forward flexion/Elevation    Active abduction    Glenohumeral abduction 90   External rotation ROM 30   Internal rotation ROM 30   Apprehension -   Tarshas Relocation -   Jerk -   Load and Shift -   Obriens -   Speeds -   Impingement sign  + Supraspinatus/Empty Can -, 5/5   External Rotation Strength -, 5/5   Lift Off/Belly Press -, 5/5   Neurovascular Intact         IMAGING: XR of right shoulder obtained in the office dated 9/15/2020 was reviewed and read by Dr. Kamilah Dyer: No acute abnormalities      IMPRESSION:      ICD-10-CM ICD-9-CM    1. Subacromial bursitis of right shoulder joint  M75.51 726.19 REFERRAL TO PHYSICAL THERAPY        PLAN:   1. Pt presents today with right shoulder pain due to subacromial bursitis. She is improving. Will cont with PT and transition to HEP when ready  Risk factors include: htn  2. No ultrasound exam indicated today  3. No cortisone injection indicated today   4. Yes Physical/Occupational Therapy indicated today  5. No diagnostic test indicated today:   6. No durable medical equipment indicated today  7. No referral indicated today   8. No medications indicated today:   9.  No Narcotic indicated today       RTC PRN      Kenan Garcia [facility]

## 2020-11-11 ENCOUNTER — APPOINTMENT (OUTPATIENT)
Dept: PHYSICAL THERAPY | Age: 65
End: 2020-11-11
Payer: COMMERCIAL

## 2020-11-13 ENCOUNTER — HOSPITAL ENCOUNTER (OUTPATIENT)
Dept: PHYSICAL THERAPY | Age: 65
Discharge: HOME | End: 2020-11-13
Payer: COMMERCIAL

## 2020-11-13 PROCEDURE — 97140 MANUAL THERAPY 1/> REGIONS: CPT

## 2020-11-13 PROCEDURE — 97110 THERAPEUTIC EXERCISES: CPT

## 2020-11-13 PROCEDURE — 97112 NEUROMUSCULAR REEDUCATION: CPT

## 2020-11-13 PROCEDURE — 97032 APPL MODALITY 1+ESTIM EA 15: CPT

## 2020-11-13 NOTE — PROGRESS NOTES
PT DAILY TREATMENT NOTE     Patient Name: Monique Rooney  Date:2020  : 1955  [x]  Patient  Verified  Payor: John Amado / Plan: [address] [place] [address] / Product Type: PPO /    In time:11:16  Out time:12:10  Total Treatment Time (min): 54  Visit #: 6 of 8    Medicare/BCBS Only   Total Timed Codes (min):  46 1:1 Treatment Time:  46       Treatment Area: Right shoulder pain [M25.511]    SUBJECTIVE  Pain Level (0-10 scale): 1  Any medication changes, allergies to medications, adverse drug reactions, diagnosis change, or new procedure performed?: [x] No    [] Yes (see summary sheet for update)  Subjective functional status/changes:   [] No changes reported  The pt reports she has been doing well with regards to her shoulder and reports her MD cleared her to transition to HEP when ready    OBJECTIVE    Modality rationale: decrease pain and increase tissue extensibility to improve the patients ability to perform ADLs with less pain and tightness   Min Type Additional Details    [] Estim:  []Unatt       []IFC  []Premod                        []Other:  []w/ice   []w/heat  Position:  Location:   6+2 [x] Estim: [x]Att    []TENS instruct  []NMES                    []Other:  [x]w/US   []w/ice   []w/heat  Position: seated  Location: right UT/LS    []  Traction: [] Cervical       []Lumbar                       [] Prone          []Supine                       []Intermittent   []Continuous Lbs:  [] before manual  [] after manual    []  Ultrasound: []Continuous   [] Pulsed                           []1MHz   []3MHz W/cm2:  Location:    []  Iontophoresis with dexamethasone         Location: [] Take home patch   [] In clinic   8 [x]  Ice     []  heat  []  Ice massage  []  Laser   []  Anodyne Position: seated  Location: right shoulder    []  Laser with stim  []  Other:  Position:  Location:    []  Vasopneumatic Device Pressure:       [] lo [] med [] hi   Temperature: [] lo [] med [] hi   [] Skin assessment post-treatment:  []intact []redness- no adverse reaction    []redness  adverse reaction:     20 min Therapeutic Exercise:  [] See flow sheet :   Rationale: increase ROM and increase strength to improve the patients ability to perform ADLs and self care with less pain     10 min Neuromuscular Re-education:  []  See flow sheet :   Rationale: increase strength and improve coordination  to improve the patients ability to perform ADLs with improved scapulohumeral mechanics and cervical stability    8 min Manual Therapy:  Right scap mobs in left s/l, STM right levator scap   The manual therapy interventions were performed at a separate and distinct time from the therapeutic activities interventions. Rationale: decrease pain and increase tissue extensibility to improve ease of ADL performance           With   [] TE   [] TA   [] neuro   [] other: Patient Education: [x] Review HEP    [] Progressed/Changed HEP based on:   [] positioning   [] body mechanics   [] transfers   [] heat/ice application    [] other:      Other Objective/Functional Measures: pt requires cuing to maintain posterior shoulder activation with s/l shoulder flexion as she tends to wing scapula      Pain Level (0-10 scale) post treatment: 0    ASSESSMENT/Changes in Function: The pt is doing well with regards to shoulder pain and reporting only mild tightness with shoulder abduction at end range. She would benefit from continued scapular and RTC strengthening towards HEP management    Patient will continue to benefit from skilled PT services to modify and progress therapeutic interventions, address functional mobility deficits, address ROM deficits, address strength deficits, analyze and address soft tissue restrictions, analyze and cue movement patterns and analyze and modify body mechanics/ergonomics to attain remaining goals.      []  See Plan of Care  []  See progress note/recertification  []  See Discharge Summary         Progress towards goals / Updated goals:  Short Term Goals: To be accomplished in 1 weeks:  1. Pt will demonstrate I and compliance with HEP to maximize therapeutic effect.              OH: HEP issued and instructed              Current: goal met per patient 10/30/20  Long Term Goals: To be accomplished in 4 weeks:  1. Pt will demonstrate 150 deg right shoulder flexion and abduction without pain to improve ease of ADLs.             TX: 150 deg and 130 deg with pain at end range              Current: Met 160 deg and 130 deg without pain 11/13/2020  2. Pt will demonstrate 5/5 scapular strength for improved shoulder mechanics with ADLs.             ZL: 4 to 4+/5   3. Pt will demonstrate 5/5 right shoulder ER strength without pain for improved RTC stability with exercise.              IE: 4+/5 with pain  4.  Pt will demonstrate 2# full can to 120 deg without pain for improved ability to reach overhead shelf.              IE: unable to lift to overhead shelf    PLAN  []  Upgrade activities as tolerated     []  Continue plan of care  []  Update interventions per flow sheet       []  Discharge due to:_  []  Other:_      Mayito Lund DPT, CMTPT 11/13/2020  11:21 AM    Future Appointments   Date Time Provider Paulette Tello   11/16/2020 12:00 PM Monserrat Lobato PTA [id number]   11/18/2020 11:30 AM Welton Krabbe [facility]

## 2020-11-16 ENCOUNTER — HOSPITAL ENCOUNTER (OUTPATIENT)
Dept: PHYSICAL THERAPY | Age: 65
Discharge: HOME | End: 2020-11-16
Payer: COMMERCIAL

## 2020-11-16 PROCEDURE — 97112 NEUROMUSCULAR REEDUCATION: CPT

## 2020-11-16 PROCEDURE — 97140 MANUAL THERAPY 1/> REGIONS: CPT

## 2020-11-16 PROCEDURE — 97110 THERAPEUTIC EXERCISES: CPT

## 2020-11-16 NOTE — PROGRESS NOTES
PT DAILY TREATMENT NOTE     Patient Name: Barbara Barrera  Date:2020  : 1955  [x]  Patient  Verified  Payor: Ophelia Moreno / Plan: [address] [place] [address] / Product Type: PPO /    In time:12:04  Out time:1:00  Total Treatment Time (min): 56  Visit #: 7 of 8    Medicare/BCBS Only   Total Timed Codes (min):  56 1:1 Treatment Time:  50       Treatment Area: Right shoulder pain [M25.511]    SUBJECTIVE  Pain Level (0-10 scale): 0  Any medication changes, allergies to medications, adverse drug reactions, diagnosis change, or new procedure performed?: [x] No    [] Yes (see summary sheet for update)  Subjective functional status/changes:   [] No changes reported  Pt reports no pain coming in today. OBJECTIVE    Modality rationale: decrease inflammation and decrease pain to improve the patients ability to perform daily task with ease.    Min Type Additional Details    [] Estim:  []Unatt       []IFC  []Premod                        []Other:  []w/ice   []w/heat  Position:  Location:    [] Estim: []Att    []TENS instruct  []NMES                    []Other:  []w/US   []w/ice   []w/heat  Position:  Location:    []  Traction: [] Cervical       []Lumbar                       [] Prone          []Supine                       []Intermittent   []Continuous Lbs:  [] before manual  [] after manual    []  Ultrasound: []Continuous   [] Pulsed                           []1MHz   []3MHz W/cm2:  Location:    []  Iontophoresis with dexamethasone         Location: [] Take home patch   [] In clinic   8 [x]  Ice     []  heat  []  Ice massage  []  Laser   []  Anodyne Position:seated  Location: Right shoulder    []  Laser with stim  []  Other:  Position:  Location:    []  Vasopneumatic Device Pressure:       [] lo [] med [] hi   Temperature: [] lo [] med [] hi   [] Skin assessment post-treatment:  []intact []redness- no adverse reaction    []redness  adverse reaction:         30 min Therapeutic Exercise:  [x] See flow sheet :   Rationale: increase ROM and increase strength to improve the patients ability to perform daily task with ease. 10 min Neuromuscular Re-education:  [x]  See flow sheet :   Rationale: increase strength, improve coordination and increase proprioception  to improve the patients ability to perform ADL's.    8 min Manual Therapy:   Right scap mobs in left s/l, STM right levator scap   The manual therapy interventions were performed at a separate and distinct time from the therapeutic activities interventions. Rationale: decrease pain, increase ROM, increase tissue extensibility and decrease trigger points to improve functional mobility with ADL's. With   [] TE   [] TA   [] neuro   [] other: Patient Education: [x] Review HEP    [] Progressed/Changed HEP based on:   [] positioning   [] body mechanics   [] transfers   [] heat/ice application    [] other:      Other Objective/Functional Measures:      Pain Level (0-10 scale) post treatment: 0    ASSESSMENT/Changes in Function:   Continued scapular weakness noted with I's, T's and W's noticing some improvement with tactile cueing. Pt reports no pain with therex and was able to complete exercises as prescribed. Patient will continue to benefit from skilled PT services to modify and progress therapeutic interventions, address functional mobility deficits, address ROM deficits, address strength deficits, analyze and address soft tissue restrictions, analyze and cue movement patterns, analyze and modify body mechanics/ergonomics and assess and modify postural abnormalities to attain remaining goals. [x]  See Plan of Care  []  See progress note/recertification  []  See Discharge Summary         Progress towards goals / Updated goals:  Short Term Goals: To be accomplished in 1 weeks:  1.  Pt will demonstrate I and compliance with HEP to maximize therapeutic effect.              SE: HEP issued and instructed              Current: goal met per patient 10/30/20  Long Term Goals: To be accomplished in 4 weeks:  1. Pt will demonstrate 150 deg right shoulder flexion and abduction without pain to improve ease of ADLs.             MD: 150 deg and 130 deg with pain at end range              Current: Met 160 deg and 130 deg without pain 11/13/2020  2. Pt will demonstrate 5/5 scapular strength for improved shoulder mechanics with ADLs.             RK: 4 to 4+/5    Current: no change 11/16/20   3. Pt will demonstrate 5/5 right shoulder ER strength without pain for improved RTC stability with exercise.              IE: 4+/5 with pain  4.  Pt will demonstrate 2# full can to 120 deg without pain for improved ability to reach overhead shelf.              IE: unable to lift to overhead shelf    PLAN  []  Upgrade activities as tolerated     [x]  Continue plan of care  []  Update interventions per flow sheet       []  Discharge due to:_  []  Other:_      Carly Saenz PTA 11/16/2020  12:16 PM    Future Appointments   Date Time Provider Paulette Tello   11/18/2020 11:30 AM [facility]

## 2020-11-18 ENCOUNTER — HOSPITAL ENCOUNTER (OUTPATIENT)
Dept: PHYSICAL THERAPY | Age: 65
Discharge: HOME | End: 2020-11-18
Payer: COMMERCIAL

## 2020-11-18 PROCEDURE — 97112 NEUROMUSCULAR REEDUCATION: CPT

## 2020-11-18 PROCEDURE — 97110 THERAPEUTIC EXERCISES: CPT

## 2020-11-18 PROCEDURE — 97032 APPL MODALITY 1+ESTIM EA 15: CPT

## 2020-11-18 NOTE — PROGRESS NOTES
In 1 [facility] [address]  [place], [address]  [phone] fax    Physical Therapy Progress Note  Patient name: Susie Bellamy Start of Care: 10/26/2020   Referral source: Faby Arguelles,* : 1955                Medical Diagnosis: Right shoulder pain [M25.511]  Payor: BLUE CROSS / Plan: [address] [place] [address] / Product Type: PPO /  Onset Date:10 years                Treatment Diagnosis: Right shoulder pain   Prior Hospitalization: see medical history Provider#: [id number]   Medications: Verified on Patient summary List    Comorbidities: spinal stenosis, autoimmune disorder, arthritis, depression, osteoporosis, HTN, thyroid problems   Prior Level of Function: Pt with limited overhead reaching and right UE use over past 10 years due to pain    Visits from Start of Care: 8    Missed Visits: 0      Established Goals:        Excellent         Good         Limited            None  [x] Increased ROM   [x]  []  []  []  [x] Increased Strength  []  []  [x]  []  [] Increased Mobility  []  []  []  []   [x] Decreased Pain   []  [x]  []  []  [] Decreased Swelling  []  []  []  []    Key Functional Changes: improved FOTO score exceeding predicted value  Short Term Goals: To be accomplished in 1 weeks:  1. Pt will demonstrate I and compliance with HEP to maximize therapeutic effect.              FN: HEP issued and instructed              Current: goal met per patient 10/30/20  Long Term Goals: To be accomplished in 4 weeks:  1. Pt will demonstrate 150 deg right shoulder flexion and abduction without pain to improve ease of ADLs.             I deg and 130 deg with pain at end range              Current: Met 160 deg and 130 deg without pain 2020  2. Pt will demonstrate 5/5 scapular strength for improved shoulder mechanics with ADLs.             GY: 4 to 4+/5               Current: no change 20   3.  Pt will demonstrate 5/5 right shoulder ER strength without pain for improved RTC stability with exercise.              IE: 4+/5 with pain              Current: Met 5/5 at side 11/18/2020  4. Pt will demonstrate 2# full can to 120 deg without pain for improved ability to reach overhead shelf.              IE: unable to lift to overhead shelf              Current: progressing to full cans next visit 11/18/2020    Updated Goals: to be achieved in 3-4 weeks:  1. 2. Pt will demonstrate 5/5 scapular strength for improved shoulder mechanics with ADLs. PN: no change 4 to 4+/5  2. Pt will demonstrate 2# full can to 120 deg without pain for improved ability to reach overhead shelf. PN: progressing to full cans next visit    ASSESSMENT/RECOMMENDATIONS: The pt demonstrates improved pain and ROM since SOC. Posterior cuff strength is improved also, still limited with scapular strength and stability. The pt would benefit from continued PT to further improve scap control and stability to reduce proximal shoulder discomfort with ADLs. [x]Continue therapy per initial plan/protocol at a frequency of  2 x per week for 3-4 weeks  []Continue therapy with the following recommended changes:_____________________      _____________________________________________________________________  []Discontinue therapy progressing towards or have reached established goals  []Discontinue therapy due to lack of appreciable progress towards goals  []Discontinue therapy due to lack of attendance or compliance  []Await Physician's recommendations/decisions regarding therapy  []Other:________________________________________________________________    Thank you for this referral.    Mayito HUTCHISONT, CMTPT 11/18/2020 12:30 PM  NOTE TO PHYSICIAN:  PLEASE COMPLETE THE ORDERS BELOW AND   FAX TO [facility] [place] [facility]: [phone]  If you are unable to process this request in 24 hours please contact our office: [phone]    ?  I have read the above report and request that my patient continue as recommended. ? I have read the above report and request that my patient continue therapy with the following changes/special instructions:__________________________________________________________  ? I have read the above report and request that my patient be discharged from therapy.     Physicians signature: ______________________________Date: ______Time:______

## 2020-11-18 NOTE — PROGRESS NOTES
PT DAILY TREATMENT NOTE     Patient Name: Saeed Marie  Date:2020  : 1955  [x]  Patient  Verified  Payor: Michele Jaquez / Plan: [address] [place] [address] / Product Type: PPO /    In time:11:35  Out time:12:23  Total Treatment Time (min): 48  Visit #: 8 of 8    Medicare/BCBS Only   Total Timed Codes (min):  38 1:1 Treatment Time:  38       Treatment Area: Right shoulder pain [M25.511]    SUBJECTIVE  Pain Level (0-10 scale):  2  Any medication changes, allergies to medications, adverse drug reactions, diagnosis change, or new procedure performed?: [x] No    [] Yes (see summary sheet for update)  Subjective functional status/changes:   [] No changes reported  The pt reports she raked a lot yesterday and her shoulder is sore today    OBJECTIVE    Modality rationale: decrease pain and increase tissue extensibility to improve the patients ability to improve ease of ADLs    Min Type Additional Details    [] Estim:  []Unatt       []IFC  []Premod                        []Other:  []w/ice   []w/heat  Position:  Location:   6+2 [x] Estim: [x]Att    []TENS instruct  []NMES                    []Other:  [x]w/US   []w/ice   []w/heat  Position: seated  Location: right UT/LS    []  Traction: [] Cervical       []Lumbar                       [] Prone          []Supine                       []Intermittent   []Continuous Lbs:  [] before manual  [] after manual    []  Ultrasound: []Continuous   [] Pulsed                           []1MHz   []3MHz W/cm2:  Location:    []  Iontophoresis with dexamethasone         Location: [] Take home patch   [] In clinic   10 [x]  Ice     []  heat  []  Ice massage  []  Laser   []  Anodyne Position: seated  Location: right shoulder     []  Laser with stim  []  Other:  Position:  Location:    []  Vasopneumatic Device Pressure:       [] lo [] med [] hi   Temperature: [] lo [] med [] hi   [] Skin assessment post-treatment:  []intact []redness- no adverse reaction    []redness  adverse reaction:       10 min Therapeutic Exercise:  [] See flow sheet :   Rationale: increase ROM and increase strength to improve the patients ability to perform ADLs with increased ease     12 min Neuromuscular Re-education:  []  See flow sheet :   Rationale: increase strength and improve coordination  to improve the patients ability to perform functional tasks with improved scapular stability and control     8 min Manual Therapy:  DTM/MFR right levator scap, right scap mobs   The manual therapy interventions were performed at a separate and distinct time from the therapeutic activities interventions. Rationale: decrease pain and increase tissue extensibility to improve contractility of RTC and scap musculature            With   [] TE   [] TA   [] neuro   [] other: Patient Education: [x] Review HEP    [] Progressed/Changed HEP based on:   [] positioning   [] body mechanics   [] transfers   [] heat/ice application    [] other:      Other Objective/Functional Measures: added resistance to s/l and prone shoulder ex's today for improved strength work     Pain Level (0-10 scale) post treatment: 0    ASSESSMENT/Changes in Function: The pt demonstrates improved pain and ROM since [facility]. Posterior cuff strength is improved also, still limited with scapular strength and stability. The pt would benefit from continued PT to further improve scap control and stability to reduce proximal shoulder discomfort with ADLs. Patient will continue to benefit from skilled PT services to modify and progress therapeutic interventions, address functional mobility deficits, address strength deficits, analyze and address soft tissue restrictions, analyze and cue movement patterns, analyze and modify body mechanics/ergonomics and assess and modify postural abnormalities to attain remaining goals.      []  See Plan of Care  [x]  See progress note/recertification  []  See Discharge Summary         Progress towards goals / Updated goals:  Short Term Goals: To be accomplished in 1 weeks:  1. Pt will demonstrate I and compliance with HEP to maximize therapeutic effect.              FI: HEP issued and instructed              Current: goal met per patient 10/30/20  Long Term Goals: To be accomplished in 4 weeks:  1. Pt will demonstrate 150 deg right shoulder flexion and abduction without pain to improve ease of ADLs.             HJ: 150 deg and 130 deg with pain at end range              Current: Met 160 deg and 130 deg without pain 11/13/2020  2. Pt will demonstrate 5/5 scapular strength for improved shoulder mechanics with ADLs.             DS: 4 to 4+/5               Current: no change 11/16/20   3. Pt will demonstrate 5/5 right shoulder ER strength without pain for improved RTC stability with exercise.              IE: 4+/5 with pain   Current: Met 5/5 at side 11/18/2020  4. Pt will demonstrate 2# full can to 120 deg without pain for improved ability to reach overhead shelf.              IE: unable to lift to overhead shelf   Current: progressing to full cans next visit 11/18/2020    PLAN  [x]  Upgrade activities as tolerated     []  Continue plan of care  []  Update interventions per flow sheet       []  Discharge due to:_  []  Other:_      Maddy Carmona DPT, CMTPT 11/18/2020  11:55 AM    No future appointments.

## 2020-12-01 ENCOUNTER — HOSPITAL ENCOUNTER (OUTPATIENT)
Dept: PHYSICAL THERAPY | Age: 65
Discharge: HOME | End: 2020-12-01
Payer: COMMERCIAL

## 2020-12-01 PROCEDURE — 97110 THERAPEUTIC EXERCISES: CPT

## 2020-12-01 PROCEDURE — 97140 MANUAL THERAPY 1/> REGIONS: CPT

## 2020-12-01 PROCEDURE — 97032 APPL MODALITY 1+ESTIM EA 15: CPT

## 2020-12-01 NOTE — PROGRESS NOTES
PT DAILY TREATMENT NOTE     Patient Name: Charito Sanches  Date:2020  : 1955  [x]  Patient  Verified  Payor: Richard Meza / Plan: [address] [place] [address] / Product Type: PPO /    In time:12:05  Out time:12:51  Total Treatment Time (min): 46  Visit #: 1 of 6-8    Medicare/BCBS Only   Total Timed Codes (min):  46 1:1 Treatment Time:  41       Treatment Area: Right shoulder pain [M25.511]    SUBJECTIVE  Pain Level (0-10 scale): 0  Any medication changes, allergies to medications, adverse drug reactions, diagnosis change, or new procedure performed?: [x] No    [] Yes (see summary sheet for update)  Subjective functional status/changes:   [] No changes reported  \"i'm not having any real pain today\"    OBJECTIVE    Modality rationale: decrease pain and increase tissue extensibility to improve the patients ability to    Min Type Additional Details    [] Estim:  []Unatt       []IFC  []Premod                        []Other:  []w/ice   []w/heat  Position:  Location:   6+2 [x] Estim: []Att    []TENS instruct  []NMES                    []Other:  [x]w/US   []w/ice   []w/heat  Position:seated  Location: right UT    []  Traction: [] Cervical       []Lumbar                       [] Prone          []Supine                       []Intermittent   []Continuous Lbs:  [] before manual  [] after manual    []  Ultrasound: []Continuous   [] Pulsed                           []1MHz   []3MHz W/cm2:  Location:    []  Iontophoresis with dexamethasone         Location: [] Take home patch   [] In clinic   10 [x]  Ice     []  heat  []  Ice massage  []  Laser   []  Anodyne Position:seated   Location: right shoulder    []  Laser with stim  []  Other:  Position:  Location:    []  Vasopneumatic Device Pressure:       [] lo [] med [] hi   Temperature: [] lo [] med [] hi   [] Skin assessment post-treatment:  []intact []redness- no adverse reaction    []redness  adverse reaction:     20 min Therapeutic Exercise:  [x] See flow sheet :   Rationale: increase ROM, increase strength and improve coordination to improve the patients ability to perform functional task with ease. 8 min Manual Therapy:  DTM/MFR right levator scap, right scap mobs   The manual therapy interventions were performed at a separate and distinct time from the therapeutic activities interventions. Rationale: decrease pain, increase ROM, increase tissue extensibility and decrease trigger points to improve functional mobility with overhead reaching. With   [] TE   [] TA   [] neuro   [] other: Patient Education: [x] Review HEP    [] Progressed/Changed HEP based on:   [] positioning   [] body mechanics   [] transfers   [] heat/ice application    [] other:      Other Objective/Functional Measures:   Full cans- 10x ea   D1 flexion at [employer]- 4# 10x  Rows/ext at [employer]- 5# 10x ea    Pain Level (0-10 scale) post treatment: 0    ASSESSMENT/Changes in Function:   Full cans, D1 flexion on the [place] and rows/Ext on [address] added this visit to continue to improve right shoulder and scapular strength and mobility to improve overhead reaching ADL's. Pt had no complaints with progressed therex and reports no pain post treatment. Patient will continue to benefit from skilled PT services to modify and progress therapeutic interventions, address functional mobility deficits, address ROM deficits, address strength deficits, analyze and address soft tissue restrictions, analyze and cue movement patterns, analyze and modify body mechanics/ergonomics and assess and modify postural abnormalities to attain remaining goals. [x]  See Plan of Care  []  See progress note/recertification  []  See Discharge Summary         Progress towards goals / Updated goals:  1. 2. Pt will demonstrate 5/5 scapular strength for improved shoulder mechanics with ADLs. PN: no change 4 to 4+/5  2. Pt will demonstrate 2# full can to 120 deg without pain for improved ability to reach overhead shelf.   PN: progressing to full cans next visit    PLAN  []  Upgrade activities as tolerated     [x]  Continue plan of care  []  Update interventions per flow sheet       []  Discharge due to:_  []  Other:_      Yovana Ambrocio, KRYSTIAN 12/1/2020  12:03 PM    Future Appointments   Date Time Provider Paulette Tello   12/4/2020 10:30 AM Guerrero Monday, PTA [facility]   12/7/2020 10:30 AM Hiwot Kerr [facility]   12/10/2020 10:00 AM Guerrero Monday, PTA [facility]   12/14/2020 10:30 AM Hiwot Kerr [facility]   12/16/2020 10:45 AM Hiwot Kerr [facility]

## 2020-12-04 ENCOUNTER — HOSPITAL ENCOUNTER (OUTPATIENT)
Dept: PHYSICAL THERAPY | Age: 65
Discharge: HOME | End: 2020-12-04
Payer: COMMERCIAL

## 2020-12-04 PROCEDURE — 97140 MANUAL THERAPY 1/> REGIONS: CPT

## 2020-12-04 PROCEDURE — 97110 THERAPEUTIC EXERCISES: CPT

## 2020-12-04 PROCEDURE — 97112 NEUROMUSCULAR REEDUCATION: CPT

## 2020-12-04 NOTE — PROGRESS NOTES
PT DAILY TREATMENT NOTE     Patient Name: Jill De La Rosa  Date:2020  : 1955  [x]  Patient  Verified  Payor: [employer] C[employer]olla / Plan: [address] [place] [address] / Product Type: PPO /    In time:10:32  Out time:11:18  Total Treatment Time (min): 46  Visit #: 2 of     Medicare/BCBS Only   Total Timed Codes (min):  46 1:1 Treatment Time:  46       Treatment Area: Right shoulder pain [M25.511]    SUBJECTIVE  Pain Level (0-10 scale): 2  Any medication changes, allergies to medications, adverse drug reactions, diagnosis change, or new procedure performed?: [x] No    [] Yes (see summary sheet for update)  Subjective functional status/changes:   [] No changes reported  Pt reports she overdid it yesterday working in her garden so she is a little sore. OBJECTIVE    30 min Therapeutic Exercise:  [x] See flow sheet :   Rationale: increase ROM and increase strength to improve the patients ability to perform functional task with ease. 8 min Neuromuscular Re-education:  [x]  See flow sheet :   Rationale: increase strength, improve coordination and increase proprioception  to improve the patients ability to perform ADL's with ease. 8 min Manual Therapy:  DTM/MFR right levator scap, right scap mobs   The manual therapy interventions were performed at a separate and distinct time from the therapeutic activities interventions. Rationale: decrease pain, increase ROM and increase tissue extensibility to improve functional mobility with overhead reaching ADL's.             With   [] TE   [] TA   [] neuro   [] other: Patient Education: [x] Review HEP    [] Progressed/Changed HEP based on:   [] positioning   [] body mechanics   [] transfers   [] heat/ice application    [] other:      Other Objective/Functional Measures:      Pain Level (0-10 scale) post treatment: 0    ASSESSMENT/Changes in Function:   Ball on the wall and 3 way star with org band and IR/ER on the yanna added to continue to strengthen and stabilize the right shoulder and scap with pt displaying some mm fatigue with exercise but able to perform therex as prescribed. Full cans with 1# added with pt reporting a slight pull in the posterior shoulder that eased as pt moved through the exercises and notes no pain by completion. Pt reports no pain post treatment. Patient will continue to benefit from skilled PT services to modify and progress therapeutic interventions, address functional mobility deficits, address ROM deficits, address strength deficits, analyze and address soft tissue restrictions, analyze and cue movement patterns, analyze and modify body mechanics/ergonomics and assess and modify postural abnormalities to attain remaining goals. [x]  See Plan of Care  []  See progress note/recertification  []  See Discharge Summary           Progress towards goals / Updated goals:  1. 2. Pt will demonstrate 5/5 scapular strength for improved shoulder mechanics with ADLs. PN: no change 4 to 4+/5  2. Pt will demonstrate 2# full can to 120 deg without pain for improved ability to reach overhead shelf. PN: progressing to full cans next visit  Current: progressing 12/4/20  full cans with 1# for 10 reps.     PLAN  []  Upgrade activities as tolerated     [x]  Continue plan of care  []  Update interventions per flow sheet       []  Discharge due to:_  []  Other:_      Lea Tejeda PTA 12/4/2020  10:30 AM    Future Appointments   Date Time Provider Paulette Tello   12/7/2020 10:30 AM Harris [address] [facility]   12/10/2020 10:00 AM Matt Mix PTA [facility]   [facility]14[facility]2020 [facility]   12/16/2020 10:45 AM [facility]

## 2020-12-07 ENCOUNTER — APPOINTMENT (OUTPATIENT)
Dept: PHYSICAL THERAPY | Age: 65
End: 2020-12-07
Payer: COMMERCIAL

## 2020-12-10 ENCOUNTER — HOSPITAL ENCOUNTER (OUTPATIENT)
Dept: PHYSICAL THERAPY | Age: 65
Discharge: HOME | End: 2020-12-10
Payer: COMMERCIAL

## 2020-12-10 PROCEDURE — 97110 THERAPEUTIC EXERCISES: CPT

## 2020-12-10 PROCEDURE — 97032 APPL MODALITY 1+ESTIM EA 15: CPT

## 2020-12-10 PROCEDURE — 97140 MANUAL THERAPY 1/> REGIONS: CPT

## 2020-12-10 PROCEDURE — 97112 NEUROMUSCULAR REEDUCATION: CPT

## 2020-12-10 NOTE — PROGRESS NOTES
PT DAILY TREATMENT NOTE     Patient Name: Pretty Valle  Date:12/10/2020  : 1955  [x]  Patient  Verified  Payor: Sheela Jose / Plan: [address] [place] [address] / Product Type: PPO /    In time:10:00  Out time:11:00  Total Treatment Time (min): 60  Visit #: 3 of 6-8    Medicare/BCBS Only   Total Timed Codes (min):  60 1:1 Treatment Time:  53       Treatment Area: Right shoulder pain [M25.511]    SUBJECTIVE  Pain Level (0-10 scale): 0  Any medication changes, allergies to medications, adverse drug reactions, diagnosis change, or new procedure performed?: [x] No    [] Yes (see summary sheet for update)  Subjective functional status/changes:   [] No changes reported  Pt reports she wants to get back to doing yoga.            Modality rationale: decrease pain and increase tissue extensibility to improve the patients ability to perform ADL's. Min Type Additional Details      []? Estim:  []? Unatt       []? IFC  []? Premod                        []?Other:  []?w/ice   []?w/heat  Position:  Location:    6+2 [x]? Estim: []? Att    []? TENS instruct  []? NMES                    []?Other:  [x]?w/US   []?w/ice   []?w/heat  Position:seated  Location: right UT      []? Traction: []? Cervical       []? Lumbar                       []? Prone          []? Supine                       []?Intermittent   []? Continuous Lbs:  []? before manual  []? after manual      []? Ultrasound: []? Continuous   []? Pulsed                           []? 1MHz   []? 3MHz W/cm2:  Location:      []? Iontophoresis with dexamethasone         Location: []? Take home patch   []? In clinic     []? Ice     []?  heat  []? Ice massage  []? Laser   []? Anodyne Position:seated   Location: right shoulder      []? Laser with stim  []? Other:  Position:  Location:      []? Vasopneumatic Device Pressure:       []? lo []? med []? hi   Temperature: []? lo []? med []? hi    []? Skin assessment post-treatment:  []?intact []? redness- no adverse reaction []?redness  adverse         OBJECTIVE    32 min Therapeutic Exercise:  [x] See flow sheet :   Rationale: increase ROM, increase strength and improve coordination to improve the patients ability to perform ADL's with ease. 12 min Neuromuscular Re-education:  [x]  See flow sheet :   Rationale: increase strength, improve coordination and increase proprioception  to improve the patients ability to perform daily task with ease. 8 min Manual Therapy:  DTM/MFR right levator scap, right scap mobs in left S/L. The manual therapy interventions were performed at a separate and distinct time from the therapeutic activities interventions. Rationale: decrease pain, increase ROM, increase tissue extensibility and decrease trigger points to improve functional mobility with ADL's. With   [] TE   [] TA   [] neuro   [] other: Patient Education: [x] Review HEP    [] Progressed/Changed HEP based on:   [] positioning   [] body mechanics   [] transfers   [] heat/ice application    [] other:      Other Objective/Functional Measures:   2# with full cans- 10x ea  Push-ups at table- 10x     Pain Level (0-10 scale) post treatment: 0    ASSESSMENT/Changes in Function:   Progressed therex adding 2# to full cans, push-ups on the table and the red ball for ball on the wall to continue to improve right shoulder and scapular strength and stability. Pt notes some mm fatigue with 2# DB with full cans but was able to complete exercise. No pain increase throughout therex and no pain reported post treatment. Patient will continue to benefit from skilled PT services to modify and progress therapeutic interventions, address functional mobility deficits, address ROM deficits, address strength deficits, analyze and address soft tissue restrictions, analyze and cue movement patterns, analyze and modify body mechanics/ergonomics and assess and modify postural abnormalities to attain remaining goals.      [x]  See Plan of Care  []  See progress note/recertification  []  See Discharge Summary         Progress towards goals / Updated goals:  1 Pt will demonstrate 5/5 scapular strength for improved shoulder mechanics with ADLs. PN: no change 4 to 4+/5  Current: little progress 12/10/20 4+/5  2. Pt will demonstrate 2# full can to 120 deg without pain for improved ability to reach overhead shelf.   PN: progressing to full cans next visit  Current: progressing 12/4/20  full cans with 1# for 10 reps    PLAN  []  Upgrade activities as tolerated     [x]  Continue plan of care  []  Update interventions per flow sheet       []  Discharge due to:_  []  Other:_      Tuilo Aragon, PTA 12/10/2020  11:11 AM    Future Appointments   Date Time Provider Paulette Tello   12/14/2020 10:30 AM Harris [address] [facility]   12/16/2020 10:45 [facility]

## 2020-12-14 ENCOUNTER — HOSPITAL ENCOUNTER (OUTPATIENT)
Dept: PHYSICAL THERAPY | Age: 65
Discharge: HOME | End: 2020-12-14
Payer: COMMERCIAL

## 2020-12-14 PROCEDURE — 97110 THERAPEUTIC EXERCISES: CPT

## 2020-12-14 PROCEDURE — 97140 MANUAL THERAPY 1/> REGIONS: CPT

## 2020-12-14 PROCEDURE — 97112 NEUROMUSCULAR REEDUCATION: CPT

## 2020-12-14 NOTE — PROGRESS NOTES
PT DAILY TREATMENT NOTE     Patient Name: Craig Treadwell  Date:2020  : 1955  [x]  Patient  Verified  Payor: BLUE CROSS / Plan: [address] [place] [address] / Product Type: PPO /    In time:10:33  Out time:11:14  Total Treatment Time (min): 41  Visit #: 4 of 6-8    Medicare/BCBS Only   Total Timed Codes (min):  41 1:1 Treatment Time:  41       Treatment Area: Right shoulder pain [M25.511]    SUBJECTIVE  Pain Level (0-10 scale): 1  Any medication changes, allergies to medications, adverse drug reactions, diagnosis change, or new procedure performed?: [x] No    [] Yes (see summary sheet for update)  Subjective functional status/changes:   [] No changes reported  \"A little sore, I have to remember not to overuse it\"    OBJECTIVE    Modality rationale: decrease pain and increase tissue extensibility to improve the patients ability to perform ADLs and self care   Min Type Additional Details    [] Estim:  []Unatt       []IFC  []Premod                        []Other:  []w/ice   []w/heat  Position:  Location:   5+3 [x] Estim: [x]Att    []TENS instruct  []NMES                    []Other:  [x]w/US   []w/ice   []w/heat  Position: seated  Location: right UT/LS    []  Traction: [] Cervical       []Lumbar                       [] Prone          []Supine                       []Intermittent   []Continuous Lbs:  [] before manual  [] after manual    []  Ultrasound: []Continuous   [] Pulsed                           []1MHz   []3MHz W/cm2:  Location:    []  Iontophoresis with dexamethasone         Location: [] Take home patch   [] In clinic    []  Ice     []  heat  []  Ice massage  []  Laser   []  Anodyne Position:  Location:    []  Laser with stim  []  Other:  Position:  Location:    []  Vasopneumatic Device Pressure:       [] lo [] med [] hi   Temperature: [] lo [] med [] hi   [] Skin assessment post-treatment:  []intact []redness- no adverse reaction    []redness  adverse reaction:         17 min Therapeutic Exercise:  [] See flow sheet :   Rationale: increase ROM and increase strength to improve the patients ability to perform daily tasks and self care     8 min Neuromuscular Re-education:  []  See flow sheet :   Rationale: increase strength and improve coordination  to improve the patients ability to perform functional tasks with improved scapulohumeral mechanics     8 min Manual Therapy:  STM right infraspinatus and levator scap, scap mobs right   The manual therapy interventions were performed at a separate and distinct time from the therapeutic activities interventions. Rationale: increase ROM and increase tissue extensibility to improve ADL ease            With   [] TE   [] TA   [] neuro   [] other: Patient Education: [x] Review HEP    [] Progressed/Changed HEP based on:   [] positioning   [] body mechanics   [] transfers   [] heat/ice application    [] other:      Other Objective/Functional Measures:      Pain Level (0-10 scale) post treatment: 0    ASSESSMENT/Changes in Function: The pt has progressed well with regards to pain and shoulder strength. She does have some crepitus noted at right shoulder but not reported as painful. Will plan to D/C to HEP at next visit    Patient will continue to benefit from skilled PT services to modify and progress therapeutic interventions, address functional mobility deficits, address ROM deficits, address strength deficits, analyze and address soft tissue restrictions, analyze and cue movement patterns and analyze and modify body mechanics/ergonomics to attain remaining goals. []  See Plan of Care  []  See progress note/recertification  []  See Discharge Summary         Progress towards goals / Updated goals:  1. Pt will demonstrate 5/5 scapular strength for improved shoulder mechanics with ADLs.   PN: no change 4 to 4+/5  Current: little progress 12/10/20 4+/5  2. Pt will demonstrate 2# full can to 120 deg without pain for improved ability to reach overhead shelf.  PN: progressing to full cans next visit  Current: met 2# full cans 10 reps with excellent form no pain 12/14/2020    PLAN  []  Upgrade activities as tolerated     [x]  Continue plan of care  []  Update interventions per flow sheet       []  Discharge due to:_  []  Other:_      Gabriela Cabrera DPT, CMTPT 12/14/2020  10:34 AM    Future Appointments   Date Time Provider Paulette Tello   12/16/2020 10:45 AM Louie Salts [facility]

## 2020-12-16 ENCOUNTER — HOSPITAL ENCOUNTER (OUTPATIENT)
Dept: PHYSICAL THERAPY | Age: 65
Discharge: HOME | End: 2020-12-16
Payer: COMMERCIAL

## 2020-12-16 PROCEDURE — 97112 NEUROMUSCULAR REEDUCATION: CPT

## 2020-12-16 PROCEDURE — 97110 THERAPEUTIC EXERCISES: CPT

## 2020-12-16 PROCEDURE — 97032 APPL MODALITY 1+ESTIM EA 15: CPT

## 2020-12-16 NOTE — PROGRESS NOTES
PT DISCHARGE DAILY NOTE AND [id number]  Patient name: Betzy Sousa Start of Care: 10/26/2020   Referral Nic Cuauhtemoc,* VW[place]:                 Medical Diagnosis: Right shoulder pain [M25.511]  Payor: FADI CROSS / Plan: [employer] / Product Type: PPO /  Onset Date:10 years                Treatment Diagnosis: Right shoulder pain   Prior Hospitalization: see medical history Provider#: [id number]   Medications: Verified on Patient summary List    Comorbidities: spinal stenosis, autoimmune disorder, arthritis, depression, osteoporosis, HTN, thyroid problems   Prior Level of Function: Pt with limited overhead reaching and right UE use over past 10 years due to pain    Visits from Start of Care: 13    Missed Visits: 1    Reporting Period : 2020 to 2020    Date:2020  : 1955  [x]  Patient  Verified  Payor: BLUE CROSS / Plan: [employer] / Product Type: PPO /    In time:10:46  Out time:11:29  Total Treatment Time (min): 43  Visit #: 5 of -8    Medicare/[facility] Only   Total Timed Codes (min):  43 1:1 Treatment Time:  43       SUBJECTIVE  Pain Level (0-10 scale): 0  Any medication changes, allergies to medications, adverse drug reactions, diagnosis change, or new procedure performed?: [x] No    [] Yes (see summary sheet for update)  Subjective functional status/changes:   [] No changes reported  \"Doing pretty good\"    OBJECTIVE    Modality rationale: decrease pain and increase tissue extensibility to improve the patients ability to perform ADLs with less pain   Min Type Additional Details    [] Estim:  []Unatt       []IFC  []Premod                        []Other:  []w/ice   []w/heat  Position:  Location:   5+3 [x] Estim: [x]Att    []TENS instruct  []NMES                    []Other:  [x]w/US   []w/ice   []w/heat  Position:seated  Location: right UT/LS    []  Traction: [] Cervical       []Lumbar                       [] Prone          []Supine []Intermittent   []Continuous Lbs:  [] before manual  [] after manual    []  Ultrasound: []Continuous   [] Pulsed                           []1MHz   []3MHz W/cm2:  Location:    []  Iontophoresis with dexamethasone         Location: [] Take home patch   [] In clinic    []  Ice     []  heat  []  Ice massage  []  Laser   []  Anodyne Position:  Location:    []  Laser with stim  []  Other:  Position:  Location:    []  Vasopneumatic Device Pressure:       [] lo [] med [] hi   Temperature: [] lo [] med [] hi   [] Skin assessment post-treatment:  []intact []redness- no adverse reaction    []redness  adverse reaction:       23 min Therapeutic Exercise:  [] See flow sheet :   Rationale: increase ROM and increase strength to improve the patients ability to perform ADLs and self care       10 min Neuromuscular Re-education:  []  See flow sheet :   Rationale: increase strength and improve coordination  to improve the patients ability to perform functional tasks with improved scapular and shoulder stability            With   [] TE   [] TA   [] neuro   [] other: Patient Education: [x] Review HEP    [] Progressed/Changed HEP based on:   [] positioning   [] body mechanics   [] transfers   [] heat/ice application    [] other:      Other Objective/Functional Measures:      Pain Level (0-10 scale) post treatment: 0    Summary of Care:  1. Pt will demonstrate 5/5 scapular strength for improved shoulder mechanics with ADLs. PN: no change 4 to 4+/5  Current: near met 5/5 extension and MT  4+/5 LT 12/16/2020  2. Pt will demonstrate 2# full can to 120 deg without pain for improved ability to reach overhead shelf. PN: progressing to full cans next visit  Current: met 2# full cans 10 reps with excellent form no pain 12/14/2020    ASSESSMENT/Changes in Function: The pt has progressed well with regards to neck and shoulder pain. She demonstrates improved scapular strength and mechanics.  Will D/C to HEP at this time    Thank you for this referral!      PLAN  []Discontinue therapy: []Patient has reached or is progressing toward set goals      []Patient is non-compliant or has abdicated      []Due to lack of appreciable progress towards set goals    Maddy HUTCHISONT, CMTPT 12/16/2020  11:03 AM

## 2020-12-16 NOTE — PROGRESS NOTES
Physical Therapy Discharge Instructions      [facility]  [address]  [place], [address]  [phone] fax    Patient: Isaak Oshea  : 1955      Continue Home Exercise Program 1 times per day for 2 weeks, then decrease to 3-4 times per week      Continue with    [] Ice  as needed  times per day     [] Heat           Follow up with MD:     [] Upon completion of therapy     [] As needed      Recommendations:     [x]   Return to activity with home program    []   Return to activity with the following modifications:       []Post Rehab Program    []Join Independent aquatic program     []Return to/join local gym        Additional Comments: Continue with home exercises           Mayito Lund DPT CMTPT 2020 11:30 AM

## 2021-02-02 ENCOUNTER — TRANSCRIBE ORDER (OUTPATIENT)
Dept: SCHEDULING | Age: 66
End: 2021-02-02

## 2021-02-02 DIAGNOSIS — J32.9 CHRONIC SINUSITIS: Primary | ICD-10-CM

## 2021-02-11 ENCOUNTER — HOSPITAL ENCOUNTER (OUTPATIENT)
Dept: CT IMAGING | Age: 66
Discharge: HOME | End: 2021-02-11
Attending: OTOLARYNGOLOGY
Payer: COMMERCIAL

## 2021-02-11 DIAGNOSIS — J32.9 CHRONIC SINUSITIS: ICD-10-CM

## 2021-02-11 PROCEDURE — 70486 CT MAXILLOFACIAL W/O DYE: CPT

## 2021-03-10 ENCOUNTER — TRANSCRIBE ORDER (OUTPATIENT)
Dept: SCHEDULING | Age: 66
End: 2021-03-10

## 2021-03-10 DIAGNOSIS — Z12.31 VISIT FOR SCREENING MAMMOGRAM: Primary | ICD-10-CM

## 2021-04-07 ENCOUNTER — TRANSCRIBE ORDER (OUTPATIENT)
Dept: SCHEDULING | Age: 66
End: 2021-04-07

## 2021-04-07 DIAGNOSIS — Z13.820 SPECIAL SCREENING FOR OSTEOPOROSIS: Primary | ICD-10-CM

## 2021-04-09 ENCOUNTER — HOSPITAL ENCOUNTER (OUTPATIENT)
Dept: BONE DENSITY | Age: 66
Discharge: HOME | End: 2021-04-09
Attending: INTERNAL MEDICINE
Payer: COMMERCIAL

## 2021-04-09 DIAGNOSIS — Z13.820 SPECIAL SCREENING FOR OSTEOPOROSIS: ICD-10-CM

## 2021-04-09 PROCEDURE — 77080 DXA BONE DENSITY AXIAL: CPT

## 2021-05-11 ENCOUNTER — HOSPITAL ENCOUNTER (OUTPATIENT)
Dept: MAMMOGRAPHY | Age: 66
Discharge: HOME | End: 2021-05-11
Attending: NURSE PRACTITIONER
Payer: COMMERCIAL

## 2021-05-11 ENCOUNTER — APPOINTMENT (OUTPATIENT)
Dept: BONE DENSITY | Age: 66
End: 2021-05-11
Attending: INTERNAL MEDICINE
Payer: COMMERCIAL

## 2021-05-11 DIAGNOSIS — Z12.31 VISIT FOR SCREENING MAMMOGRAM: ICD-10-CM

## 2021-05-11 PROCEDURE — 77063 BREAST TOMOSYNTHESIS BI: CPT

## 2022-03-18 PROBLEM — M51.37 OTHER INTERVERTEBRAL DISC DEGENERATION, LUMBOSACRAL REGION: Status: ACTIVE | Noted: 2017-02-15

## 2022-03-18 PROBLEM — M51.379 OTHER INTERVERTEBRAL DISC DEGENERATION, LUMBOSACRAL REGION: Status: ACTIVE | Noted: 2017-02-15

## 2022-03-18 PROBLEM — M53.3 SACROCOCCYGEAL DISORDERS, NOT ELSEWHERE CLASSIFIED: Status: ACTIVE | Noted: 2017-02-15

## 2022-03-19 PROBLEM — M54.17 THORACIC AND LUMBOSACRAL NEURITIS: Status: ACTIVE | Noted: 2017-01-16

## 2022-03-19 PROBLEM — M46.1 SACROILIITIS (HCC): Status: ACTIVE | Noted: 2017-05-12

## 2022-03-19 PROBLEM — M53.3 SACRAL PAIN: Status: ACTIVE | Noted: 2017-01-16

## 2022-03-19 PROBLEM — M81.0 OSTEOPOROSIS: Status: ACTIVE | Noted: 2017-01-16

## 2022-03-19 PROBLEM — M54.14 THORACIC AND LUMBOSACRAL NEURITIS: Status: ACTIVE | Noted: 2017-01-16

## 2022-03-19 PROBLEM — M51.37 DEGENERATION OF LUMBAR OR LUMBOSACRAL INTERVERTEBRAL DISC: Status: ACTIVE | Noted: 2017-01-16

## 2022-03-19 PROBLEM — M53.3 DISORDER OF SACROCOCCYGEAL SPINE: Status: ACTIVE | Noted: 2017-04-14

## 2022-03-19 PROBLEM — M54.14 RADICULOPATHY, THORACIC REGION: Status: ACTIVE | Noted: 2017-02-15

## 2022-03-19 PROBLEM — M51.379 DEGENERATION OF LUMBAR OR LUMBOSACRAL INTERVERTEBRAL DISC: Status: ACTIVE | Noted: 2017-01-16

## 2022-03-20 PROBLEM — M54.50 LOW BACK PAIN WITHOUT SCIATICA: Status: ACTIVE | Noted: 2017-01-16

## 2022-06-08 ENCOUNTER — TRANSCRIBE ORDER (OUTPATIENT)
Dept: SCHEDULING | Age: 67
End: 2022-06-08

## 2022-06-08 DIAGNOSIS — Z12.31 VISIT FOR SCREENING MAMMOGRAM: Primary | ICD-10-CM

## 2022-06-15 ENCOUNTER — HOSPITAL ENCOUNTER (OUTPATIENT)
Dept: MAMMOGRAPHY | Age: 67
Discharge: HOME | End: 2022-06-15
Attending: NURSE PRACTITIONER
Payer: MEDICARE

## 2022-06-15 DIAGNOSIS — Z12.31 VISIT FOR SCREENING MAMMOGRAM: ICD-10-CM

## 2022-06-15 PROCEDURE — 77063 BREAST TOMOSYNTHESIS BI: CPT

## 2022-09-06 ENCOUNTER — HOSPITAL ENCOUNTER (OUTPATIENT)
Dept: PHYSICAL THERAPY | Age: 67
Discharge: HOME | End: 2022-09-06
Payer: MEDICARE

## 2022-09-06 PROCEDURE — 97162 PT EVAL MOD COMPLEX 30 MIN: CPT

## 2022-09-06 PROCEDURE — 97110 THERAPEUTIC EXERCISES: CPT

## 2022-09-06 NOTE — PROGRESS NOTES
PT DAILY TREATMENT NOTE/LUMBAR EVAL     Patient Name: Angle Dietz  Date:2022  : 1955  [x]  Patient  Verified  Payor: Thony Queen / Plan: Antonio Hunter / Product Type: Managed Care Medicare /    In time:12:31pm  Out time:1:12pm  Total Treatment Time (min): 41  Visit #: 1 of 10    Medicare/BCBS Only   Total Timed Codes (min):  20 1:1 Treatment Time:  41     Treatment Area: Other low back pain [M54.59]  Lumbar back pain [M54.50]  SUBJECTIVE  Pain Level (0-10 scale): worst: 10/10; best 0/10  []constant []intermittent []improving []worsening []no change since onset    Any medication changes, allergies to medications, adverse drug reactions, diagnosis change, or new procedure performed?: [x] No    [] Yes (see summary sheet for update)  Subjective functional status/changes:     PLOF: Independent with ADLs  Limitations to PLOF: can't walk 5 miles(right now pain with everything; could push through), perform daily activities, swimming aggravates my pain,   Mechanism of Injury: 10 years ago my back started hurting. It would come and go. I came a few years ago for PT here. Current symptoms/Complaints: right hip pain  Previous Treatment/Compliance: Physical Therapy 5 years ago  PMHx/Surgical Hx: depression, osteoporosis, arthritis, thyroid problems, pamphigus vulgaris, high blood pressure, 2 left meniscus repairs  Work Hx: retired from teaching  Living Situation: lives with spouse- 2 story house but don't go up very often when in pain  Pt Goals: \"I want to decrease my pain. \"    OBJECTIVE/EXAMINATION  Mobility: Independent  Self Care:  Independent      25 min [x]Eval                  []Re-Eval       16 min Therapeutic Exercise:  [] See flow sheet :   Rationale: increase ROM and increase strength to improve the patients ability to perform daily activities with ease       With   [x] TE   [] TA   [] neuro   [] other: Patient Education: [x] Review HEP    [] Progressed/Changed HEP based on:   [] positioning   [] body mechanics   [] transfers   [] heat/ice application    [] other:      Other Objective/Functional Measures:     Physical Therapy Evaluation - Lumbar Spine ([facility])    Active Movements: [] N/A   [] Too acute   [] Other:  ROM % AROM % PROM Comments:pain, area   Forward flexion 40-60 75     Extension 20-30 100     SB right 20-30 100  Pain at end range   SB left 20-30 100     Rotation right 5-10      Rotation left 5-10        Repeated Movements   Effects on present pain: produces (MN), abolishes (A), increases (incr), decreases (decr), centralizes (C), peripheral (PH), no effect (NE)   Pre-Test Sx Flexion Repeated Flexion Extension Repeated Extension Repeated SBL Repeated SBR   Sitting          Standing     decr     Lying      N/A N/A   Comments:  Side Glide:  Sustained passive positioning test:    Palpation  [] Min  [x] Mod  [] Severe    Location: bilateral PSIS  [] Min  [] Mod  [] Severe    Location:  [] Min  [] Mod  [] Severe    Location:    Strength   L(0-5) R (0-5) N/T   Hip IR 3 3 []   Hip ER 3 3 []   Gluteus Capo 3+ 3+ []   Hamstring 4- 4- []     Other tests/comments:  -SLS: right/left 30 seconds     Pain Level (0-10 scale) post treatment: 7    ASSESSMENT/Changes in Function: See POC    Patient will continue to benefit from skilled PT services to modify and progress therapeutic interventions, address functional mobility deficits, address ROM deficits, address strength deficits, analyze and address soft tissue restrictions, analyze and cue movement patterns, analyze and modify body mechanics/ergonomics, assess and modify postural abnormalities, address imbalance/dizziness, and instruct in home and community integration to attain remaining goals.      [x]  See Plan of Care  []  See progress note/recertification  []  See Discharge Summary         Progress towards goals / Updated goals:  See POC    PLAN  []  Upgrade activities as tolerated     [x]  Continue plan of care  []  Update interventions per flow sheet       []  Discharge due to:_  []  Other:_      Claudio Banda, PT 9/6/2022  12:35 PM

## 2022-09-06 NOTE — PROGRESS NOTES
[facility]  [address] [facility] [address]  [place], [address]  [phone] fax    Plan of Care/ Statement of Necessity for Physical Therapy Services    Patient name: Margie Kendrick Start of Care: 2022   Referral source: Pretty Mccabe MD : 1955    Medical Diagnosis: Other low back pain [M54.59]  Lumbar back pain [M54.50]  Payor: Chitra Calderon / Plan: Evaristo Frank / Product Type: Managed Care Medicare /  Onset Date:2022; ongoing    Treatment Diagnosis: Back pain   Prior Hospitalization: see medical history Provider#: [id number]   Medications: Verified on Patient summary List    Comorbidities: depression, osteoporosis, arthritis, thyroid problems, pamphigus vulgaris, high blood pressure, 2 left meniscus repairs   Prior Level of Function: independent with ADLs      The Plan of Care and following information is based on the information from the initial evaluation. Assessment/ key information: Pt is a 78 y.o. female who presents with c/o chronic low back pain ongoing over the past few years but progressively getting worse. Functional deficits include: decreased walking tolerance and decreased ability performing daily activities. Upon exam, Pt exhibited tenderness to palpation over bilateral PSIS, decreased LE strength and decreased lumbar ROM. Pt would benefit from skilled PT to address above deficits to improve Pt's function and ability to return to ease of performing daily activities and household chores. Evaluation Complexity History MEDIUM  Complexity : 1-2 comorbidities / personal factors will impact the outcome/ POC ; Examination MEDIUM Complexity : 3 Standardized tests and measures addressing body structure, function, activity limitation and / or participation in recreation  ;Presentation MEDIUM Complexity : Evolving with changing characteristics  ; Clinical Decision Making MEDIUM Complexity : FOTO score of 26-74  Overall Complexity Rating: MEDIUM  Problem List: pain affecting function, decrease ROM, decrease strength, edema affecting function, impaired gait/ balance, decrease ADL/ functional abilitiies, decrease activity tolerance, decrease flexibility/ joint mobility, and decrease transfer abilities   Treatment Plan may include any combination of the following: Therapeutic exercise, Therapeutic activities, Neuromuscular re-education, Physical agent/modality, Gait/balance training, Manual therapy, Patient education, and Self Care training  Patient / Family readiness to learn indicated by: asking questions, trying to perform skills, and interest  Persons(s) to be included in education: patient (P)  Barriers to Learning/Limitations: None  Patient Goal (s): I want to decrease my pain.   Patient Self Reported Health Status: fair  Rehabilitation Potential: good    Short Term Goals: To be accomplished in 1 week  - Goal: Pt to be compliant with initial HEP to improve ease of performing daily activities  Status at last note/certification: Established and reviewed with Pt  Long Term Goals: To be accomplished in 10 treatments  - Goal: Pt to report < 5/10 pain at worst to increase ease with ADLs. Status at last note/certification: 09/22 pain at worst  - Goal: Pt to report at least 75 % improvement in overall symptoms to perform ease of performing household chores and activities. Status at last note/certification: N/A  - Goal: Pt to demonstrate 4+/5 gross LE MMT to increase ease of performing functional activities with increased strength  Status at last note/certification:    U(0-7) R (0-5) N/T   Hip IR 3 3 []    Hip ER 3 3 []    Gluteus Capo 3+ 3+ []    Hamstring 4- 4-      - Goal: Pt to report FOTO score of at least 56 pts to demonstrate improved function and quality of life.   Status at last note/certification: FOTO 42 pts   -Goal: Patient will be able to lift and carry 15-20# with proper body mechanics to carry groceries. Status at last note/certification: not tested    Frequency / Duration: Patient to be seen 2 times per week for 5 weeks. Patient/ Caregiver education and instruction: Diagnosis, prognosis, self care, activity modification, and exercises   [x]  Plan of care has been reviewed with PTA    Certification Period: 9/6/2022-10/5/2022  Haile Palmer, PT 9/6/2022 2:10 PM    ________________________________________________________________________    I certify that the above Therapy Services are being furnished while the patient is under my care. I agree with the treatment plan and certify that this therapy is necessary.     [de-identified] Signature:____________________  Date:____________Time: _________     Estefania Whiting MD  Please sign and return to [facility] [address] [place] [address] & [facility]  [address] [facility] [address]  [place], [facility] [address]  [phone] fax

## 2022-09-12 ENCOUNTER — HOSPITAL ENCOUNTER (OUTPATIENT)
Dept: PHYSICAL THERAPY | Age: 67
Discharge: HOME | End: 2022-09-12
Payer: MEDICARE

## 2022-09-12 PROCEDURE — 97110 THERAPEUTIC EXERCISES: CPT

## 2022-09-12 PROCEDURE — 97140 MANUAL THERAPY 1/> REGIONS: CPT

## 2022-09-12 PROCEDURE — 97112 NEUROMUSCULAR REEDUCATION: CPT

## 2022-09-12 NOTE — PROGRESS NOTES
PT DAILY TREATMENT NOTE     Patient Name: Kinjal Teran  Date:2022  : 1955  [x]  Patient  Verified  Payor: Shannon Purcell / Plan: Monique Iza / Product Type: Managed Care Medicare /    In time:12:14  Out time:1:14  Total Treatment Time (min): 60  Visit #: 2 of 10    Medicare/BCBS Only   Total Timed Codes (min):  50 1:1 Treatment Time:  50       Treatment Area: Other low back pain [M54.59]  Lumbar back pain [M54.50]    SUBJECTIVE  Pain Level (0-10 scale):  2  Any medication changes, allergies to medications, adverse drug reactions, diagnosis change, or new procedure performed?: [x] No    [] Yes (see summary sheet for update)  Subjective functional status/changes:   [] No changes reported  Pt reports pain is mainly in the right hip but it sometimes travels to the left    OBJECTIVE    Modality rationale: decrease pain to improve the patients ability to perform daily tasks   Min Type Additional Details    [] Estim:  []Unatt       []IFC  []Premod                        []Other:  []w/ice   []w/heat  Position:  Location:    [] Estim: []Att    []TENS instruct  []NMES                    []Other:  []w/US   []w/ice   []w/heat  Position:  Location:    []  Traction: [] Cervical       []Lumbar                       [] Prone          []Supine                       []Intermittent   []Continuous Lbs:  [] before manual  [] after manual    []  Ultrasound: []Continuous   [] Pulsed                           []1MHz   []3MHz W/cm2:  Location:    []  Iontophoresis with dexamethasone         Location: [] Take home patch   [] In clinic   10 []  Ice     [x]  heat  []  Ice massage  []  Laser   []  Anodyne Position: S/L  Location: right hip    []  Laser with stim  []  Other:  Position:  Location:    []  Vasopneumatic Device    []  Right     []  Left  Pre-treatment girth:  Post-treatment girth:  Measured at (location):  Pressure:       [] lo [] med [] hi   Temperature: [] lo [] med [] hi   [] Skin assessment post-treatment:  []intact []redness- no adverse reaction    []redness - adverse reaction:       27 min Therapeutic Exercise:  [x] See flow sheet :   Rationale: increase ROM and increase strength to improve the patients ability to perform ADLs    15 min Neuromuscular Re-education:  [x]  See flow sheet :   Rationale: increase strength, improve coordination, and increase proprioception  to improve the patients ability to perform functional tasks    8 min Manual Therapy:  In S/L, STM/DTM to right upper glutes, piri, Q/L release   The manual therapy interventions were performed at a separate and distinct time from the therapeutic activities interventions. Rationale: decrease pain, increase ROM, and increase tissue extensibility to improve functional mobility             With   [] TE   [] TA   [] neuro   [] other: Patient Education: [x] Review HEP    [] Progressed/Changed HEP based on:   [] positioning   [] body mechanics   [] transfers   [] heat/ice application    [] other:      Other Objective/Functional Measures:   First visit after Eval     Pain Level (0-10 scale) post treatment: 0    ASSESSMENT/Changes in Function: Initiated treatment program per flow sheet. Pt reports compliance with HEP. Vc's associated with performing new therex. TTP @ right piri and Q/L. Pt is highly motivated and demo's good understanding of technique. No pain after treatment. Patient will continue to benefit from skilled PT services to modify and progress therapeutic interventions, address functional mobility deficits, address ROM deficits, address strength deficits, analyze and address soft tissue restrictions, analyze and cue movement patterns, analyze and modify body mechanics/ergonomics, and assess and modify postural abnormalities to attain remaining goals. []  See Plan of Care  []  See progress note/recertification  []  See Discharge Summary         Progress towards goals / Updated goals:  Short Term Goals:  To be accomplished in 1 week  - Goal: Pt to be compliant with initial HEP to improve ease of performing daily activities  Status at last note/certification: Established and reviewed with Pt  Current:   Long Term Goals: To be accomplished in 10 treatments  - Goal: Pt to report < 5/10 pain at worst to increase ease with ADLs. Status at last note/certification: 52/79 pain at worst  - Goal: Pt to report at least 75 % improvement in overall symptoms to perform ease of performing household chores and activities. Status at last note/certification: N/A  - Goal: Pt to demonstrate 4+/5 gross LE MMT to increase ease of performing functional activities with increased strength  Status at last note/certification:     E(7-2) R (0-5) N/T   Hip IR 3 3 []    Hip ER 3 3 []    Gluteus Capo 3+ 3+ []    Hamstring 4- 4-        - Goal: Pt to report FOTO score of at least 56 pts to demonstrate improved function and quality of life. Status at last note/certification: FOTO 42 pts   -Goal: Patient will be able to lift and carry 15-20# with proper body mechanics to carry groceries.   Status at last note/certification: not tested      PLAN  []  Upgrade activities as tolerated     [x]  Continue plan of care  []  Update interventions per flow sheet       []  Discharge due to:_  []  Other:_      Yumiko Francois, PTA 9/12/2022  11:56 AM    Future Appointments   Date Time Provider Paulette Tello   9/12/2022 12:15 PM Princess Dorantes, PTA [facility]   9/14/2022 12:45 PM Yousif Marshall, PT [facility]   9/19/2022  1:00 PM Yousif Marshall, PT [facility]   9/21/2022 10:45 AM Adalberto Knapp, PT [facility]   9/26/2022 10:30 AM Miriam Grossman [facility]   9/28/2022 10:00 AM Miriam Grossman [facility]   10/3/2022 10:30 AM Harris Reginaldo[address] [facility]   10/5/2022 10:00 AM Miriam Grossman [facility]

## 2022-09-14 ENCOUNTER — HOSPITAL ENCOUNTER (OUTPATIENT)
Dept: PHYSICAL THERAPY | Age: 67
Discharge: HOME | End: 2022-09-14
Payer: MEDICARE

## 2022-09-14 PROCEDURE — 97110 THERAPEUTIC EXERCISES: CPT

## 2022-09-14 PROCEDURE — 97140 MANUAL THERAPY 1/> REGIONS: CPT

## 2022-09-14 PROCEDURE — 97112 NEUROMUSCULAR REEDUCATION: CPT

## 2022-09-14 NOTE — PROGRESS NOTES
PT DAILY TREATMENT NOTE     Patient Name: Josep Garrett  Date:2022  : 1955  [x]  Patient  Verified  Payor: Hayes Curling / Plan: [facility] / Product Type: Managed Care Medicare /    In time:1245pm  Out time:150pm  Total Treatment Time (min): 65  Visit #: 3 of 10    Medicare/BCBS Only   Total Timed Codes (min):  65 1:1 Treatment Time:  53       Treatment Area: Other low back pain [M54.59]  Lumbar back pain [M54.50]    SUBJECTIVE  Pain Level (0-10 scale): 3-4   Any medication changes, allergies to medications, adverse drug reactions, diagnosis change, or new procedure performed?: [x] No    [] Yes (see summary sheet for update)  Subjective functional status/changes:   [] No changes reported  Reports increased pain yesterday and using lidocaine patches. OBJECTIVE    Modality rationale: decrease pain and increase tissue extensibility to improve the patients ability to manage ADLs. Min Type Additional Details   10 []  Ice     [x]  heat  []  Ice massage  []  Laser   []  Anodyne Position:left S/L  Location:right hip   [] Skin assessment post-treatment:  []intact []redness- no adverse reaction    []redness - adverse reaction:     15 min Therapeutic Exercise:  [x] See flow sheet :   Rationale: increase ROM and increase strength to improve the patients ability to perform ADLs      30 min Neuromuscular Re-education:  [x]  See flow sheet :   Rationale: increase strength, improve coordination, and increase proprioception  to improve the patients ability to perform functional tasks     8 min Manual Therapy:  In S/L, STM/DTM to right upper glutes, piri, Q/L release   The manual therapy interventions were performed at a separate and distinct time from the therapeutic activities interventions.   Rationale: decrease pain, increase ROM, and increase tissue extensibility to improve functional mobility        With   [] TE   [] TA   [] neuro   [] other: Patient Education: [x] Review HEP    [] Progressed/Changed HEP based on:   [] positioning   [] body mechanics   [] transfers   [] heat/ice application    [] other:      Other Objective/Functional Measures: added trapeze bar and dowel hip hinging     Pain Level (0-10 scale) post treatment: 0    ASSESSMENT/Changes in Function: Pt requires tactile and verbal cueing to teach proper hip hinge and anterior/posterior pelvic tilting to isolate the pelvic from the thoracolumbar spine. Pt demonstrates thoracic and lumbar extension pattern when trying to mimic good posture with exercises. Therapist educated on neutral spine. Patient will continue to benefit from skilled PT services to modify and progress therapeutic interventions, address functional mobility deficits, address ROM deficits, address strength deficits, analyze and address soft tissue restrictions, analyze and cue movement patterns, analyze and modify body mechanics/ergonomics, assess and modify postural abnormalities, address imbalance/dizziness, and instruct in home and community integration to attain remaining goals. []  See Plan of Care  []  See progress note/recertification  []  See Discharge Summary         Progress towards goals / Updated goals:  Short Term Goals: To be accomplished in 1 week  1. Pt to be compliant with initial HEP to improve ease of performing daily activities  Eval: Established and reviewed with Pt  Current: met (9/14/2022)  Long Term Goals: To be accomplished in 10 treatments  1. Pt to report < 5/10 pain at worst to increase ease with ADLs. Eval: 10/10 pain at worst  2. Pt to report at least 75 % improvement in overall symptoms to perform ease of performing household chores and activities. Eval: N/A  3. Pt to demonstrate 4+/5 gross LE MMT to increase ease of performing functional activities with increased strength  Eval: grossly 3/5 hip IR/ER, glute max 3+/5, and HS 4-/5 B    4.  Pt to report FOTO score of at least 56 pts to demonstrate improved function and quality of life. Eval: FOTO 42 pts   5. Patient will be able to lift and carry 15-20# with proper body mechanics to carry groceries.   Eval: not tested     PLAN  []  Upgrade activities as tolerated     [x]  Continue plan of care  []  Update interventions per flow sheet       []  Discharge due to:_  []  Other:_      Uriah Buckner, PT 9/14/2022  1:03 PM    Future Appointments   Date Time Provider Paulette Tello   9/19/2022  1:00 PM Jorge Galdamez, PT [facility]   9/21/2022 10:45 AM Compton, Kenn Spurling, PT [facility]   9/26/2022 10:30 AM Rickey Booker [facility]   9/28/2022 10:00 AM Rickey Booker [facility]   10/3/2022 10:30 AM Rickey Booker [facility]   10/5/2022 10:00 AM Rickey Booker [facility]

## 2022-09-19 ENCOUNTER — HOSPITAL ENCOUNTER (OUTPATIENT)
Dept: PHYSICAL THERAPY | Age: 67
Discharge: HOME | End: 2022-09-19
Payer: MEDICARE

## 2022-09-19 PROCEDURE — 97110 THERAPEUTIC EXERCISES: CPT

## 2022-09-19 PROCEDURE — 97112 NEUROMUSCULAR REEDUCATION: CPT

## 2022-09-19 PROCEDURE — 97140 MANUAL THERAPY 1/> REGIONS: CPT

## 2022-09-19 NOTE — PROGRESS NOTES
PT DAILY TREATMENT NOTE     Patient Name: Adalid Bates  Date:2022  : 1955  [x]  Patient  Verified  Payor: Burt Metro / Plan: Serafin Quan / Product Type: Managed Care Medicare /    In time:101pm  Out time:204pm  Total Treatment Time (min): 63  Visit #: 4 of 10    Medicare/BCBS Only   Total Timed Codes (min):  63 1:1 Treatment Time:  55       Treatment Area: Other low back pain [M54.59]  Lumbar back pain [M54.50]    SUBJECTIVE  Pain Level (0-10 scale): 3-4  Any medication changes, allergies to medications, adverse drug reactions, diagnosis change, or new procedure performed?: [x] No    [] Yes (see summary sheet for update)  Subjective functional status/changes:   [] No changes reported  Woke up today with it really hurting. Doesn't know why. OBJECTIVE    Modality rationale: decrease pain and increase tissue extensibility to improve the patients ability to manage ADLs. Min Type Additional Details   10 []  Ice     [x]  heat  []  Ice massage  []  Laser   []  Anodyne Position: left S/l  Location: right hip   [] Skin assessment post-treatment:  []intact []redness- no adverse reaction    []redness - adverse reaction:   15 min Therapeutic Exercise:  [x] See flow sheet :   Rationale: increase ROM and increase strength to improve the patients ability to perform ADLs      32 min Neuromuscular Re-education:  [x]  See flow sheet :   Rationale: increase strength, improve coordination, and increase proprioception  to improve the patients ability to perform functional tasks     8 min Manual Therapy:  In S/L, STM/DTM to right upper glutes, piri, Q/L release   The manual therapy interventions were performed at a separate and distinct time from the therapeutic activities interventions.   Rationale: decrease pain, increase ROM, and increase tissue extensibility to improve functional mobility           With   [] TE   [] TA   [] neuro   [] other: Patient Education: [x] Review HEP [] Progressed/Changed HEP based on:   [] positioning   [] body mechanics   [] transfers   [] heat/ice application    [] other:      Other Objective/Functional Measures: exercises per flowsheet     Pain Level (0-10 scale) post treatment: 2    ASSESSMENT/Changes in Function: Pt gets quick fatigue with her BUE when using the trapeze, so 1 blue spring was removed half way through. Requires continual postural corrections to reduce thoracic extension, lumbar extension, and anterior pelvic tilt. Supine and 1/2 prone exercises performed well without stability concern. Pt inquires about dry needling for LBP/SIJ with MCR and is instructed to discuss this with Toney Jain next week. Patient will continue to benefit from skilled PT services to modify and progress therapeutic interventions, address functional mobility deficits, address ROM deficits, address strength deficits, analyze and address soft tissue restrictions, analyze and cue movement patterns, analyze and modify body mechanics/ergonomics, assess and modify postural abnormalities, address imbalance/dizziness, and instruct in home and community integration to attain remaining goals. []  See Plan of Care  []  See progress note/recertification  []  See Discharge Summary         Progress towards goals / Updated goals:  Short Term Goals: To be accomplished in 1 week  1. Pt to be compliant with initial HEP to improve ease of performing daily activities  Eval: Established and reviewed with Pt  Current: met (9/14/2022)  Long Term Goals: To be accomplished in 10 treatments  1. Pt to report < 5/10 pain at worst to increase ease with ADLs. Eval: 10/10 pain at worst   Current: 7/10 (9/19/2022)  2. Pt to report at least 75% improvement in overall symptoms to perform ease of performing household chores and activities. Eval: N/A   3.  Pt to demonstrate 4+/5 gross LE MMT to increase ease of performing functional activities with increased strength  Eval: grossly 3/5 hip IR/ER, glute max 3+/5, and HS 4-/5 B    4. Pt to report FOTO score of at least 56 pts to demonstrate improved function and quality of life. Eval: FOTO 42 pts   5. Patient will be able to lift and carry 15-20# with proper body mechanics to carry groceries.   Eval: not tested     PLAN  []  Upgrade activities as tolerated     [x]  Continue plan of care  []  Update interventions per flow sheet       []  Discharge due to:_  []  Other:_      Davide Alejandre PT 9/19/2022  1:11 PM    Future Appointments   Date Time Provider Paulette Tello   9/21/2022 10:45 AM [facility]   9/26/2022 10:30 AM [facility]   9/28/2022 10:00 AM [facility]   10/3/2022 10:30 AM Mejia Morillows [facility]   10/5/2022 10:00 AM [facility]

## 2022-09-21 ENCOUNTER — HOSPITAL ENCOUNTER (OUTPATIENT)
Dept: PHYSICAL THERAPY | Age: 67
Discharge: HOME | End: 2022-09-21
Payer: MEDICARE

## 2022-09-21 PROCEDURE — 97110 THERAPEUTIC EXERCISES: CPT

## 2022-09-21 PROCEDURE — 97140 MANUAL THERAPY 1/> REGIONS: CPT

## 2022-09-21 PROCEDURE — 97112 NEUROMUSCULAR REEDUCATION: CPT

## 2022-09-21 NOTE — PROGRESS NOTES
PT DAILY TREATMENT NOTE     Patient Name: Cira Whalen  Date:2022  : 1955  [x]  Patient  Verified  Payor: Shadiasophia Bobbyjose juan / Plan: [employer]  / Product Type: Managed Care Medicare /    In time:10:47  Out time:11:42  Total Treatment Time (min): 55  Visit #: 5 of 10    Medicare/BCBS Only   Total Timed Codes (min):  45 1:1 Treatment Time:  45       Treatment Area: Other low back pain [M54.59]  Lumbar back pain [M54.50]    SUBJECTIVE  Pain Level (0-10 scale): 2/10  Any medication changes, allergies to medications, adverse drug reactions, diagnosis change, or new procedure performed?: [x] No    [] Yes (see summary sheet for update)  Subjective functional status/changes:   [] No changes reported  The patient reports that her pain remains through her SIJ region on the right. She states it's a little better now, but it was quite painful earlier after a walk. She is disappointed that the MRI is not covered. OBJECTIVE  Modality rationale: decrease pain and increase tissue extensibility to improve the patients ability to improve ADL ease. Min Type Additional Details   10 []  Ice     [x]  heat  []  Ice massage  []  Laser   []  Anodyne Position: seated  Location: lumbar spine/SIJ region   [] Skin assessment post-treatment:  []intact []redness- no adverse reaction    []redness - adverse reaction:     22 min Therapeutic Exercise:  [x] See flow sheet :   Rationale: increase ROM and increase strength to improve the patients ability to improve ADL ease. 15 min Neuromuscular Re-education:  [x]  See flow sheet :   Rationale: increase ROM and increase strength  to improve the patients ability to improve ADL ease. 8 min Manual Therapy:  SIJ mobs posteriorly with the patient in prone through right grade II - III. [place] through clothes GT-4 through glute region medially into sacral region with the patient in prone.     The manual therapy interventions were performed at a separate and distinct time from the therapeutic activities interventions. Rationale: decrease pain, increase ROM, and increase tissue extensibility to improve ADL ease. With   [] TE   [] TA   [] neuro   [] other: Patient Education: [x] Review HEP    [] Progressed/Changed HEP based on:   [] positioning   [] body mechanics   [] transfers   [] heat/ice application    [] other:      Other Objective/Functional Measures:   Level SIJ upon assessment found to be WNL. Pain Level (0-10 scale) post treatment: 1/10    ASSESSMENT/Changes in Function: The patient continues to get most pain with closed chain activity through her right lower back and SIJ region after prolonged walking. She was able to tolerate increased resistances well without increase in pain. Patient will continue to benefit from skilled PT services to modify and progress therapeutic interventions, address functional mobility deficits, address ROM deficits, address strength deficits, analyze and address soft tissue restrictions, analyze and cue movement patterns, analyze and modify body mechanics/ergonomics, assess and modify postural abnormalities, and instruct in home and community integration to attain remaining goals. []  See Plan of Care  []  See progress note/recertification  []  See Discharge Summary         Progress towards goals / Updated goals:  Short Term Goals: To be accomplished in 1 week  1. Pt to be compliant with initial HEP to improve ease of performing daily activities  Eval: Established and reviewed with Pt  Current: met (9/14/2022)  Long Term Goals: To be accomplished in 10 treatments  1. Pt to report < 5/10 pain at worst to increase ease with ADLs. Eval: 10/10 pain at worst  Current: 7/10 (9/19/2022)  2. Pt to report at least 75% improvement in overall symptoms to perform ease of performing household chores and activities. Eval: N/A   3.  Pt to demonstrate 4+/5 gross LE MMT to increase ease of performing functional activities with increased strength  Eval: grossly 3/5 hip IR/ER, glute max 3+/5, and HS 4-/5 B    4. Pt to report FOTO score of at least 56 pts to demonstrate improved function and quality of life. Eval: FOTO 42 pts   5. Patient will be able to lift and carry 15-20# with proper body mechanics to carry groceries.   Eval: not tested    PLAN  [x]  Upgrade activities as tolerated     []  Continue plan of care  []  Update interventions per flow sheet       []  Discharge due to:_  []  Other:_      Pauline Mcmullen, PT 9/21/2022  11:08 AM    Future Appointments   Date Time Provider Paulette Tello   9/26/2022 10:30 AM Thiago Singh [facility]   9/28/2022 10:00 AM Mejia Martins [facility]   [facility]2022 10:[facility]   [facility]2022 [facility]00 [facility]

## 2022-09-26 ENCOUNTER — HOSPITAL ENCOUNTER (OUTPATIENT)
Dept: PHYSICAL THERAPY | Age: 67
Discharge: HOME | End: 2022-09-26
Payer: MEDICARE

## 2022-09-26 PROCEDURE — 97110 THERAPEUTIC EXERCISES: CPT

## 2022-09-26 PROCEDURE — 97140 MANUAL THERAPY 1/> REGIONS: CPT

## 2022-09-26 PROCEDURE — 97112 NEUROMUSCULAR REEDUCATION: CPT

## 2022-09-26 NOTE — PROGRESS NOTES
PT DAILY TREATMENT NOTE     Patient Name: Angle Dietz  Date:2022  : 1955  [x]  Patient  Verified  Payor: Thony Queen / Plan: Antonio Hunter / Product Type: Managed Care Medicare /    In time:10:35  Out time:11:30  Total Treatment Time (min): 55  Visit #: 6 of 8    Medicare/BCBS Only   Total Timed Codes (min):  45 1:1 Treatment Time:  45       Treatment Area:  Other low back pain [M54.59]  Lumbar back pain [M54.50]    SUBJECTIVE  Pain Level (0-10 scale): 1  Any medication changes, allergies to medications, adverse drug reactions, diagnosis change, or new procedure performed?: [x] No    [] Yes (see summary sheet for update)  Subjective functional status/changes:   [] No changes reported  The pt reports fairly good pain levels today though is aggravated her MRI was denied coverage    OBJECTIVE    Modality rationale: decrease pain and increase tissue extensibility to improve the patients ability to perform ADLs   Min Type Additional Details    [] Estim:  []Unatt       []IFC  []Premod                        []Other:  []w/ice   []w/heat  Position:  Location:    [] Estim: []Att    []TENS instruct  []NMES                    []Other:  []w/US   []w/ice   []w/heat  Position:  Location:    []  Traction: [] Cervical       []Lumbar                       [] Prone          []Supine                       []Intermittent   []Continuous Lbs:  [] before manual  [] after manual    []  Ultrasound: []Continuous   [] Pulsed                           []1MHz   []3MHz W/cm2:  Location:    []  Iontophoresis with dexamethasone         Location: [] Take home patch   [] In clinic   10 []  Ice     [x]  heat  []  Ice massage  []  Laser   []  Anodyne Position: seated  Location: right hip/lumbar    []  Laser with stim  []  Other:  Position:  Location:    []  Vasopneumatic Device    []  Right     []  Left  Pre-treatment girth:  Post-treatment girth:  Measured at (location):  Pressure:       [] lo [] med [] hi   Temperature: [] lo [] med [] hi   [] Skin assessment post-treatment:  []intact []redness- no adverse reaction    []redness - adverse reaction:       16 min Therapeutic Exercise:  [] See flow sheet :   Rationale: increase ROM and increase strength to improve the patients ability to perform daily tasks     21 min Neuromuscular Re-education:  []  See flow sheet :   Rationale: increase strength, improve coordination, and increase proprioception  to improve the patients ability to perform functional tasks with improved lumbopelvic mechanics    8 min Manual Therapy:  B SIJ PAs and inferior glides in prone grade II-III, lumbar PAs grade II-III L4-5   The manual therapy interventions were performed at a separate and distinct time from the therapeutic activities interventions. Rationale: decrease pain, increase ROM, and increase tissue extensibility to improve ease of ADL performance             With   [] TE   [] TA   [] neuro   [] other: Patient Education: [x] Review HEP    [] Progressed/Changed HEP based on:   [] positioning   [] body mechanics   [] transfers   [] heat/ice application    [] other:      Other Objective/Functional Measures:  Minimal soft tissue restrictions noted through right glutes, more so in right lumbar paraspinals    Pain Level (0-10 scale) post treatment: 0    ASSESSMENT/Changes in Function: The pt demonstrates good response to exercise today, does report some fatigue and mild tension through right lateral hip following activity. Will continue with gradual load tolerance as tolerated. Patient will continue to benefit from skilled PT services to modify and progress therapeutic interventions, address functional mobility deficits, address ROM deficits, address strength deficits, analyze and address soft tissue restrictions, analyze and cue movement patterns, and analyze and modify body mechanics/ergonomics to attain remaining goals.      []  See Plan of Care  []  See progress note/recertification  []  See Discharge Summary         Progress towards goals / Updated goals:  Short Term Goals: To be accomplished in 1 week  1. Pt to be compliant with initial HEP to improve ease of performing daily activities  Eval: Established and reviewed with Pt  Current: met (9/14/2022)  Long Term Goals: To be accomplished in 10 treatments  1. Pt to report < 5/10 pain at worst to increase ease with ADLs. Eval: 10/10 pain at worst  Current: 7/10 (9/19/2022)  2. Pt to report at least 75% improvement in overall symptoms to perform ease of performing household chores and activities. Eval: N/A   Current: pt reports 30-40% improvement 9/26/2022  3. Pt to demonstrate 4+/5 gross LE MMT to increase ease of performing functional activities with increased strength  Eval: grossly 3/5 hip IR/ER, glute max 3+/5, and HS 4-/5 B    4. Pt to report FOTO score of at least 56 pts to demonstrate improved function and quality of life. Eval: FOTO 42 pts   5. Patient will be able to lift and carry 15-20# with proper body mechanics to carry groceries.   Eval: not tested    PLAN  [x]  Upgrade activities as tolerated     []  Continue plan of care  []  Update interventions per flow sheet       []  Discharge due to:_  []  Other:_      Mahendra Cruz DPT C[facility] 9/26/2022  10:42 AM    Future Appointments   Date Time Provider Paulette Tello   9/28/2022 10:00 AM [facility], [facility]   10/3/2022 10:30 AM Harris, [facility] [employer] [facility]   10/5/2022 10:00 [facility]

## 2022-09-28 ENCOUNTER — HOSPITAL ENCOUNTER (OUTPATIENT)
Dept: PHYSICAL THERAPY | Age: 67
Discharge: HOME | End: 2022-09-28
Payer: MEDICARE

## 2022-09-28 PROCEDURE — 97110 THERAPEUTIC EXERCISES: CPT

## 2022-09-28 PROCEDURE — 97112 NEUROMUSCULAR REEDUCATION: CPT

## 2022-09-28 PROCEDURE — 97140 MANUAL THERAPY 1/> REGIONS: CPT

## 2022-09-28 NOTE — PROGRESS NOTES
PT DAILY TREATMENT NOTE     Patient Name: Edwin Brown  Date:2022  : 1955  [x]  Patient  Verified  Payor: Clive Rangel / Plan: Parth Sen / Product Type: Managed Care Medicare /    In time:10:02  Out time:11:04  Total Treatment Time (min): 62  Visit #: 7 of 8    Medicare/BCBS Only   Total Timed Codes (min):  52 1:1 Treatment Time:  52       Treatment Area:  Other low back pain [M54.59]  Lumbar back pain [M54.50]    SUBJECTIVE  Pain Level (0-10 scale): 0.5  Any medication changes, allergies to medications, adverse drug reactions, diagnosis change, or new procedure performed?: [x] No    [] Yes (see summary sheet for update)  Subjective functional status/changes:   [] No changes reported  The pt reports she is doing pretty well, has been busy this morning    OBJECTIVE    Modality rationale: decrease pain and increase tissue extensibility to improve the patients ability to perform ADLs   Min Type Additional Details    [] Estim:  []Unatt       []IFC  []Premod                        []Other:  []w/ice   []w/heat  Position:  Location:    [] Estim: []Att    []TENS instruct  []NMES                    []Other:  []w/US   []w/ice   []w/heat  Position:  Location:    []  Traction: [] Cervical       []Lumbar                       [] Prone          []Supine                       []Intermittent   []Continuous Lbs:  [] before manual  [] after manual    []  Ultrasound: []Continuous   [] Pulsed                           []1MHz   []3MHz W/cm2:  Location:    []  Iontophoresis with dexamethasone         Location: [] Take home patch   [] In clinic   10 []  Ice     [x]  heat  []  Ice massage  []  Laser   []  Anodyne Position: seated  Location: lumbar/hip    []  Laser with stim  []  Other:  Position:  Location:    []  Vasopneumatic Device    []  Right     []  Left  Pre-treatment girth:  Post-treatment girth:  Measured at (location):  Pressure:       [] lo [] med [] hi   Temperature: [] lo [] med [] hi   [] Skin assessment post-treatment:  []intact []redness- no adverse reaction    []redness - adverse reaction:         19 min Therapeutic Exercise:  [] See flow sheet :   Rationale: increase ROM and increase strength to improve the patients ability to perform daily tasks      23 min Neuromuscular Re-education:  []  See flow sheet :   Rationale: increase strength, improve coordination, and increase proprioception  to improve the patients ability to perform functional tasks with improved lumbopelvic mechanics and stability    10 min Manual Therapy:  STM right lumbar paraspinals and proximal glues in prone with inferior glides of right SIJ   The manual therapy interventions were performed at a separate and distinct time from the therapeutic activities interventions. Rationale: decrease pain and increase tissue extensibility to improve ease of ADLs       With   [] TE   [] TA   [] neuro   [] other: Patient Education: [x] Review HEP    [] Progressed/Changed HEP based on:   [] positioning   [] body mechanics   [] transfers   [] heat/ice application    [] other:      Other Objective/Functional Measures:      Pain Level (0-10 scale) post treatment: 0    ASSESSMENT/Changes in Function: The pt demonstrates improving pain levels and improving ability to load glutes in clinic. Will continue with glute and core stability work increasing activity if pain continues to decrease    Patient will continue to benefit from skilled PT services to modify and progress therapeutic interventions, address functional mobility deficits, address ROM deficits, address strength deficits, analyze and address soft tissue restrictions, analyze and cue movement patterns, and analyze and modify body mechanics/ergonomics to attain remaining goals. []  See Plan of Care  []  See progress note/recertification  []  See Discharge Summary         Progress towards goals / Updated goals:  Short Term Goals: To be accomplished in 1 week  1.  Pt to be compliant with initial HEP to improve ease of performing daily activities  Eval: Established and reviewed with Pt  Current: met (9/14/2022)  Long Term Goals: To be accomplished in 10 treatments  1. Pt to report < 5/10 pain at worst to increase ease with ADLs. Eval: 10/10 pain at worst  Current: 7/10 (9/19/2022)  2. Pt to report at least 75% improvement in overall symptoms to perform ease of performing household chores and activities. Eval: N/A   Current: pt reports 30-40% improvement 9/26/2022  3. Pt to demonstrate 4+/5 gross LE MMT to increase ease of performing functional activities with increased strength  Eval: grossly 3/5 hip IR/ER, glute max 3+/5, and HS 4-/5 B    4. Pt to report FOTO score of at least 56 pts to demonstrate improved function and quality of life. Eval: FOTO 42 pts   5. Patient will be able to lift and carry 15-20# with proper body mechanics to carry groceries.   Eval: not tested  Current: initiated 15# squat lift, no carries 9/28/2022    PLAN  []  Upgrade activities as tolerated     []  Continue plan of care  []  Update interventions per flow sheet       []  Discharge due to:_  []  Other:_      Tan Flynn DPT [facility] 9/28/2022  10:12 AM    Future Appointments   Date Time Provider Paulette Tello   10/3/2022 10:30 AM Reginaldo Iglesias[address] [facility]   10/5/2022 10:00 [facility]

## 2022-10-03 ENCOUNTER — HOSPITAL ENCOUNTER (OUTPATIENT)
Dept: PHYSICAL THERAPY | Age: 67
Discharge: HOME | End: 2022-10-03
Payer: MEDICARE

## 2022-10-03 PROCEDURE — 97140 MANUAL THERAPY 1/> REGIONS: CPT

## 2022-10-03 PROCEDURE — 97112 NEUROMUSCULAR REEDUCATION: CPT

## 2022-10-03 PROCEDURE — 97110 THERAPEUTIC EXERCISES: CPT

## 2022-10-03 NOTE — PROGRESS NOTES
PT DAILY TREATMENT NOTE     Patient Name: Diamante Ackerman  Date:10/3/2022  : 1955  [x]  Patient  Verified  Payor: Ayana Carballo / Plan: Yosef Wolff / Product Type: Managed Care Medicare /    In time:10:30  Out time:11:27  Total Treatment Time (min): 57  Visit #: 8 of 8    Medicare/BCBS Only   Total Timed Codes (min):  47 1:1 Treatment Time:  47       Treatment Area: Other low back pain [M54.59]  Lumbar back pain [M54.50]    SUBJECTIVE  Pain Level (0-10 scale): 0  Any medication changes, allergies to medications, adverse drug reactions, diagnosis change, or new procedure performed?: [x] No    [] Yes (see summary sheet for update)  Subjective functional status/changes:   [] No changes reported  \"Not bad.  I'm not better but I don't hurt\"    OBJECTIVE    Modality rationale: decrease pain and increase tissue extensibility to improve the patients ability to perform ADLs   Min Type Additional Details    [] Estim:  []Unatt       []IFC  []Premod                        []Other:  []w/ice   []w/heat  Position:  Location:    [] Estim: []Att    []TENS instruct  []NMES                    []Other:  []w/US   []w/ice   []w/heat  Position:  Location:    []  Traction: [] Cervical       []Lumbar                       [] Prone          []Supine                       []Intermittent   []Continuous Lbs:  [] before manual  [] after manual    []  Ultrasound: []Continuous   [] Pulsed                           []1MHz   []3MHz W/cm2:  Location:    []  Iontophoresis with dexamethasone         Location: [] Take home patch   [] In clinic   10 []  Ice     [x]  heat  []  Ice massage  []  Laser   []  Anodyne Position: seated   Location:right hip/lumbar    []  Laser with stim  []  Other:  Position:  Location:    []  Vasopneumatic Device    []  Right     []  Left  Pre-treatment girth:  Post-treatment girth:  Measured at (location):  Pressure:       [] lo [] med [] hi   Temperature: [] lo [] med [] hi   [] Skin assessment post-treatment:  []intact []redness- no adverse reaction    []redness - adverse reaction:     16 min Therapeutic Exercise:  [] See flow sheet :   Rationale: increase ROM and increase strength to improve the patients ability to perform ADLs     23 min Neuromuscular Re-education:  []  See flow sheet :   Rationale: increase strength, improve coordination, and increase proprioception  to improve the patients ability to perform functional tasks with improved lumbopelvic mechanics and stability    8 min Manual Therapy:  STM proximal right glutes near sacral ala, grade II-III lumbar PAs   The manual therapy interventions were performed at a separate and distinct time from the therapeutic activities interventions. Rationale: decrease pain and increase tissue extensibility to improve ease of glute contraction and stability with ADLs            With   [] TE   [] TA   [] neuro   [] other: Patient Education: [x] Review HEP    [] Progressed/Changed HEP based on:   [] positioning   [] body mechanics   [] transfers   [] heat/ice application    [] other:      Other Objective/Functional Measures: improved hip strength noted bilaterally     Pain Level (0-10 scale) post treatment: 1 \"stiff\"    ASSESSMENT/Changes in Function: The pt does demonstrate much improved pain levels and improved hip strength since SOC. She reports still being limited with some functional activities like lifting, walking > 2.5 miles and falling asleep on her right side. Will attempt to improve these with further core stability and mechanics work over another 2-3 weeks of PT. Patient will continue to benefit from skilled PT services to modify and progress therapeutic interventions, address functional mobility deficits, address ROM deficits, address strength deficits, analyze and address soft tissue restrictions, analyze and cue movement patterns, and analyze and modify body mechanics/ergonomics to attain remaining goals.      []  See Plan of Care  [x]  See progress note/recertification  []  See Discharge Summary         Progress towards goals / Updated goals:  Short Term Goals: To be accomplished in 1 week  1. Pt to be compliant with initial HEP to improve ease of performing daily activities  Eval: Established and reviewed with Pt  Current: met (9/14/2022)  Long Term Goals: To be accomplished in 10 treatments  1. Pt to report < 5/10 pain at worst to increase ease with ADLs. Eval: 10/10 pain at worst  Current: 7/10 (9/19/2022)  2. Pt to report at least 75% improvement in overall symptoms to perform ease of performing household chores and activities. Eval: N/A   Current: pt reports 30-40% improvement 9/26/2022  3. Pt to demonstrate 4+/5 gross LE MMT to increase ease of performing functional activities with increased strength  Eval: grossly 3/5 hip IR/ER, glute max 3+/5, and HS 4-/5 B   Current: Met 4+/5 grossly bilateral 10/3/2022   4. Pt to report FOTO score of at least 56 pts to demonstrate improved function and quality of life. Eval: FOTO 42 pts   Current: increased to 50 10/3/2022  5. Patient will be able to lift and carry 15-20# with proper body mechanics to carry groceries.   Eval: not tested  Current: initiated 15# squat lift, no carries 9/28/2022    PLAN  [x]  Upgrade activities as tolerated     []  Continue plan of care  []  Update interventions per flow sheet       []  Discharge due to:_  []  Other:_      Stew Bonilla DPT [facility] 10/3/2022  10:34 AM    Future Appointments   Date Time Provider Paulette Tello   10/5/2022 10:00 AM [facility]order[id number]

## 2022-10-03 NOTE — PROGRESS NOTES
[facility]  [address] [facility] [address]  [place], [address]  [phone] fax    Continued Plan of Care/ Re-certification for Physical Therapy Services  Patient name: Vicky Mcknight Start of Care: 2022   Referral source: Nish Basilio MD : 1955               Medical Diagnosis: Other low back pain [M54.59]  Lumbar back pain [M54.50]  Payor: Gabi Goodson / Plan: Claudia Lopez / Product Type: Managed Care Medicare /  Onset Date:2022; ongoing               Treatment Diagnosis: Back pain   Prior Hospitalization: see medical history Provider#: [id number]   Medications: Verified on Patient summary List    Comorbidities: depression, osteoporosis, arthritis, thyroid problems, pamphigus vulgaris, high blood pressure, 2 left meniscus repairs   Prior Level of Function: independent with ADLs    Visits from Start of Care: 8    Missed Visits: 0    The Plan of Care and following information is based on the patient's current status:    Key functional changes:    Short Term Goals: To be accomplished in 1 week  1. Pt to be compliant with initial HEP to improve ease of performing daily activities  Eval: Established and reviewed with Pt  Current: met (2022)  Long Term Goals: To be accomplished in 10 treatments  1. Pt to report < 5/10 pain at worst to increase ease with ADLs. Eval: 10/10 pain at worst  Current: 7/10 (2022)  2. Pt to report at least 75% improvement in overall symptoms to perform ease of performing household chores and activities. Eval: N/A   Current: pt reports 30-40% improvement 2022  3. Pt to demonstrate 4+/5 gross LE MMT to increase ease of performing functional activities with increased strength  Eval: grossly 3/5 hip IR/ER, glute max 3+/5, and HS 4-/5 B   Current: Met 4+/5 grossly bilateral 10/3/2022   4. Pt to report FOTO score of at least 56 pts to demonstrate improved function and quality of life.   Eval: FOTO 42 pts   Current: increased to 50 10/3/2022  5. Patient will be able to lift and carry 15-20# with proper body mechanics to carry groceries. Eval: not tested  Current: initiated 15# squat lift, no carries 9/28/2022     Problems/ barriers to goal attainment:      Problem List: pain affecting function, decrease strength, decrease ADL/ functional abilitiies, decrease activity tolerance, and decrease flexibility/ joint mobility    Treatment Plan: Therapeutic exercise, Therapeutic activities, Neuromuscular re-education, Physical agent/modality, Gait/balance training, Manual therapy, Patient education, Self Care training, and Functional mobility training     Patient Goal (s) has been updated and includes: to walk and perform housework without hurting     Goals for this certification period to be accomplished in 3-4 weeks:  1. Pt to report ability to walk 3 miles without increase in back pain to improve exercise participation. PN: 2-2.5 miles  2. Pt to report at least 75% improvement in overall symptoms to perform ease of performing household chores and activities. PN: pt reports 30-40% improvement   3. Pt to report FOTO score of at least 56 pts to demonstrate improved function and quality of life. PN: increased to 50   4. Patient will be able to lift and carry 15-20# with proper body mechanics to carry groceries. PN: initiated 15# squat lift, no carries     Frequency / Duration: Patient to be seen 2 times per week for 3-4 weeks:    Assessment / Recommendations: The pt does demonstrate much improved pain levels and improved hip strength since [facility]. She reports still being limited with some functional activities like lifting, walking > 2.5 miles and falling asleep on her right side. Will attempt to improve these with further core stability and mechanics work over another 2-3 weeks of PT.     Certification Period: 10/5/2022 to 11/3/2022    Stacy Murrieta DPT CMTPT 10/3/2022 11:30 AM    ________________________________________________________________________  I certify that the above Therapy Services are being furnished while the patient is under my care. I agree with the treatment plan and certify that this therapy is necessary. [] I have read the above and request that my patient continue as recommended.   [] I have read the above report and request that my patient continue therapy with the following changes/special instructions: _____________________________________________  [] I have read the above report and request that my patient be discharged from therapy    Physician's Signature:____________Date:_________TIME:________     Kelley Alvarado MD  ** Signature, Date and Time must be completed for valid certification **    Please sign and return to [facility] [address] [place] [address] & [facility]  [address]  [place], [address]  [phone] fax

## 2022-10-05 ENCOUNTER — APPOINTMENT (OUTPATIENT)
Dept: PHYSICAL THERAPY | Age: 67
End: 2022-10-05
Payer: MEDICARE

## 2022-10-10 ENCOUNTER — HOSPITAL ENCOUNTER (OUTPATIENT)
Dept: PHYSICAL THERAPY | Age: 67
Discharge: HOME | End: 2022-10-10
Payer: MEDICARE

## 2022-10-10 PROCEDURE — 97112 NEUROMUSCULAR REEDUCATION: CPT

## 2022-10-10 PROCEDURE — 97140 MANUAL THERAPY 1/> REGIONS: CPT

## 2022-10-10 PROCEDURE — 97110 THERAPEUTIC EXERCISES: CPT

## 2022-10-10 NOTE — PROGRESS NOTES
PT DAILY TREATMENT NOTE     Patient Name: Chetna Montgomery  Date:10/10/2022  : 1955  [x]  Patient  Verified  Payor: Kacey Willis / Plan: Thor Diego / Product Type: Managed Care Medicare /    In time:3:00  Out time:3:41  Total Treatment Time (min): 41  Visit #: 1 of     Medicare/BCBS Only   Total Timed Codes (min):  41 1:1 Treatment Time:  41       Treatment Area: Other low back pain [M54.59]  Lumbar back pain [M54.50]    SUBJECTIVE  Pain Level (0-10 scale): 0  Any medication changes, allergies to medications, adverse drug reactions, diagnosis change, or new procedure performed?: [x] No    [] Yes (see summary sheet for update)  Subjective functional status/changes:   [] No changes reported  The pt reports she went for a hike that was 3.8 miles with some stops throughout but she was not overly sore after     OBJECTIVE    10 min Therapeutic Exercise:  [] See flow sheet :   Rationale: increase ROM and increase strength to improve the patients ability to perform daily tasks     8 min Therapeutic Activity:  []  See flow sheet : squat and carry practice/mechanics work   Rationale: increase strength and improve coordination  to improve the patients ability to perform functional tasks with improved efficiency     15 min Neuromuscular Re-education:  []  See flow sheet :   Rationale: increase ROM, increase strength, and increase proprioception  to improve the patients ability to perform ADLs with improved lumbopelvic mechanics    8 min Manual Therapy:  right LE traction x 2, STM right proximal glutes, B SIJ PAs grade II-III   The manual therapy interventions were performed at a separate and distinct time from the therapeutic activities interventions.   Rationale: increase tissue extensibility and decrease trigger points to improve glute contractility and ease of core stabilization          With   [] TE   [] TA   [] neuro   [] other: Patient Education: [x] Review HEP    [] Progressed/Changed HEP based on:   [] positioning   [] body mechanics   [] transfers   [] heat/ice application    [] other:      Other Objective/Functional Measures:      Pain Level (0-10 scale) post treatment: 0    ASSESSMENT/Changes in Function: The pt demonstrates good response to progressed strength interventions today. She does report some right hip tightness but not limiting to activity. Discussed with pt she is able to initiate some gym activity as desired as she reports waiting until it was safe. Continue with core stability work and functional lifting mechanics    Patient will continue to benefit from skilled PT services to modify and progress therapeutic interventions, address functional mobility deficits, address ROM deficits, address strength deficits, analyze and address soft tissue restrictions, analyze and cue movement patterns, and analyze and modify body mechanics/ergonomics to attain remaining goals. []  See Plan of Care  []  See progress note/recertification  []  See Discharge Summary         Progress towards goals / Updated goals:  Goals for this certification period to be accomplished in 3-4 weeks:  1. Pt to report ability to walk 3 miles without increase in back pain to improve exercise participation. PN: 2-2.5 miles  2. Pt to report at least 75% improvement in overall symptoms to perform ease of performing household chores and activities. PN: pt reports 30-40% improvement   3. Pt to report FOTO score of at least 56 pts to demonstrate improved function and quality of life. PN: increased to 50   4. Patient will be able to lift and carry 15-20# with proper body mechanics to carry groceries.   PN: initiated 15# squat lift, no carries   Current: initiated carrying today with 15# some mechanics cuing required to reduce stress on lumbar spine 10/10/2022    PLAN  [x]  Upgrade activities as tolerated     []  Continue plan of care  []  Update interventions per flow sheet       [] Discharge due to:_  []  Other:_      Luz Barone DPT CMTPT 10/10/2022  3:03 PM    Future Appointments   Date Time Provider Paulette Tello   10/13/2022 10:30 AM Stewart Iglesias [address] [facility]   10/17/2022  1:30 PM Ruth Johnson [facility]   10/19/2022  1:00 PM Torito Love, PT [facility]   10/24/2022 11:15 AM Ruth Johnson [facility]

## 2022-10-13 ENCOUNTER — HOSPITAL ENCOUNTER (OUTPATIENT)
Dept: PHYSICAL THERAPY | Age: 67
Discharge: HOME | End: 2022-10-13
Payer: MEDICARE

## 2022-10-13 PROCEDURE — 97112 NEUROMUSCULAR REEDUCATION: CPT

## 2022-10-13 PROCEDURE — 97140 MANUAL THERAPY 1/> REGIONS: CPT

## 2022-10-13 PROCEDURE — 97110 THERAPEUTIC EXERCISES: CPT

## 2022-10-13 PROCEDURE — 97530 THERAPEUTIC ACTIVITIES: CPT

## 2022-10-13 NOTE — PROGRESS NOTES
PT DAILY TREATMENT NOTE     Patient Name: Elly Lynn  Date:10/13/2022  : 1955  [x]  Patient  Verified  Payor: Christiano Ray / Plan: Lorice Dakins / Product Type: Managed Care Medicare /    In time:1100  Out time:1157  Total Treatment Time (min): 57  Visit #: 2 of -    Medicare/BCBS Only   Total Timed Codes (min):  57 1:1 Treatment Time:  57       Treatment Area: Other low back pain [M54.59]  Lumbar back pain [M54.50]    SUBJECTIVE  Pain Level (0-10 scale): 1 \"stiff\"   Any medication changes, allergies to medications, adverse drug reactions, diagnosis change, or new procedure performed?: [x] No    [] Yes (see summary sheet for update)  Subjective functional status/changes:   [] No changes reported  Patient reports that she feels a little stiff today, likely because she did not do her stretches yesterday and \"the weather\". She hasn't made it into the gym yet but plans to next week. OBJECTIVE    20 min Therapeutic Exercise:  [x] See flow sheet :   Rationale: increase ROM and increase strength to improve the patients ability to complete ADLs with ease. 8 min Therapeutic Activity:  [x]  See flow sheet : functional squatting and carrying   Rationale: increase ROM, increase strength, improve coordination, and increase proprioception  to improve the patients ability to complete functional activities with ease. 20 min Neuromuscular Re-education:  [x]  See flow sheet : hip, glute, and core re-education, including Vc/Tc to increase core engagement to reduce lumbar hyperextension   Rationale: increase ROM, increase strength, improve coordination, improve balance, and increase proprioception  to improve the patients ability to improve biomechanics and increase kinesthetic awareness for ease of ADL completion. 9 min Manual Therapy:  STM to B superior glutes, Grade 3-4 SIJ PA mobs with patient supine. LE traction x 2 with patient supine.     The manual therapy interventions were performed at a separate and distinct time from the therapeutic activities interventions. Rationale: decrease pain, increase ROM, increase tissue extensibility, and decrease trigger points to improve functional mobility. With   [x] TE   [] TA   [] neuro   [] other: Patient Education: [x] Review HEP    [] Progressed/Changed HEP based on:   [] positioning   [] body mechanics   [] transfers   [] heat/ice application    [] other:      Other Objective/Functional Measures:   -added time under tension during hip extension and abduction on core align.      -increased reps during KB squats    -cavitation noted during PA SIJ mobs. Pain Level (0-10 scale) post treatment: 0    ASSESSMENT/Changes in Function: Today's session focused on increased hip, glute, and core strength, mobility, and kinesthetic awareness. Patient tolerated treatment well, reporting no adverse responses throughout activities. Verbal and tactile cueing continued to be required to decrease lumbar hyperextension with dynamic movement. Patient will continue to benefit from skilled PT services to modify and progress therapeutic interventions, address functional mobility deficits, address ROM deficits, address strength deficits, analyze and address soft tissue restrictions, analyze and cue movement patterns, analyze and modify body mechanics/ergonomics, assess and modify postural abnormalities, address imbalance/dizziness, and instruct in home and community integration to attain remaining goals. []  See Plan of Care  [x]  See progress note/recertification  []  See Discharge Summary         Progress towards goals / Updated goals:  Goals for this certification period to be accomplished in 3-4 weeks:  1. Pt to report ability to walk 3 miles without increase in back pain to improve exercise participation. PN: 2-2.5 miles  2.  Pt to report at least 75% improvement in overall symptoms to perform ease of performing household chores and activities. PN: pt reports 30-40% improvement   3. Pt to report FOTO score of at least 56 pts to demonstrate improved function and quality of life. PN: increased to 50   4. Patient will be able to lift and carry 15-20# with proper body mechanics to carry groceries.   PN: initiated 15# squat lift, no carries   Current: initiated carrying today with 15# some mechanics cuing required to reduce stress on lumbar spine 10/10/2022    PLAN  [x]  Upgrade activities as tolerated     []  Continue plan of care  []  Update interventions per flow sheet       []  Discharge due to:_  []  Other:_      Julien Guerrero PTA 10/13/2022  7:56 AM    Future Appointments   Date Time Provider Paulette Tello   10/13/2022 11:00 AM Nguyen Perez, PTA [facility]   10/17/2022  1:30 PM Delcie Prom [facility]   10/19/2022  1:00 PM Александр Weaver, PT [facility]   10/24/2022 11:15 AM Delcie Prom [facility]

## 2022-10-17 ENCOUNTER — HOSPITAL ENCOUNTER (OUTPATIENT)
Dept: PHYSICAL THERAPY | Age: 67
Discharge: HOME | End: 2022-10-17
Payer: MEDICARE

## 2022-10-17 PROCEDURE — 97140 MANUAL THERAPY 1/> REGIONS: CPT

## 2022-10-17 PROCEDURE — 97112 NEUROMUSCULAR REEDUCATION: CPT

## 2022-10-17 PROCEDURE — 97110 THERAPEUTIC EXERCISES: CPT

## 2022-10-17 NOTE — PROGRESS NOTES
PT DAILY TREATMENT NOTE     Patient Name: Lionel Steele  Date:10/17/2022  : 1955  [x]  Patient  Verified  Payor: Georgiana Shahid / Plan: Susanne Rodriguez / Product Type: Managed Care Medicare /    In time:1:29  Out time:2:15  Total Treatment Time (min): 46  Visit #: 3 of 6-8    Medicare/BCBS Only   Total Timed Codes (min):  46 1:1 Treatment Time:  46       Treatment Area: Other low back pain [M54.59]  Lumbar back pain [M54.50]    SUBJECTIVE  Pain Level (0-10 scale): 0  Any medication changes, allergies to medications, adverse drug reactions, diagnosis change, or new procedure performed?: [x] No    [] Yes (see summary sheet for update)  Subjective functional status/changes:   [] No changes reported  The pt reports doing well overall.  Hasn't attempted workout yet    OBJECTIVE    Modality rationale: PD  to improve the patients ability to    Min Type Additional Details    [] Estim:  []Unatt       []IFC  []Premod                        []Other:  []w/ice   []w/heat  Position:  Location:    [] Estim: []Att    []TENS instruct  []NMES                    []Other:  []w/US   []w/ice   []w/heat  Position:  Location:    []  Traction: [] Cervical       []Lumbar                       [] Prone          []Supine                       []Intermittent   []Continuous Lbs:  [] before manual  [] after manual    []  Ultrasound: []Continuous   [] Pulsed                           []1MHz   []3MHz W/cm2:  Location:    []  Iontophoresis with dexamethasone         Location: [] Take home patch   [] In clinic    []  Ice     []  heat  []  Ice massage  []  Laser   []  Anodyne Position:  Location:    []  Laser with stim  []  Other:  Position:  Location:    []  Vasopneumatic Device    []  Right     []  Left  Pre-treatment girth:  Post-treatment girth:  Measured at (location):  Pressure:       [] lo [] med [] hi   Temperature: [] lo [] med [] hi   [] Skin assessment post-treatment:  []intact []redness- no adverse postmenopausal vaginal bleeding postmenopausal vaginal bleeding postmenopausal vaginal bleeding postmenopausal vaginal bleeding postmenopausal vaginal bleeding postmenopausal vaginal bleeding postmenopausal vaginal bleeding postmenopausal vaginal bleeding postmenopausal vaginal bleeding postmenopausal vaginal bleeding postmenopausal vaginal bleeding postmenopausal vaginal bleeding postmenopausal vaginal bleeding postmenopausal vaginal bleeding postmenopausal vaginal bleeding postmenopausal vaginal bleeding postmenopausal vaginal bleeding reaction    []redness - adverse reaction:     18 min Therapeutic Exercise:  [] See flow sheet :   Rationale: increase ROM and increase strength to improve the patients ability to perform daily tasks and self care    8 min Therapeutic Activity:  []  See flow sheet :   Rationale: increase strength and improve coordination  to improve the patients ability to perform functional tasks and lifting activities with improved mechanics      12 min Neuromuscular Re-education:  []  See flow sheet :   Rationale: increase strength and increase proprioception  to improve the patients ability to perform recreational activities with improved core recruitment and stability    8 min Manual Therapy:  STM/MFR right lumbar paraspinals and right SIJ PAs grade III in prone   The manual therapy interventions were performed at a separate and distinct time from the therapeutic activities interventions. Rationale: increase ROM and increase tissue extensibility to improve ease of ADL performance            With   [] TE   [] TA   [] neuro   [] other: Patient Education: [x] Review HEP    [] Progressed/Changed HEP based on:   [] positioning   [] body mechanics   [] transfers   [] heat/ice application    [] other:      Other Objective/Functional Measures: pt still requires some cuing for proper lifting form with box lifts to improve stance width and reduce lumbar flexion     Pain Level (0-10 scale) post treatment: 0    ASSESSMENT/Changes in Function: The pt demonstrates good response to exercise today, some right lateral hip fatigue (\"fired up\") near end of session but not reported as pain. Continue with strength work through Omaze and core.  Advised pt she is good to attempt workouts to just reduce intensity and volume initially    Patient will continue to benefit from skilled PT services to modify and progress therapeutic interventions, address functional mobility deficits, address ROM deficits, address strength deficits, analyze and address postmenopausal vaginal bleeding postmenopausal vaginal bleeding soft tissue restrictions, analyze and cue movement patterns, and analyze and modify body mechanics/ergonomics to attain remaining goals. []  See Plan of Care  []  See progress note/recertification  []  See Discharge Summary         Progress towards goals / Updated goals:  Goals for this certification period to be accomplished in 3-4 weeks:  1. Pt to report ability to walk 3 miles without increase in back pain to improve exercise participation. PN: 2-2.5 miles   Current: Met pt reports ability to ambulate 3 miles without issues at this time 10/17/2022  2. Pt to report at least 75% improvement in overall symptoms to perform ease of performing household chores and activities. PN: pt reports 30-40% improvement   3. Pt to report FOTO score of at least 56 pts to demonstrate improved function and quality of life. PN: increased to 50   4. Patient will be able to lift and carry 15-20# with proper body mechanics to carry groceries.   PN: initiated 15# squat lift, no carries   Current: initiated carrying today with 15# some mechanics cuing required to reduce stress on lumbar spine 10/10/2022    PLAN  [x]  Upgrade activities as tolerated     []  Continue plan of care  []  Update interventions per flow sheet       []  Discharge due to:_  []  Other:_      Mireya Reardon DPT CMTPT 10/17/2022  1:32 PM    Future Appointments   Date Time Provider Paulette Tello   10/19/2022  1:00 PM Sue Yost PT [facility]   10/24/2022 11:15 AM Alondra Dorantes [facility] postmenopausal vaginal bleeding postmenopausal vaginal bleeding postmenopausal vaginal bleeding

## 2022-10-19 ENCOUNTER — HOSPITAL ENCOUNTER (OUTPATIENT)
Dept: PHYSICAL THERAPY | Age: 67
Discharge: HOME | End: 2022-10-19
Payer: MEDICARE

## 2022-10-19 PROCEDURE — 97530 THERAPEUTIC ACTIVITIES: CPT

## 2022-10-19 PROCEDURE — 97110 THERAPEUTIC EXERCISES: CPT

## 2022-10-19 PROCEDURE — 97112 NEUROMUSCULAR REEDUCATION: CPT

## 2022-10-24 ENCOUNTER — HOSPITAL ENCOUNTER (OUTPATIENT)
Dept: PHYSICAL THERAPY | Age: 67
Discharge: HOME | End: 2022-10-24
Payer: MEDICARE

## 2022-10-24 PROCEDURE — 97530 THERAPEUTIC ACTIVITIES: CPT

## 2022-10-24 PROCEDURE — 97110 THERAPEUTIC EXERCISES: CPT

## 2022-10-24 PROCEDURE — 97112 NEUROMUSCULAR REEDUCATION: CPT

## 2022-10-24 NOTE — PROGRESS NOTES
PT DAILY TREATMENT NOTE     Patient Name: Diamante Ackerman  Date:10/24/2022  : 1955  [x]  Patient  Verified  Payor: Ayana Carballo / Plan: Yosef Wolff / Product Type: Managed Care Medicare /    In time:11:16  Out time:12:09  Total Treatment Time (min): 53  Visit #: 5 of 6-8    Medicare/BCBS Only   Total Timed Codes (min):  43 1:1 Treatment Time:  43       Treatment Area:  Other low back pain [M54.59]  Lumbar back pain [M54.50]    SUBJECTIVE  Pain Level (0-10 scale): 1  Any medication changes, allergies to medications, adverse drug reactions, diagnosis change, or new procedure performed?: [x] No    [] Yes (see summary sheet for update)  Subjective functional status/changes:   [] No changes reported  \"Did a bunch of stuff so its a little tight but it was fun\"    OBJECTIVE    Modality rationale: decrease pain and increase tissue extensibility to improve the patients ability to perform daily tasks    Min Type Additional Details    [] Estim:  []Unatt       []IFC  []Premod                        []Other:  []w/ice   []w/heat  Position:  Location:    [] Estim: []Att    []TENS instruct  []NMES                    []Other:  []w/US   []w/ice   []w/heat  Position:  Location:    []  Traction: [] Cervical       []Lumbar                       [] Prone          []Supine                       []Intermittent   []Continuous Lbs:  [] before manual  [] after manual    []  Ultrasound: []Continuous   [] Pulsed                           []1MHz   []3MHz W/cm2:  Location:    []  Iontophoresis with dexamethasone         Location: [] Take home patch   [] In clinic   10 []  Ice     [x]  heat  []  Ice massage  []  Laser   []  Anodyne Position: seated  Location: right hip/SIJ    []  Laser with stim  []  Other:  Position:  Location:    []  Vasopneumatic Device    []  Right     []  Left  Pre-treatment girth:  Post-treatment girth:  Measured at (location):  Pressure:       [] lo [] med [] hi   Temperature: [] lo [] med [] hi   [] Skin assessment post-treatment:  []intact []redness- no adverse reaction    []redness - adverse reaction:     17 min Therapeutic Exercise:  [] See flow sheet :   Rationale: increase ROM and increase strength to improve the patients ability to perform ADLs    8 min Therapeutic Activity:  []  See flow sheet : lifting/carrying acts   Rationale: increase strength and improve coordination  to improve the patients ability to perform lifting and household tasks with proper mechanics     10 min Neuromuscular Re-education:  []  See flow sheet :   Rationale: increase strength and increase proprioception  to improve the patients ability to perform functional tasks with improved mechanics and stability    8 min Manual Therapy:  MFR/STM right lumbar paraspinals in prone   The manual therapy interventions were performed at a separate and distinct time from the therapeutic activities interventions. Rationale: increase ROM and increase tissue extensibility to improve ease of ADL performance           With   [] TE   [] TA   [] neuro   [] other: Patient Education: [x] Review HEP    [] Progressed/Changed HEP based on:   [] positioning   [] body mechanics   [] transfers   [] heat/ice application    [] other:      Other Objective/Functional Measures:  FOTO 83 indicating improved functional capacity     Pain Level (0-10 scale) post treatment: 0    ASSESSMENT/Changes in Function: The pt has made good progress regarding pain and mechanics work. She does report some tightness in her right SIJ/lumbar region after activity but not painful limiting ADLs. She has been educated on gradual return/progression of workouts. She feels that she is ready to D/C today and trial self management program. Objective findings support this.      Patient will continue to benefit from skilled PT services to modify and progress therapeutic interventions, address functional mobility deficits, address ROM deficits, address strength deficits, analyze and address soft tissue restrictions, analyze and cue movement patterns, and analyze and modify body mechanics/ergonomics to attain remaining goals. []  See Plan of Care  []  See progress note/recertification  [x]  See Discharge Summary         Progress towards goals / Updated goals:  Goals for this certification period to be accomplished in 3-4 weeks:  1. Pt to report ability to walk 3 miles without increase in back pain to improve exercise participation. PN: 2-2.5 miles              Current: Met pt reports ability to ambulate 3 miles without issues at this time 10/17/2022  2. Pt to report at least 75% improvement in overall symptoms to perform ease of performing household chores and activities. PN: pt reports 30-40% improvement   Current: Met 85% improvement 10/19/2022  3. Pt to report FOTO score of at least 56 pts to demonstrate improved function and quality of life. PN: increased to 50   4. Patient will be able to lift and carry 15-20# with proper body mechanics to carry groceries. PN: initiated 15# squat lift, no carries   Current: Met - 18#  good form 10/19/2022    PLAN  []  Upgrade activities as tolerated     []  Continue plan of care  []  Update interventions per flow sheet       [x]  Discharge due to: achievement of set goals  []  Other:_      Artem Done DPT CMTPT 10/24/2022  11:18 AM    No future appointments.

## 2022-10-24 NOTE — PROGRESS NOTES
[facility]  [address]  [place], [address]  [phone] fax    Physical Therapy Discharge Summary  Patient name: Arlin Lombardo Start of Care: 2022   Referral source: Ramonita Schofield MD : 1955               Medical Diagnosis: Other low back pain [M54.59]  Lumbar back pain [M54.50]  Payor: Pattiroselyn Packchavezrebecca / Plan: [address] [employer] / Product Type: Managed Care Medicare /  Onset Date:2022; ongoing               Treatment Diagnosis: Back pain   Prior Hospitalization: see medical history Provider#: [id number]   Medications: Verified on Patient summary List    Comorbidities: depression, osteoporosis, arthritis, thyroid problems, pamphigus vulgaris, high blood pressure, 2 left meniscus repairs   Prior Level of Function: independent with ADLs  Visits from Start of Care: 13    Missed Visits: 1  Reporting Period : 10/3/2022 to 10/24/2022    Summary of Care:  Goals for this certification period to be accomplished in 3-4 weeks:  1. Pt to report ability to walk 3 miles without increase in back pain to improve exercise participation. PN: 2-2.5 miles              Current: Met pt reports ability to ambulate 3 miles without issues at this time 10/17/2022  2. Pt to report at least 75% improvement in overall symptoms to perform ease of performing household chores and activities. PN: pt reports 30-40% improvement   Current: Met 85% improvement 10/19/2022  3. Pt to report FOTO score of at least 56 pts to demonstrate improved function and quality of life. PN: increased to 50   Current: met 83 10/24/2022  4. Patient will be able to lift and carry 15-20# with proper body mechanics to carry groceries. PN: initiated 15# squat lift, no carries   Current: Met - 18#  good form 10/19/2022    ASSESSMENT/RECOMMENDATIONS: The pt has made good progress regarding pain and mechanics work.  She does report some tightness in her right SIJ/lumbar region after activity but not painful limiting ADLs. She has been educated on gradual return/progression of workouts. She feels that she is ready to D/C today and trial self management program. Objective findings support this.      [x]Discontinue therapy: [x]Patient has reached or is progressing toward set goals      []Patient is non-compliant or has abdicated      []Due to lack of appreciable progress towards set 70 Antonio Gonzalez 10/24/2022 12:15 PM

## 2023-07-01 NOTE — PATIENT INSTRUCTIONS
You may now shower and get your incisions wet. We recommend starting scar massage in 1 week to your incision(s). Take Vitamin E, Cocoa Butter or Scar Cream and massage the incision 2 times a day. This will help soften your incisions and help de-sensitize the skin as the nerves \"wake up\".
6 (moderate pain)

## 2023-10-19 ENCOUNTER — HOSPITAL ENCOUNTER (OUTPATIENT)
Facility: HOSPITAL | Age: 68
Setting detail: RECURRING SERIES
Discharge: HOME | End: 2023-10-22
Payer: MEDICARE

## 2023-10-19 PROCEDURE — 97162 PT EVAL MOD COMPLEX 30 MIN: CPT

## 2023-10-19 PROCEDURE — 97110 THERAPEUTIC EXERCISES: CPT

## 2023-10-19 NOTE — PROGRESS NOTES
[address] [facility] [address] [facility] MO:[phone] Fx: [phone]    PLAN OF CARE/ Statement of Necessity for Physical Therapy Services           Patient name: Madhav Obregon Start of Care: 10/19/2023   Referral source: Kike Nielson : 1955    Medical Diagnosis: Neck pain [M54.2]       Onset Date: 2023   Treatment Diagnosis: M25.511  RIGHT SHOULDER PAIN                                     Prior Hospitalization: see medical history Provider#: [id number]   Medications: Verified on Patient Summary List     Comorbidities: Arthritis, degenerative arthritis of right knee, hypertension, thyroid disease and migraines. Prior Level of Function: Able to do lifting, pushing and pulling without any pain in the right shoulder    The Plan of Care and following information is based on the information from the initial evaluation. Assessment / key information:  Pt is a 79 y/o female came for the physical therapy evaluation with a CC of  pain in the right shoulder. Pt reported no referred pain in the arm, no numbness, tingling or weakness. Pt denied  systemic symptoms such as fevers, chills or weight loss. Also stated that MRI results were negative for fractures or dislocations. Pt stated that the pain had a gradual onset  5 years ago and denied any injury or falls. Pain has gotten worse since then. Pt had tenderness on the UT and supraspinatus. Some decreased AROM noted in the scaption, flexion and abduction. AROM is more painful then PROM. Decreased strength noted in flexion, abduction, ER/IR and scaption. Pt had positive empty can test and full can set and painful arc starting at 90 degrees. Pt's signs and symptoms are consistent with the supraspinatus involvement and impingement syndrome. Due to these impairments, pt is unable to perform  overhead activities and pushing and pulling tasks and is affecting her ADL's.  Pt will
functional tasks with right UE with ease. IE: AROM of right shoulder scaption 150 degrees  3. Patient will improve strength in the right shoulder musculature to 4+/5 in order to be able to perform pushing and pulling tasks with ease. IE: Right shoulder flexion, abduction, ER/IR strength 3+/5 grossly  4. Patient will report pain levels to go down to 0/10 as demonstrated by pt's ability to perform functional tasks at home with ease.   IE: Worse pain levels: 8/10    PLAN  Yes  Continue plan of care  []  Upgrade activities as tolerated  []  Discharge due to :  []  Other:    Shalom Ramirez PT    10/19/2023    8:25 AM    Future Appointments   Date Time Provider [address]   10/19/2023 11:50 AM Hillery Sawyers, PT Tomy Ganser

## 2023-11-02 ENCOUNTER — HOSPITAL ENCOUNTER (OUTPATIENT)
Facility: HOSPITAL | Age: 68
Setting detail: RECURRING SERIES
Discharge: HOME | End: 2023-11-05
Payer: MEDICARE

## 2023-11-02 PROCEDURE — 97140 MANUAL THERAPY 1/> REGIONS: CPT

## 2023-11-02 PROCEDURE — 97112 NEUROMUSCULAR REEDUCATION: CPT

## 2023-11-02 PROCEDURE — 97110 THERAPEUTIC EXERCISES: CPT

## 2023-11-02 PROCEDURE — 97530 THERAPEUTIC ACTIVITIES: CPT

## 2023-11-03 ENCOUNTER — HOSPITAL ENCOUNTER (OUTPATIENT)
Facility: HOSPITAL | Age: 68
Setting detail: RECURRING SERIES
Discharge: HOME | End: 2023-11-06
Payer: MEDICARE

## 2023-11-03 PROCEDURE — 97110 THERAPEUTIC EXERCISES: CPT

## 2023-11-03 PROCEDURE — 97140 MANUAL THERAPY 1/> REGIONS: CPT

## 2023-11-03 PROCEDURE — 97112 NEUROMUSCULAR REEDUCATION: CPT

## 2023-11-03 NOTE — PROGRESS NOTES
PHYSICAL / OCCUPATIONAL THERAPY - DAILY TREATMENT NOTE (updated )    Patient Name: Kristen Woods    Date: 11/3/2023    : 1955  Insurance: Payor: Hadley Castanon / Plan: Herman Johnson PPO / Product Type: Medicare /      Patient  verified Yes     Visit #   Current / Total 3 24   Time   In / Out 1:50 2:41   Pain   In / Out 1/10 010   Subjective Functional Status/Changes: The patient states she did well after PT last session and feels she has pain of her shoulder girdle on the right - upper trap. Changes to: Allergies, Med Hx, Sx Hx?   no       TREATMENT AREA =  Pain in right shoulder [M25.511]    OBJECTIVE    Modalities Rationale:     decrease pain and increase tissue extensibility to improve patient's ability to progress to PLOF and address remaining functional goals. 10   min  unbilled []  Ice     [x]  Heat    location/position:   Right UT/LS; pt seated   Skin assessment post-treatment (if applicable):    []  intact    []  redness- no adverse reaction                 []redness - adverse reaction:         Therapeutic Procedures: Tx Min Billable or 1:1 Min (if diff from Tx Min) Procedure, Rationale, Specifics   21  83189 Therapeutic Exercise (timed):  increase ROM, strength, coordination, balance, and proprioception to improve patient's ability to progress to PLOF and address remaining functional goals. (see flow sheet as applicable)    Details if applicable:       12  65814 Neuromuscular Re-Education (timed):  improve balance, coordination, kinesthetic sense, posture, core stability and proprioception to improve patient's ability to develop conscious control of individual muscles and awareness of position of extremities in order to progress to PLOF and address remaining functional goals.  (see flow sheet as applicable)    Details if applicable:       8  87479 Manual Therapy (timed):  decrease pain, increase ROM, and increase tissue extensibility to improve patient's ability to

## 2023-11-07 ENCOUNTER — HOSPITAL ENCOUNTER (OUTPATIENT)
Facility: HOSPITAL | Age: 68
Setting detail: RECURRING SERIES
Discharge: HOME | End: 2023-11-10
Payer: MEDICARE

## 2023-11-07 PROCEDURE — 97112 NEUROMUSCULAR REEDUCATION: CPT

## 2023-11-07 PROCEDURE — 97530 THERAPEUTIC ACTIVITIES: CPT

## 2023-11-07 PROCEDURE — 97110 THERAPEUTIC EXERCISES: CPT

## 2023-11-07 NOTE — PROGRESS NOTES
PHYSICAL / OCCUPATIONAL THERAPY - DAILY TREATMENT NOTE (updated )    Patient Name: Jessica Ayers    Date: 2023    : 1955  Insurance: Payor: Frances Marques / Plan: [employer] PPO / Product Type: Medicare /      Patient  verified Yes     Visit #   Current / Total 4 24   Time   In / Out 10:32 11:24   Pain   In / Out 1 0   Subjective Functional Status/Changes: Pt reported feeling o.k no new changes reported   Changes to: Allergies, Med Hx, Sx Hx?   no       TREATMENT AREA =  Pain in right shoulder [M25.511]    OBJECTIVE    Modalities Rationale:     decrease pain and increase tissue extensibility to improve patient's ability to progress to PLOF and address remaining functional goals. min [] Estim Unattended, type/location:                                      []  w/ice    []  w/heat    min [] Estim Attended, type/location:                                     []  w/US     []  w/ice    []  w/heat    []  TENS insruct      min []  Mechanical Traction: type/lbs                   []  pro   []  sup   []  int   []  cont    []  before manual    []  after manual    min []  Ultrasound, settings/location:      min []  Iontophoresis w/ dexamethasone, location:                                               []  take home patch       []  in clinic     10   min  unbilled []  Ice     [x]  Heat    location/position: Right shoulder pt seated    min []  Paraffin,  details:     min []  Vasopneumatic Device, press/temp:     min []  Starleen Tri / Verlon Brightly: If using vaso (only need to measure limb vaso being performed on)      pre-treatment girth :       post-treatment girth :       measured at (landmark location) :      min []  Other:    Skin assessment post-treatment (if applicable):    []  intact    []  redness- no adverse reaction                 []redness - adverse reaction:         Therapeutic Procedures:   Tx Min Billable or 1:1 Min (if diff from Tx Min) Procedure, Rationale, Specifics   83 [id number]

## 2023-11-09 ENCOUNTER — HOSPITAL ENCOUNTER (OUTPATIENT)
Facility: HOSPITAL | Age: 68
Setting detail: RECURRING SERIES
Discharge: HOME | End: 2023-11-12
Payer: MEDICARE

## 2023-11-09 PROCEDURE — 97110 THERAPEUTIC EXERCISES: CPT

## 2023-11-09 PROCEDURE — 97140 MANUAL THERAPY 1/> REGIONS: CPT

## 2023-11-09 PROCEDURE — 97530 THERAPEUTIC ACTIVITIES: CPT

## 2023-11-09 NOTE — PROGRESS NOTES
PHYSICAL / OCCUPATIONAL THERAPY - DAILY TREATMENT NOTE (updated )    Patient Name: Marilia Akins    Date: 2023    : 1955  Insurance: Payor: Nikkie Bray / Plan: [employer] PPO / Product Type: Medicare /      Patient  verified Yes     Visit #   Current / Total 5 24   Time   In / Out 10:32 11:27   Pain   In / Out 0 0   Subjective Functional Status/Changes: Pt reported feeling O.K, no new changes reported   Changes to: Allergies, Med Hx, Sx Hx?   no       TREATMENT AREA =  Pain in right shoulder [M25.511]    OBJECTIVE    Modalities Rationale:     decrease pain and increase tissue extensibility to improve patient's ability to progress to PLOF and address remaining functional goals. 10   min  unbilled []  Ice     [x]  Heat    location/position: Right shoulder in sitting    min []  Paraffin,  details:     min []  Vasopneumatic Device, press/temp:     min []  Ezzie Buzzard / Maryln Sea: If using vaso (only need to measure limb vaso being performed on)      pre-treatment girth :       post-treatment girth :       measured at (landmark location) :      min []  Other:    Skin assessment post-treatment (if applicable):    []  intact    []  redness- no adverse reaction                 []redness - adverse reaction:         Therapeutic Procedures: Tx Min Billable or 1:1 Min (if diff from Tx Min) Procedure, Rationale, Specifics   20  68127 Therapeutic Exercise (timed):  increase ROM, strength, coordination, balance, and proprioception to improve patient's ability to progress to PLOF and address remaining functional goals. (see flow sheet as applicable)    Details if applicable:       17  30209 Therapeutic Activity (timed):  use of dynamic activities replicating functional movements to increase ROM, strength, coordination, balance, and proprioception in order to improve patient's ability to progress to PLOF and address remaining functional goals.   (see flow sheet as applicable)    Details

## 2023-11-13 ENCOUNTER — HOSPITAL ENCOUNTER (OUTPATIENT)
Facility: HOSPITAL | Age: 68
Setting detail: RECURRING SERIES
Discharge: HOME | End: 2023-11-16
Payer: MEDICARE

## 2023-11-13 PROCEDURE — 97112 NEUROMUSCULAR REEDUCATION: CPT

## 2023-11-13 PROCEDURE — 97140 MANUAL THERAPY 1/> REGIONS: CPT

## 2023-11-13 PROCEDURE — 97110 THERAPEUTIC EXERCISES: CPT

## 2023-11-13 NOTE — PROGRESS NOTES
PHYSICAL / OCCUPATIONAL THERAPY - DAILY TREATMENT NOTE (updated )    Patient Name: Bernabe Feliz    Date: 2023    : 1955  Insurance: Payor: Axel Zapata / Plan: Karen Roach PPO / Product Type: Medicare /      Patient  verified Yes     Visit #   Current / Total 6 24   Time   In / Out 11:48 12:37   Pain   In / Out 0/10 0/10   Subjective Functional Status/Changes: The patient reports she is doing well. Denies increase in pain upon arrival today. Changes to: Allergies, Med Hx, Sx Hx?   no       TREATMENT AREA =  Pain in right shoulder [M25.511]    OBJECTIVE  Modalities Rationale:     decrease pain and increase tissue extensibility to improve patient's ability to progress to PLOF and address remaining functional goals. 10   min  unbilled []  Ice     [x]  Heat    location/position:  Right upper trap, pt seated. Skin assessment post-treatment (if applicable):    []  intact    []  redness- no adverse reaction                 []redness - adverse reaction:         Therapeutic Procedures: Tx Min Billable or 1:1 Min (if diff from Tx Min) Procedure, Rationale, Specifics   19  85830 Therapeutic Exercise (timed):  increase ROM, strength, coordination, balance, and proprioception to improve patient's ability to progress to PLOF and address remaining functional goals. (see flow sheet as applicable)    Details if applicable:       12  31132 Neuromuscular Re-Education (timed):  improve balance, coordination, kinesthetic sense, posture, core stability and proprioception to improve patient's ability to develop conscious control of individual muscles and awareness of position of extremities in order to progress to PLOF and address remaining functional goals.  (see flow sheet as applicable)    Details if applicable:     8  14625 Manual Therapy (timed):  decrease pain, increase ROM, and increase tissue extensibility to improve patient's ability to progress to PLOF and address remaining

## 2023-11-15 ENCOUNTER — HOSPITAL ENCOUNTER (OUTPATIENT)
Facility: HOSPITAL | Age: 68
Setting detail: RECURRING SERIES
Discharge: HOME | End: 2023-11-18
Payer: MEDICARE

## 2023-11-15 PROCEDURE — 97112 NEUROMUSCULAR REEDUCATION: CPT

## 2023-11-15 PROCEDURE — 97110 THERAPEUTIC EXERCISES: CPT

## 2023-11-15 PROCEDURE — 97530 THERAPEUTIC ACTIVITIES: CPT

## 2023-11-15 NOTE — PROGRESS NOTES
[facility]  [address] [facility] [address] [facility] - Ph: [phone]   Fx: [phone]    PHYSICAL THERAPY PROGRESS NOTE      Patient name: Rory Moser Start of Care: 10/19/2023   Referral source: Per Shopistanal : 1955    Medical Diagnosis: Pain in right shoulder [M25.511]  Payor: Randy Estrella / Plan: Sharath Mari PPO / Product Type: Medicare /  Onset Date:2023    Treatment Diagnosis:  M25.511  RIGHT SHOULDER PAIN      Prior Hospitalization: see medical history Provider#: [id number]   Medications: Verified on Patient summary List   Comorbidities: Arthritis, degenerative arthritis of right knee, hypertension, thyroid disease and migraines  Prior Level of Function:Able to do lifting, pushing and pulling without any pain in the right shoulder    Visits from Start of Care: 7    Missed Visits: 0    Goals/Measure of Progress: To be achieved in 8 weeks:    Short Term Goals:   Patient will be independent and compliant with the HEP to maximize the therapeutic benefit of the exercises. IE: HEP reviewed and demonstrated  PN: Met  2. Patient will have improved AROM of shoulder flexion and abduction to 170 degrees in order to be able to perform overhead activities with ease. IE: AROM right shoulder flexion 145 degrees, abduction 155 degrees   PN: Met  Long Term Goals:   Patient will improve FOTO score to 66 to demonstrate functional improvements. IE: FOTO score: 64  PN: Regressed, 56  2. Patient will improve AROM of shoulder Scaption to 170 degrees in order to be able to perform functional tasks with right UE with ease. IE: AROM of right shoulder scaption 150 degrees  PN: Met, full AROM of right shoulder scaption  3. Patient will improve strength in the right shoulder musculature to 4+/5 in order to be able to perform pushing and pulling tasks with ease.   IE: Right shoulder flexion, abduction, ER/IR strength 3+/5
order to be able to perform overhead activities with ease. IE: AROM right shoulder flexion 145 degrees, abduction 155 degrees  Current:  Met full motion 11/13/2023     Long Term Goals: To be accomplished in 12 weeks  Patient will improve FOTO score to 66 to demonstrate functional improvements. IE: FOTO score: 64  2. Patient will improve AROM of shoulder Scaption to 170 degrees in order to be able to perform functional tasks with right UE with ease. IE: AROM of right shoulder scaption 150 degrees  3. Patient will improve strength in the right shoulder musculature to 4+/5 in order to be able to perform pushing and pulling tasks with ease. IE: Right shoulder flexion, abduction, ER/IR strength 3+/5 grossly  Current: Still the same,3+/5 grossly,   11/09/2023  4. Patient will report pain levels to go down to 0/10 as demonstrated by pt's ability to perform functional tasks at home with ease.   IE: Worse pain levels: 8/10  Current: progressing, patient reports 2/10 max pain over last week 11/2/23    PLAN  Yes  Continue plan of care  []  Upgrade activities as tolerated  []  Discharge due to :  []  Other:    Kendra Hathaway PT    11/15/2023    10:35 AM    Future Appointments   Date Time Provider [address]   11/30/2023 11:50 AM EMPERATRIZ Leo   12/4/2023 10:30 AM [facility] [place]   12/6/2023 10:30 AM EMPERATRIZ Leo   12/11/2023 10:30 AM [facility] [place]   12/13/2023 10:30 AM [facility] [place]   12/18/2023 10:30 AM Nola De La Garza, NE[facility] [place]

## 2023-11-27 ENCOUNTER — APPOINTMENT (OUTPATIENT)
Facility: HOSPITAL | Age: 68
End: 2023-11-27
Payer: MEDICARE

## 2023-11-29 ENCOUNTER — APPOINTMENT (OUTPATIENT)
Facility: HOSPITAL | Age: 68
End: 2023-11-29
Payer: MEDICARE

## 2023-11-30 ENCOUNTER — HOSPITAL ENCOUNTER (OUTPATIENT)
Facility: HOSPITAL | Age: 68
Setting detail: RECURRING SERIES
End: 2023-11-30
Payer: MEDICARE

## 2023-11-30 PROCEDURE — 97530 THERAPEUTIC ACTIVITIES: CPT

## 2023-11-30 PROCEDURE — 97110 THERAPEUTIC EXERCISES: CPT

## 2023-11-30 PROCEDURE — 97112 NEUROMUSCULAR REEDUCATION: CPT

## 2023-11-30 NOTE — PROGRESS NOTES
PHYSICAL / OCCUPATIONAL THERAPY - DAILY TREATMENT NOTE (updated )    Patient Name: Tyshawn Doshi    Date: 2023    : 1955  Insurance: Payor: Annette Hopkins / Plan: Comfort Mcelroy PPO / Product Type: Medicare /      Patient  verified Yes     Visit #   Current / Total 8 24   Time   In / Out 11:50 12:40   Pain   In / Out 2 0   Subjective Functional Status/Changes: Pt reported the shoulder being sore today post vacation   Changes to: Allergies, Med Hx, Sx Hx?   no       TREATMENT AREA =  Pain in right shoulder [M25.511]    OBJECTIVE    Modalities Rationale:     decrease pain and increase tissue extensibility to improve patient's ability to progress to PLOF and address remaining functional goals. 10 min  unbilled []  Ice     [x]  Heat    location/position: Right shoulder/sitting    min []  Paraffin,  details:     min []  Other:    Skin assessment post-treatment (if applicable):    []  intact    []  redness- no adverse reaction                 []redness - adverse reaction:         Therapeutic Procedures: Tx Min Billable or 1:1 Min (if diff from Tx Min) Procedure, Rationale, Specifics   20  01855 Therapeutic Exercise (timed):  increase ROM, strength, coordination, balance, and proprioception to improve patient's ability to progress to PLOF and address remaining functional goals. (see flow sheet as applicable)    Details if applicable:       10  01843 Therapeutic Activity (timed):  use of dynamic activities replicating functional movements to increase ROM, strength, coordination, balance, and proprioception in order to improve patient's ability to progress to PLOF and address remaining functional goals.   (see flow sheet as applicable)    Details if applicable:     10  63664 Neuromuscular Re-Education (timed):  improve balance, coordination, kinesthetic sense, posture, core stability and proprioception to improve patient's ability to develop conscious control of individual muscles

## 2023-12-04 ENCOUNTER — APPOINTMENT (OUTPATIENT)
Facility: HOSPITAL | Age: 68
End: 2023-12-04
Payer: MEDICARE

## 2023-12-06 ENCOUNTER — APPOINTMENT (OUTPATIENT)
Facility: HOSPITAL | Age: 68
End: 2023-12-06
Payer: MEDICARE

## 2023-12-11 ENCOUNTER — HOSPITAL ENCOUNTER (OUTPATIENT)
Facility: HOSPITAL | Age: 68
Setting detail: RECURRING SERIES
Discharge: HOME | End: 2023-12-14
Payer: MEDICARE

## 2023-12-11 PROCEDURE — 97112 NEUROMUSCULAR REEDUCATION: CPT

## 2023-12-11 PROCEDURE — 97110 THERAPEUTIC EXERCISES: CPT

## 2023-12-11 PROCEDURE — 97140 MANUAL THERAPY 1/> REGIONS: CPT

## 2023-12-11 NOTE — PROGRESS NOTES
to 170 degrees in order to be able to perform overhead activities with ease. IE: AROM right shoulder flexion 145 degrees, abduction 155 degrees  Current:  Met full motion 11/13/2023    Long Term Goals: To be accomplished in 12 weeks  Patient will improve FOTO score to 66 to demonstrate functional improvements. IE: FOTO score: 64  Current:   2. Patient will improve AROM of shoulder Scaption to 170 degrees in order to be able to perform functional tasks with right UE with ease. IE: AROM of right shoulder scaption 150 degrees  Current: Scaption:   3. Patient will improve strength in the right shoulder musculature to 4+/5 in order to be able to perform pushing and pulling tasks with ease. IE: Right shoulder flexion, abduction, ER/IR strength 3+/5 grossly  Current: Still the same,3+/5 grossly,   11/09/2023  4. Patient will report pain levels to go down to 0/10 as demonstrated by pt's ability to perform functional tasks at home with ease.   IE: Worse pain levels: 8/10  Current: progressing, patient reports 2/10 max pain over last week 11/2/23     PLAN  Yes  Continue plan of care  []  Upgrade activities as tolerated  []  Discharge due to :  []  Other:    Julito Bullock PT    12/11/2023    10:38 AM    Future Appointments   Date Time Provider [address] [place] [address]   12/13/2023 10:30 AM EMPERATRIZ Parker   12/18/2023 10:30 AM Jayson Agudelo PTA [facility]

## 2023-12-13 ENCOUNTER — HOSPITAL ENCOUNTER (OUTPATIENT)
Facility: HOSPITAL | Age: 68
Setting detail: RECURRING SERIES
Discharge: HOME | End: 2023-12-16
Payer: MEDICARE

## 2023-12-13 PROCEDURE — 97140 MANUAL THERAPY 1/> REGIONS: CPT

## 2023-12-13 PROCEDURE — 97112 NEUROMUSCULAR REEDUCATION: CPT

## 2023-12-13 PROCEDURE — 97110 THERAPEUTIC EXERCISES: CPT

## 2023-12-13 NOTE — PROGRESS NOTES
[address] [facility] PHYSICAL THERAPY  [address] [facility] - Ph: [phone]   Fx: [phone]    PHYSICAL THERAPY PROGRESS NOTE      Patient name: Bernabe Feliz Start of Care: 10/19/2023   Referral source: Lauryn Hinojosa : 1955               Medical Diagnosis: Pain in right shoulder [M25.511]  Payor: Axel Zapata / Plan: Karen Roach PPO / Product Type: Medicare /  Onset Date:2023               Treatment Diagnosis:  M25.511  RIGHT SHOULDER PAIN      Prior Hospitalization: see medical history Provider#: [id number]   Medications: Verified on Patient summary List   Comorbidities: Arthritis, degenerative arthritis of right knee, hypertension, thyroid disease and migraines  Prior Level of Function:Able to do lifting, pushing and pulling without any pain in the right shoulder    Visits from Start of Care: 10    Missed Visits: 0    Goals/Measure of Progress: To be achieved in 4 weeks:    Short Term Goals: To be accomplished in 3 weeks  Patient will be independent and compliant with the HEP to maximize the therapeutic benefit of the exercises. IE: HEP reviewed and demonstrated  Current: patient reports compliance. 23  2. Patient will have improved AROM of shoulder flexion and abduction to 170 degrees in order to be able to perform overhead activities with ease. IE: AROM right shoulder flexion 145 degrees, abduction 155 degrees  PN:  Met full motion 2023     Long Term Goals: To be accomplished in 12 weeks  Patient will improve FOTO score to 66 to demonstrate functional improvements. IE: FOTO score: 64  PN: 59  2. Patient will improve AROM of shoulder Scaption to 170 degrees in order to be able to perform functional tasks with right UE with ease. IE: AROM of right shoulder scaption 150 degrees  PN: Scaption: 165 degrees  3.  Patient will improve strength in the right shoulder musculature to 4+/5 in order to be able
activities as tolerated  []  Discharge due to :  []  Other:    Zelalem Adams, PT    12/13/2023    10:51 AM    Future Appointments   Date Time Provider [address]   12/18/2023 10:30 AM NEEL Resendiz

## 2024-01-26 ENCOUNTER — TRANSCRIBE ORDERS (OUTPATIENT)
Facility: HOSPITAL | Age: 69
End: 2024-01-26

## 2024-01-26 DIAGNOSIS — Z12.31 VISIT FOR SCREENING MAMMOGRAM: Primary | ICD-10-CM

## 2024-02-09 ENCOUNTER — HOSPITAL ENCOUNTER (OUTPATIENT)
Facility: HOSPITAL | Age: 69
End: 2024-02-09
Payer: MEDICARE

## 2024-02-09 VITALS — WEIGHT: 118 LBS | HEIGHT: 62 IN | BODY MASS INDEX: 21.71 KG/M2

## 2024-02-09 DIAGNOSIS — Z12.31 VISIT FOR SCREENING MAMMOGRAM: ICD-10-CM

## 2024-02-09 PROCEDURE — 77063 BREAST TOMOSYNTHESIS BI: CPT

## 2025-03-18 ENCOUNTER — HOSPITAL ENCOUNTER (OUTPATIENT)
Facility: HOSPITAL | Age: 70
Setting detail: RECURRING SERIES
Discharge: HOME | End: 2025-03-21
Payer: MEDICARE

## 2025-03-18 PROCEDURE — 97161 PT EVAL LOW COMPLEX 20 MIN: CPT

## 2025-03-18 PROCEDURE — 97110 THERAPEUTIC EXERCISES: CPT

## 2025-03-18 PROCEDURE — 97535 SELF CARE MNGMENT TRAINING: CPT

## 2025-03-18 NOTE — PROGRESS NOTES
[facility]  [address], [place]  [address]  Phone: [phone]      Fax:  [phone]    Plan of Care/ Statement of Necessity for Physical Therapy Services    Patient name: Cynthia Kumar Start of Care: 3/18/2025   Referral source: Vic Dykes MD : 1955    Medical Diagnosis: Sacrococcygeal disorders, not elsewhere classified [M53.3]       Onset Date:2024    Treatment Diagnosis:      M54.59  OTHER LOWER BACK PAIN                           Prior Hospitalization: see medical history Provider#: [id number]     Comorbidities: Depression, high blood pressure, osteoporosis, Thyroid problems, Pemphigus Vulgaris    Prior Level of Function:  Patient reports prior to 2024 was able to walk longer distances without as much discomfort.     The Plan of Care and following information is based on the information from the initial evaluation.    If an interpreting service is utilized for treatment of this patient, the contents of this document represent the material reviewed with the patient via the .     Assessment/ key information: Patient presents with low back/SI pain that began approximately 24 with an insidious onset. Patient describes LBP as intermittent throbbing/aching and located across low back. Patient reports intermittent radiating pain down right posterior thigh that stops at right knee. Patient denies numbness/tingling in (B) LE or below waist line, confirmed no weakness in (B) LE, and no dysfunction with bowel or bladder. Patient has increased difficulty with walking 30 minutes (pain level 5-6/10).  Patient exhibits decreased core stability, decreased (B) hip/glut strength, pain at end range of trunk mobility, decreased stability with SLS balance, and decreased technique and report of discomfort with squats. Patient demonstrates potential to make functional gains within a reasonable time frame. Patient would benefit 
treatment of this patient, the contents of this document represent the material reviewed with the patient via the .

## 2025-03-20 ENCOUNTER — HOSPITAL ENCOUNTER (OUTPATIENT)
Facility: HOSPITAL | Age: 70
Setting detail: RECURRING SERIES
Discharge: HOME | End: 2025-03-23
Payer: MEDICARE

## 2025-03-20 PROCEDURE — 97110 THERAPEUTIC EXERCISES: CPT

## 2025-03-20 PROCEDURE — 97112 NEUROMUSCULAR REEDUCATION: CPT

## 2025-03-20 PROCEDURE — 97140 MANUAL THERAPY 1/> REGIONS: CPT

## 2025-03-20 NOTE — PROGRESS NOTES
PHYSICAL / OCCUPATIONAL THERAPY - DAILY TREATMENT NOTE     Patient Name: Cynthia Kumar    Date: 3/20/2025    : 1955  Insurance: Payor: [employer]IO MEDICARE / Plan: [employer] MEDICARE-ADVANTAGE PPO / Product Type: Medicare /      Patient  verified Yes     Visit #   Current / Total 2 24   Time   In / Out 5:10 6:10   Pain   In / Out 1 1   Subjective Functional Status/Changes: Pt reports that she feels fine upon arrival today.   Changes to:  Allergies, Med Hx, Sx Hx?   no       TREATMENT AREA =  Sacrococcygeal disorders, not elsewhere classified [M53.3]  Other low back pain [M54.59]    If an interpreting service is utilized for treatment of this patient, the contents of this document represent the material reviewed with the patient via the .       Therapeutic Procedures:  Tx Min Billable or 1:1 Min (if diff from Tx Min) Procedure, Rationale, Specifics   12  14416 Manual Therapy (timed):  decrease pain and increase tissue extensibility to improve patient's ability to progress to PLOF and address remaining functional goals.  The manual therapy interventions were performed at a separate and distinct time from the therapeutic activities interventions . Details: STM to lumbar spine, bilateral LAD    Details if applicable:       15  09327 Neuromuscular Re-Education (timed):  improve balance, coordination, kinesthetic sense, posture, core stability and proprioception to improve patient's ability to develop conscious control of individual muscles and awareness of position of extremities in order to progress to PLOF and address remaining functional goals. (see flow sheet as applicable)    Details if applicable:     33  28423 Therapeutic Exercise (timed):  increase ROM, strength, coordination, balance, and proprioception to improve patient's ability to progress to PLOF and address remaining functional goals. (see flow sheet as applicable)     Details if applicable:           Details if applicable:

## 2025-03-26 ENCOUNTER — HOSPITAL ENCOUNTER (OUTPATIENT)
Facility: HOSPITAL | Age: 70
Setting detail: RECURRING SERIES
Discharge: HOME | End: 2025-03-29
Payer: MEDICARE

## 2025-03-26 PROCEDURE — 97530 THERAPEUTIC ACTIVITIES: CPT

## 2025-03-26 PROCEDURE — 97140 MANUAL THERAPY 1/> REGIONS: CPT

## 2025-03-26 PROCEDURE — 97110 THERAPEUTIC EXERCISES: CPT

## 2025-03-26 NOTE — PROGRESS NOTES
PHYSICAL / OCCUPATIONAL THERAPY - DAILY TREATMENT NOTE     Patient Name: Cynthia Kumar    Date: 3/26/2025    : 1955  Insurance: Payor: [employer]IO MEDICARE / Plan: AETNA MEDICARE-ADVANTAGE PPO / Product Type: Medicare /      Patient  verified Yes     Visit #   Current / Total 3 24   Time   In / Out 9:52 11:32   Pain   In / Out 1 1   Subjective Functional Status/Changes: Pt reports that she is having some HS tightness upon arrival today.   Changes to:  Allergies, Med Hx, Sx Hx?   no       TREATMENT AREA =  Sacrococcygeal disorders, not elsewhere classified [M53.3]  Other low back pain [M54.59]    If an interpreting service is utilized for treatment of this patient, the contents of this document represent the material reviewed with the patient via the .       Therapeutic Procedures:  Tx Min Billable or 1:1 Min (if diff from Tx Min) Procedure, Rationale, Specifics   10  99112 Manual Therapy (timed):  decrease pain, increase ROM, and increase tissue extensibility to improve patient's ability to progress to PLOF and address remaining functional goals.  The manual therapy interventions were performed at a separate and distinct time from the therapeutic activities interventions . Details: STM to lumbar spine    Details if applicable:       15  38727 Therapeutic Exercise (timed):  increase ROM, strength, coordination, balance, and proprioception to improve patient's ability to progress to PLOF and address remaining functional goals. (see flow sheet as applicable)    Details if applicable:     15  94943 Therapeutic Activity (timed):  use of dynamic activities replicating functional movements to increase ROM, strength, coordination, balance, and proprioception in order to improve patient's ability to progress to PLOF and address remaining functional goals.  (see flow sheet as applicable)     Details if applicable:           Details if applicable:            Details if applicable:     40  [facility] Totals

## 2025-03-28 ENCOUNTER — HOSPITAL ENCOUNTER (OUTPATIENT)
Facility: HOSPITAL | Age: 70
Setting detail: RECURRING SERIES
Discharge: HOME | End: 2025-03-31
Payer: MEDICARE

## 2025-03-28 PROCEDURE — 97140 MANUAL THERAPY 1/> REGIONS: CPT

## 2025-03-28 PROCEDURE — 97110 THERAPEUTIC EXERCISES: CPT

## 2025-03-28 PROCEDURE — 97530 THERAPEUTIC ACTIVITIES: CPT

## 2025-03-28 NOTE — PROGRESS NOTES
PHYSICAL / OCCUPATIONAL THERAPY - DAILY TREATMENT NOTE     Patient Name: Cynthia Kumar    Date: 3/28/2025    : 1955  Insurance: Payor: [employer]IO MEDICARE / Plan: AETNA MEDICARE-ADVANTAGE PPO / Product Type: Medicare /      Patient  verified Yes     Visit #   Current / Total 4 46   Time   In / Out 11:10 11:50   Pain   In / Out 0 1   Subjective Functional Status/Changes: Pt reports some continued low back soreness upon arrival today.   Changes to:  Allergies, Med Hx, Sx Hx?   no       TREATMENT AREA =  Sacrococcygeal disorders, not elsewhere classified [M53.3]  Other low back pain [M54.59]    If an interpreting service is utilized for treatment of this patient, the contents of this document represent the material reviewed with the patient via the .     Therapeutic Procedures:  Tx Min Billable or 1:1 Min (if diff from Tx Min) Procedure, Rationale, Specifics   15  58829 Therapeutic Exercise (timed):  increase ROM, strength, coordination, balance, and proprioception to improve patient's ability to progress to PLOF and address remaining functional goals. (see flow sheet as applicable)    Details if applicable:       15  32161 Therapeutic Activity (timed):  use of dynamic activities replicating functional movements to increase ROM, strength, coordination, balance, and proprioception in order to improve patient's ability to progress to PLOF and address remaining functional goals.  (see flow sheet as applicable)    Details if applicable:     10  60465 Manual Therapy (timed):  decrease pain, increase ROM, and increase tissue extensibility to improve patient's ability to progress to PLOF and address remaining functional goals.  The manual therapy interventions were performed at a separate and distinct time from the therapeutic activities interventions . Details: Inferior left hip mobs      Details if applicable:           Details if applicable:            Details if applicable:     40  [facility] Totals

## 2025-04-02 ENCOUNTER — HOSPITAL ENCOUNTER (OUTPATIENT)
Facility: HOSPITAL | Age: 70
Setting detail: RECURRING SERIES
Discharge: HOME | End: 2025-04-05
Payer: MEDICARE

## 2025-04-02 PROCEDURE — 97140 MANUAL THERAPY 1/> REGIONS: CPT

## 2025-04-02 PROCEDURE — 97110 THERAPEUTIC EXERCISES: CPT

## 2025-04-02 PROCEDURE — 97530 THERAPEUTIC ACTIVITIES: CPT

## 2025-04-02 NOTE — PROGRESS NOTES
PHYSICAL / OCCUPATIONAL THERAPY - DAILY TREATMENT NOTE     Patient Name: Cynthia Kumar    Date: 2025    : 1955  Insurance: Payor: JOS[employer]ARB MEDICARE / Plan: AETNA MEDICARE-ADVANTAGE PPO / Product Type: Medicare /      Patient  verified Yes     Visit #   Current / Total 5 24   Time   In / Out 11:06 11:50   Pain   In / Out 2 2   Subjective Functional Status/Changes: Pt reports that she has some lwo back pain upon arrical today, but feels fine upon arrival today    Changes to:  Allergies, Med Hx, Sx Hx?   no       TREATMENT AREA =  Sacrococcygeal disorders, not elsewhere classified [M53.3]  Other low back pain [M54.59]    If an interpreting service is utilized for treatment of this patient, the contents of this document represent the material reviewed with the patient via the .       Therapeutic Procedures:  Tx Min Billable or 1:1 Min (if diff from Tx Min) Procedure, Rationale, Specifics   20  68240 Therapeutic Exercise (timed):  increase ROM, strength, coordination, balance, and proprioception to improve patient's ability to progress to PLOF and address remaining functional goals. (see flow sheet as applicable)    Details if applicable:       16  09353 Therapeutic Activity (timed):  use of dynamic activities replicating functional movements to increase ROM, strength, coordination, balance, and proprioception in order to improve patient's ability to progress to PLOF and address remaining functional goals.  (see flow sheet as applicable)    Details if applicable:     8  08444 Manual Therapy (timed):  increase ROM and increase tissue extensibility to improve patient's ability to progress to PLOF and address remaining functional goals.  The manual therapy interventions were performed at a separate and distinct time from the therapeutic activities interventions . Details: Inferior L hip mobs, LAD      Details if applicable:           Details if applicable:            Details if applicable:     44

## 2025-04-04 ENCOUNTER — HOSPITAL ENCOUNTER (OUTPATIENT)
Facility: HOSPITAL | Age: 70
Setting detail: RECURRING SERIES
Discharge: HOME | End: 2025-04-07
Payer: COMMERCIAL

## 2025-04-04 PROCEDURE — 97110 THERAPEUTIC EXERCISES: CPT

## 2025-04-04 PROCEDURE — 97112 NEUROMUSCULAR REEDUCATION: CPT

## 2025-04-04 PROCEDURE — 97140 MANUAL THERAPY 1/> REGIONS: CPT

## 2025-04-04 NOTE — PROGRESS NOTES
PHYSICAL / OCCUPATIONAL THERAPY - DAILY TREATMENT NOTE     Patient Name: Cynthia Kumar    Date: 2025    : 1955  Insurance: Payor: JOSHBARB MEDICARE / Plan: T MEDICARE-ADVANTAGE PPO / Product Type: Medicare /      Patient  verified Yes     Visit #   Current / Total 6 24   Time   In / Out 153 240   Pain   In / Out 0-1 0   Subjective Functional Status/Changes: Less pain overall however still painful with throwing the ball and walking long distances    Changes to:  Allergies, Med Hx, Sx Hx?   no       TREATMENT AREA =  Sacrococcygeal disorders, not elsewhere classified [M53.3]  Other low back pain [M54.59]    If an interpreting service is utilized for treatment of this patient, the contents of this document represent the material reviewed with the patient via the .     OBJECTIVE      Therapeutic Procedures:  Tx Min Billable or 1:1 Min (if diff from Tx Min) Procedure, Rationale, Specifics    15 48673 Therapeutic Exercise (timed):  increase ROM, strength, coordination, balance, and proprioception to improve patient's ability to progress to PLOF and address remaining functional goals. (see flow sheet as applicable)    Details if applicable:       15 15 82782 Neuromuscular Re-Education (timed):  improve balance, coordination, kinesthetic sense, posture, core stability and proprioception to improve patient's ability to develop conscious control of individual muscles and awareness of position of extremities in order to progress to PLOF and address remaining functional goals. (see flow sheet as applicable)    Details if applicable:     10 10 91495 Manual Therapy (timed):  decrease pain, increase ROM, and increase tissue extensibility to improve patient's ability to progress to PLOF and address remaining functional goals.  The manual therapy interventions were performed at a separate and distinct time from the therapeutic activities interventions . Details: STM to left gluts and piriformis in

## 2025-04-09 ENCOUNTER — HOSPITAL ENCOUNTER (OUTPATIENT)
Facility: HOSPITAL | Age: 70
Setting detail: RECURRING SERIES
Discharge: HOME | End: 2025-04-12
Payer: COMMERCIAL

## 2025-04-09 PROCEDURE — 97530 THERAPEUTIC ACTIVITIES: CPT

## 2025-04-09 PROCEDURE — 97110 THERAPEUTIC EXERCISES: CPT

## 2025-04-09 PROCEDURE — 97140 MANUAL THERAPY 1/> REGIONS: CPT

## 2025-04-09 NOTE — PROGRESS NOTES
demonstrate improved function.   Eval: Oswestry: 34%   2. Patient will improve (B) hip strength to 4/5 to increase ease with household tasks.               Eval: Hip strength: Flexion: (B) 4-/5, abduction: right: 4-/5, left: 3+/5, Extension: (B) 3+/5   Current 4/9/2025: Flexion 4/5 Bilaterally, Extension 4-/5 Bilaterally  3. Patient will report being able to walk for 30 minutes with pain no more then 1/10 to increase ease with community Adls.               Eval: walking 30 minutes (pain level 5-6/10)    PLAN  Yes  Continue plan of care  []  Upgrade activities as tolerated  []  Discharge due to :  []  Other:    Leo Melo PT    4/9/2025    12:37 PM    Future Appointments   Date Time Provider Department Center   4/11/2025 10:30 AM Jessica Swain PT [facility]   4/16/2025  1:10 PM Leo Melo PT [facility]   4/18/2025 12:30 PM Leo Melo PT [facility]   4/23/2025 12:30 PM Leo Melo PT [facility]   4/25/2025 12:30 PM Leo Melo PT [facility]   4/30/2025 12:30 PM Leo Melo PT [facility]   5/2/2025 12:30 PM Leo Melo PT [facility]

## 2025-04-11 ENCOUNTER — HOSPITAL ENCOUNTER (OUTPATIENT)
Facility: HOSPITAL | Age: 70
Setting detail: RECURRING SERIES
Discharge: HOME | End: 2025-04-14
Payer: COMMERCIAL

## 2025-04-11 PROCEDURE — 97140 MANUAL THERAPY 1/> REGIONS: CPT

## 2025-04-11 PROCEDURE — 97110 THERAPEUTIC EXERCISES: CPT

## 2025-04-11 PROCEDURE — 97112 NEUROMUSCULAR REEDUCATION: CPT

## 2025-04-11 NOTE — PROGRESS NOTES
PHYSICAL / OCCUPATIONAL THERAPY - DAILY TREATMENT NOTE     Patient Name: Cynthia Kumar    Date: 2025    : 1955  Insurance: Payor: JOSHBARB MEDICARE / Plan: AET MEDICARE-ADVANTAGE PPO / Product Type: Medicare /      Patient  verified Yes     Visit #   Current / Total 8 24   Time   In / Out 10:32  11:24   Pain   In / Out 0.3/10  0   Subjective Functional Status/Changes: Patient stated she thinks she is heading in the right direction, but still has pain.    Changes to:  Allergies, Med Hx, Sx Hx?   no       TREATMENT AREA =  Sacrococcygeal disorders, not elsewhere classified [M53.3]  Other low back pain [M54.59]    If an interpreting service is utilized for treatment of this patient, the contents of this document represent the material reviewed with the patient via the .     OBJECTIVE    Therapeutic Procedures:  Tx Min Billable or 1:1 Min (if diff from Tx Min) Procedure, Rationale, Specifics   29 26 43895 Therapeutic Exercise (timed):  increase ROM, strength, coordination, balance, and proprioception to improve patient's ability to progress to PLOF and address remaining functional goals. (see flow sheet as applicable)    Details if applicable:       15 15 56277 Neuromuscular Re-Education (timed):  improve balance, coordination, kinesthetic sense, posture, core stability and proprioception to improve patient's ability to develop conscious control of individual muscles and awareness of position of extremities in order to progress to PLOF and address remaining functional goals. (see flow sheet as applicable)    Details if applicable:     8  76784 Manual Therapy (timed):  decrease pain, increase ROM, and increase tissue extensibility to improve patient's ability to progress to PLOF and address remaining functional goals.  The manual therapy interventions were performed at a separate and distinct time from the therapeutic activities interventions . Details: in S/L; STM to left glut/piriformis

## 2025-04-16 ENCOUNTER — HOSPITAL ENCOUNTER (OUTPATIENT)
Facility: HOSPITAL | Age: 70
Setting detail: RECURRING SERIES
Discharge: HOME | End: 2025-04-19
Payer: COMMERCIAL

## 2025-04-16 PROCEDURE — 97140 MANUAL THERAPY 1/> REGIONS: CPT

## 2025-04-16 PROCEDURE — 97110 THERAPEUTIC EXERCISES: CPT

## 2025-04-16 PROCEDURE — 97530 THERAPEUTIC ACTIVITIES: CPT

## 2025-04-16 NOTE — PROGRESS NOTES
PHYSICAL / OCCUPATIONAL THERAPY - DAILY TREATMENT NOTE     Patient Name: Cynthia Kumar    Date: 2025    : 1955  Insurance: Payor: AETNA / Plan: AETNA / Product Type: *No Product type* /      Patient  verified Yes     Visit #   Current / Total 9 24   Time   In / Out 1:10 1:59   Pain   In / Out 1 1   Subjective Functional Status/Changes: Pt reports that she has some increased lumbar stiffness    Changes to:  Allergies, Med Hx, Sx Hx?   no       TREATMENT AREA =  Sacrococcygeal disorders, not elsewhere classified [M53.3]  Other low back pain [M54.59]    If an interpreting service is utilized for treatment of this patient, the contents of this document represent the material reviewed with the patient via the .       Therapeutic Procedures:  Tx Min Billable or 1:1 Min (if diff from Tx Min) Procedure, Rationale, Specifics   10  67186 Manual Therapy (timed):  decrease pain and increase tissue extensibility to improve patient's ability to progress to PLOF and address remaining functional goals.  The manual therapy interventions were performed at a separate and distinct time from the therapeutic activities interventions . Details: STM to lumbar spine, inferior hip mobs    Details if applicable:         78158 Therapeutic Exercise (timed):  increase ROM, strength, coordination, balance, and proprioception to improve patient's ability to progress to PLOF and address remaining functional goals. (see flow sheet as applicable)    Details if applicable:       33848 Therapeutic Activity (timed):  use of dynamic activities replicating functional movements to increase ROM, strength, coordination, balance, and proprioception in order to improve patient's ability to progress to PLOF and address remaining functional goals.  (see flow sheet as applicable)     Details if applicable:           Details if applicable:            Details if applicable:     49  [facility] Totals Reminder: bill using total billable

## 2025-04-18 ENCOUNTER — HOSPITAL ENCOUNTER (OUTPATIENT)
Facility: HOSPITAL | Age: 70
Setting detail: RECURRING SERIES
Discharge: HOME | End: 2025-04-21
Payer: COMMERCIAL

## 2025-04-18 PROCEDURE — 97530 THERAPEUTIC ACTIVITIES: CPT

## 2025-04-18 PROCEDURE — 97110 THERAPEUTIC EXERCISES: CPT

## 2025-04-18 NOTE — PROGRESS NOTES
[facility]  [address]  [place]  [address]  Phone: [phone]      Fax:  [phone]    Progress Note  Patient name: Cynthia Kumar Start of Care: 3/18/2025    Referral source: Vic Dykes MD : 1955    Medical Diagnosis: Sacrococcygeal disorders, not elsewhere classified   Payor: AETNA / Plan: AETNA / Product Type: *No Product type* /  Onset Date:2024     Treatment Diagnosis: M54.59 OTHER LOWER BACK PAIN    Prior Hospitalization: see medical history Provider#: [id number]   Comorbidities: Depression, high blood pressure, osteoporosis, Thyroid problems, Pemphigus Vulgaris     Prior Level of Function:  Patient reports prior to 2024 was able to walk longer distances without as much discomfort.     Reporting Period: 3/18/2025-2025    Visits from Start of Care: 10    Missed Visits: 0      If an interpreting service is utilized for treatment of this patient, the contents of this document represent the material reviewed with the patient via the .     Goals/Measure of Progress: To be achieved in 8 weeks:    Short Term Goals: To be accomplished in 12 treatments     Patient will be independent with HEP to increase ease with ADLs.   Eval: HEP established  PN 2025: Reports continued compliance with HEP, MET  2. Patient will report pain at worst no more then 1/10 to increase ease with self care activities.               Eval: pain at worst: 4/10   PN 2025: 0/10 today, reported minimal instances of inc pain, stating usually just in tension, MET  3. Patient will maintain (B) SLS on airex for 30 seconds without LOB to increase ease with ambulating on uneven surfaces.               Eval: SLS on floor with EO: 20 sec without LOB but noted decreased ankle stability (B)               Current on 25: Progressing: SLS on floor with EO for 30 seconds without LOB (B)  PN 2025: right: 15\", Left 20\" progressing  Long Term 
15\", Left 20\" progressing  Long Term Goals: To be accomplished in 24 treatments  Patient will improve Oswestry to 20% or less to demonstrate improved function.   Eval: Oswestry: 34%   PN 4/18/2025: 25%, progressing   2. Patient will improve (B) hip strength to 4/5 to increase ease with household tasks.               Eval: Hip strength: Flexion: (B) 4-/5, abduction: right: 4-/5, left: 3+/5, Extension: (B) 3+/5   Current 4/16/2025: Flexion 4/5 Bilaterally, Extension 4-/5 Bilaterally  PN 4/18/2025: Flexion R - 4/5 Bilaterally L 4-/5, Extension 4-/5 Bilaterally, ABD 4-/5 bilaterally, progressing   3. Patient will report being able to walk for 30 minutes with pain no more then 1/10 to increase ease with community Adls.               Eval: walking 30 minutes (pain level 5-6/10)              PN 4/18/2025: reports that she hasn't bene walking regularly to test, progressing    PLAN  Yes  Continue plan of care  []  Upgrade activities as tolerated  []  Discharge due to :  []  Other:    Leo Melo PT    4/18/2025    12:33 PM    Future Appointments   Date Time Provider Department Center   4/23/2025 12:30 PM Leo Melo PT [facility]   4/25/2025 12:30 PM Leo Melo PT [facility]   4/30/2025 12:30 PM Leo Melo PT [facility]   5/2/2025 12:30 PM Leo Melo PT [facility]   5/14/2025 10:30 AM HBV BONE DEXA RM 1 [facility]   5/19/2025  3:40 PM HBV SPENCER RM 4 3D [facility]

## 2025-04-23 ENCOUNTER — HOSPITAL ENCOUNTER (OUTPATIENT)
Facility: HOSPITAL | Age: 70
Setting detail: RECURRING SERIES
Discharge: HOME | End: 2025-04-26
Payer: COMMERCIAL

## 2025-04-23 PROCEDURE — 97140 MANUAL THERAPY 1/> REGIONS: CPT

## 2025-04-23 PROCEDURE — 97112 NEUROMUSCULAR REEDUCATION: CPT

## 2025-04-23 PROCEDURE — 97530 THERAPEUTIC ACTIVITIES: CPT

## 2025-04-23 NOTE — PROGRESS NOTES
PHYSICAL / OCCUPATIONAL THERAPY - DAILY TREATMENT NOTE     Patient Name: Cynthia Kumar    Date: 2025    : 1955  Insurance: Payor: AETNA / Plan: AETNA / Product Type: *No Product type* /      Patient  verified Yes     Visit #   Current / Total 11 24   Time   In / Out 12:30 1:12   Pain   In / Out 0 0   Subjective Functional Status/Changes: Pt reports on inc low back pain upon arrival today.   Changes to:  Allergies, Med Hx, Sx Hx?   no       TREATMENT AREA =  Sacrococcygeal disorders, not elsewhere classified [M53.3]  Other low back pain [M54.59]    If an interpreting service is utilized for treatment of this patient, the contents of this document represent the material reviewed with the patient via the .       Therapeutic Procedures:  Tx Min Billable or 1:1 Min (if diff from Tx Min) Procedure, Rationale, Specifics   10  03535 Manual Therapy (timed):  decrease pain and increase tissue extensibility to improve patient's ability to progress to PLOF and address remaining functional goals.  The manual therapy interventions were performed at a separate and distinct time from the therapeutic activities interventions . Details: STM to lumbar spine    Details if applicable:       15  87173 Neuromuscular Re-Education (timed):  improve balance, coordination, kinesthetic sense, posture, core stability and proprioception to improve patient's ability to develop conscious control of individual muscles and awareness of position of extremities in order to progress to PLOF and address remaining functional goals. (see flow sheet as applicable)    Details if applicable:     17  00757 Therapeutic Activity (timed):  use of dynamic activities replicating functional movements to increase ROM, strength, coordination, balance, and proprioception in order to improve patient's ability to progress to PLOF and address remaining functional goals.  (see flow sheet as applicable)     Details if applicable:

## 2025-04-25 ENCOUNTER — HOSPITAL ENCOUNTER (OUTPATIENT)
Facility: HOSPITAL | Age: 70
Setting detail: RECURRING SERIES
Discharge: HOME | End: 2025-04-28
Payer: COMMERCIAL

## 2025-04-25 PROCEDURE — 97112 NEUROMUSCULAR REEDUCATION: CPT

## 2025-04-25 PROCEDURE — 97140 MANUAL THERAPY 1/> REGIONS: CPT

## 2025-04-25 PROCEDURE — 97110 THERAPEUTIC EXERCISES: CPT

## 2025-04-25 NOTE — PROGRESS NOTES
43  [facility] Totals Reminder: bill using total billable min of TIMED therapeutic procedures (example: do not include dry needle or estim unattended, both untimed codes, in totals to left)  8-22 min = 1 unit; 23-37 min = 2 units; 38-52 min = 3 units; 53-67 min = 4 units; 68-82 min = 5 units   Total Total     TOTAL TREATMENT TIME:        43     Charge Capture    [x]  Patient Education billed concurrently with other procedures   [x] Review HEP    [] Progressed/Changed HEP, detail:    [] Other detail:       Objective Information/Functional Measures/Assessment    Strengthening program continued today. Able to perform strengthening therex with no complaints of inc pain.    Patient will continue to benefit from skilled PT / OT services to modify and progress therapeutic interventions, analyze and address functional mobility deficits, analyze and address soft tissue restrictions, and analyze and cue for proper movement patterns to address functional deficits and attain remaining goals.    Progress toward goals / Updated goals:  []  See Progress Note/Recertification    Short Term Goals: To be accomplished in 12 treatments     Patient will be independent with HEP to increase ease with ADLs.   Eval: HEP established  PN 4/18/2025: Reports continued compliance with HEP, MET  2. Patient will report pain at worst no more then 1/10 to increase ease with self care activities.               Eval: pain at worst: 4/10   PN 4/18/2025: 0/10 today, reported minimal instances of inc pain, stating usually just in tension, MET  3. Patient will maintain (B) SLS on airex for 30 seconds without LOB to increase ease with ambulating on uneven surfaces.               Eval: SLS on floor with EO: 20 sec without LOB but noted decreased ankle stability (B)               Current on 4/11/25: Progressing: SLS on floor with EO for 30 seconds without LOB (B)  PN 4/18/2025: right: 15\", Left 20\" progressing  Long Term Goals: To be accomplished in 24

## 2025-04-30 ENCOUNTER — HOSPITAL ENCOUNTER (OUTPATIENT)
Facility: HOSPITAL | Age: 70
Setting detail: RECURRING SERIES
Discharge: HOME | End: 2025-05-03
Payer: COMMERCIAL

## 2025-04-30 PROCEDURE — 97112 NEUROMUSCULAR REEDUCATION: CPT

## 2025-04-30 PROCEDURE — 97530 THERAPEUTIC ACTIVITIES: CPT

## 2025-04-30 PROCEDURE — 97110 THERAPEUTIC EXERCISES: CPT

## 2025-04-30 NOTE — PROGRESS NOTES
seconds without LOB (B)  PN 4/18/2025: right: 15\", Left 20\" progressing  Current 4/30/2025: Left - 17\" Right 20\"  Long Term Goals: To be accomplished in 24 treatments  Patient will improve Oswestry to 20% or less to demonstrate improved function.   Eval: Oswestry: 34%   PN 4/18/2025: 25%, progressing   2. Patient will improve (B) hip strength to 4/5 to increase ease with household tasks.               Eval: Hip strength: Flexion: (B) 4-/5, abduction: right: 4-/5, left: 3+/5, Extension: (B) 3+/5   Current 4/16/2025: Flexion 4/5 Bilaterally, Extension 4-/5 Bilaterally  PN 4/23/2025: Flexion R - 4/5 Bilaterally L 4/5, Extension 4-/5 Bilaterally, ABD 4-/5 bilaterally, progressing  3. Patient will report being able to walk for 30 minutes with pain no more then 1/10 to increase ease with community Adls.               Eval: walking 30 minutes (pain level 5-6/10)              PN 4/18/2025: reports that she hasn't been walking regularly to test, progressing              Current 4/25/2028: reports no recent inc pain with walking       PLAN  Yes  Continue plan of care  []  Upgrade activities as tolerated  []  Discharge due to :  []  Other:    Leo Melo PT    4/30/2025    1:07 PM    Future Appointments   Date Time Provider Department Center   5/2/2025 12:30 PM Leo Melo PT [facility]   5/7/2025 10:30 AM Leo Melo PT [facility]   5/9/2025  1:10 PM [facility]   5/13/2025 12:30 [facility]   5/14/2025 10:30 AM HBV BONE DEXA RM 1 [facility]   5/16/2025 12:30 PM [facility]   5/19/2025  3:40 PM HBV SPENCER RM 4 3D [facility]   5/21/2025 12:30 PM Leo Melo PT [facility]   5/23/2025 12:30 PM [facility]   5/28/2025 12:30 PM Leo Melo PT [facility]   5/30/2025 12:30 PM Leo Melo, EMPERATRIZ [facility]

## 2025-05-02 ENCOUNTER — HOSPITAL ENCOUNTER (OUTPATIENT)
Facility: HOSPITAL | Age: 70
Setting detail: RECURRING SERIES
Discharge: HOME | End: 2025-05-05
Payer: COMMERCIAL

## 2025-05-02 PROCEDURE — 97530 THERAPEUTIC ACTIVITIES: CPT

## 2025-05-02 PROCEDURE — 97112 NEUROMUSCULAR REEDUCATION: CPT

## 2025-05-02 PROCEDURE — 97110 THERAPEUTIC EXERCISES: CPT

## 2025-05-02 NOTE — PROGRESS NOTES
PHYSICAL / OCCUPATIONAL THERAPY - DAILY TREATMENT NOTE     Patient Name: Cynthia Kumar    Date: 2025    : 1955  Insurance: Payor: AETNA / Plan: AETNA / Product Type: *No Product type* /      Patient  verified Yes     Visit #   Current / Total 14 24   Time   In / Out 12:30 1:11   Pain   In / Out 0 0   Subjective Functional Status/Changes: Pt reports continued improvements in her low back pain.   Changes to:  Allergies, Med Hx, Sx Hx?   no       TREATMENT AREA =  Sacrococcygeal disorders, not elsewhere classified [M53.3]  Other low back pain [M54.59]    If an interpreting service is utilized for treatment of this patient, the contents of this document represent the material reviewed with the patient via the .       Therapeutic Procedures:  Tx Min Billable or 1:1 Min (if diff from Tx Min) Procedure, Rationale, Specifics   15  68347 Therapeutic Exercise (timed):  increase ROM, strength, coordination, balance, and proprioception to improve patient's ability to progress to PLOF and address remaining functional goals. (see flow sheet as applicable)    Details if applicable:       15  70993 Neuromuscular Re-Education (timed):  improve balance, coordination, kinesthetic sense, posture, core stability and proprioception to improve patient's ability to develop conscious control of individual muscles and awareness of position of extremities in order to progress to PLOF and address remaining functional goals. (see flow sheet as applicable)    Details if applicable:     11  76960 Therapeutic Activity (timed):  use of dynamic activities replicating functional movements to increase ROM, strength, coordination, balance, and proprioception in order to improve patient's ability to progress to PLOF and address remaining functional goals.  (see flow sheet as applicable)     Details if applicable:           Details if applicable:            Details if applicable:     41  [facility] Totals Reminder: bill using

## 2025-05-07 ENCOUNTER — HOSPITAL ENCOUNTER (OUTPATIENT)
Facility: HOSPITAL | Age: 70
Setting detail: RECURRING SERIES
Discharge: HOME | End: 2025-05-10
Payer: COMMERCIAL

## 2025-05-07 PROCEDURE — 97110 THERAPEUTIC EXERCISES: CPT

## 2025-05-07 PROCEDURE — 97112 NEUROMUSCULAR REEDUCATION: CPT

## 2025-05-07 PROCEDURE — 97140 MANUAL THERAPY 1/> REGIONS: CPT

## 2025-05-09 ENCOUNTER — TELEPHONE (OUTPATIENT)
Facility: HOSPITAL | Age: 70
End: 2025-05-09

## 2025-05-09 ENCOUNTER — HOSPITAL ENCOUNTER (OUTPATIENT)
Facility: HOSPITAL | Age: 70
Setting detail: RECURRING SERIES
Discharge: HOME | End: 2025-05-12
Payer: MEDICARE

## 2025-05-09 PROCEDURE — 97110 THERAPEUTIC EXERCISES: CPT

## 2025-05-09 PROCEDURE — 97112 NEUROMUSCULAR REEDUCATION: CPT

## 2025-05-09 PROCEDURE — 97140 MANUAL THERAPY 1/> REGIONS: CPT

## 2025-05-09 NOTE — PROGRESS NOTES
Eval: SLS on floor with EO: 20 sec without LOB but noted decreased ankle stability (B)               Current on 4/11/25: Progressing: SLS on floor with EO for 30 seconds without LOB (B)  PN 4/18/2025: right: 15\", Left 20\" progressing  Current 4/30/2025: Left - 17\" Right 20\"  Long Term Goals: To be accomplished in 24 treatments  Patient will improve Oswestry to 20% or less to demonstrate improved function.   Eval: Oswestry: 34%   PN 4/18/2025: 25%, progressing   2. Patient will improve (B) hip strength to 4/5 to increase ease with household tasks.               Eval: Hip strength: Flexion: (B) 4-/5, abduction: right: 4-/5, left: 3+/5, Extension: (B) 3+/5   Current 4/16/2025: Flexion 4/5 Bilaterally, Extension 4-/5 Bilaterally  PN 4/23/2025: Flexion R - 4/5 Bilaterally L 4/5, Extension 4-/5 Bilaterally, ABD 4-/5 bilaterally, progressing  3. Patient will report being able to walk for 30 minutes with pain no more then 1/10 to increase ease with community Adls.               Eval: walking 30 minutes (pain level 5-6/10)              PN 4/18/2025: reports that she hasn't been walking regularly to test, progressing              Current 5/2/2028: reports no recent inc pain with walking    PLAN  Yes  Continue plan of care  []  Upgrade activities as tolerated  []  Discharge due to :  []  Other:    Leo Melo PT    5/9/2025    1:31 PM    Future Appointments   Date Time Provider Department Center   5/13/2025 12:30 PM Leo Melo PT [facility]   5/14/2025 10:30 AM HBV BONE DEXA RM 1 [facility]   5/19/2025  3:40 PM HBV SPENCER RM 4 3D [facility]   5/21/2025 12:30 PM Leo Melo PT [facility]   5/23/2025 12:30 PM Leo Melo PT [facility]   5/28/2025 12:30 PM Leo Melo PT [facility]   5/30/2025 12:30 PM Leo Melo PT [facility]

## 2025-05-09 NOTE — TELEPHONE ENCOUNTER
pt cxl's appt b/c she has a graduation party to go to, pt was unavailable for the rest of the week to reschedule the appt   
Statement Selected

## 2025-05-13 ENCOUNTER — HOSPITAL ENCOUNTER (OUTPATIENT)
Facility: HOSPITAL | Age: 70
Setting detail: RECURRING SERIES
Discharge: HOME | End: 2025-05-16
Payer: MEDICARE

## 2025-05-13 ENCOUNTER — APPOINTMENT (OUTPATIENT)
Facility: HOSPITAL | Age: 70
End: 2025-05-13
Payer: MEDICARE

## 2025-05-13 PROCEDURE — 97530 THERAPEUTIC ACTIVITIES: CPT

## 2025-05-13 PROCEDURE — 97112 NEUROMUSCULAR REEDUCATION: CPT

## 2025-05-13 NOTE — PROGRESS NOTES
PHYSICAL / OCCUPATIONAL THERAPY - DAILY TREATMENT NOTE     Patient Name: Cynthia Kumar    Date: 2025    : 1955  Insurance: Payor: AETNA / Plan: AETNA / Product Type: *No Product type* /      Patient  verified Yes     Visit #   Current / Total 17 24   Time   In / Out 12:30 1:14   Pain   In / Out 1 1   Subjective Functional Status/Changes: Pt reports continued improvements in her back pain. Reports that she did have a day last week where her back pain was bad, but she feels better upon arrival today.   Changes to:  Allergies, Med Hx, Sx Hx?   no       TREATMENT AREA =  Sacrococcygeal disorders, not elsewhere classified [M53.3]  Other low back pain [M54.59]    If an interpreting service is utilized for treatment of this patient, the contents of this document represent the material reviewed with the patient via the .       Therapeutic Procedures:  Tx Min Billable or 1:1 Min (if diff from Tx Min) Procedure, Rationale, Specifics   30  35554 Therapeutic Activity (timed):  use of dynamic activities replicating functional movements to increase ROM, strength, coordination, balance, and proprioception in order to improve patient's ability to progress to PLOF and address remaining functional goals.  (see flow sheet as applicable)    Details if applicable:       14  55158 Neuromuscular Re-Education (timed):  improve balance, coordination, kinesthetic sense, posture, core stability and proprioception to improve patient's ability to develop conscious control of individual muscles and awareness of position of extremities in order to progress to PLOF and address remaining functional goals. (see flow sheet as applicable)    Details if applicable:            Details if applicable:           Details if applicable:            Details if applicable:     44  [facility] Totals Reminder: bill using total billable min of TIMED therapeutic procedures (example: do not include dry needle or estim unattended, both

## 2025-05-13 NOTE — PROGRESS NOTES
[facility]  [address]  [place]  [address]  Phone: [phone]      Fax:  [phone]    Progress Note  Patient name: Cynthia Kumar Start of Care: 3/18/2025   Referral source: Vic Dykes MD : 1955    Medical Diagnosis: Sacrococcygeal disorders, not elsewhere classified   Payor: AETNA / Plan: AETNA / Product Type: *No Product type* /  Onset Date:2024    Treatment Diagnosis:  M54.59 OTHER LOWER BACK PAIN    Prior Hospitalization: see medical history Provider#: [id number]   Comorbidities: Depression, high blood pressure, osteoporosis, Thyroid problems, Pemphigus Vulgaris     Prior Level of Function:  Patient reports prior to 2024 was able to walk longer distances without as much discomfort.     Reporting Period: 2025-2025    Visits from Start of Care: 17    Missed Visits: 0      If an interpreting service is utilized for treatment of this patient, the contents of this document represent the material reviewed with the patient via the .     Goals/Measure of Progress: To be achieved in 12 weeks:    Short Term Goals: To be accomplished in 12 treatments     Patient will be independent with HEP to increase ease with ADLs.   Eval: HEP established  PN 2025: Reports continued compliance with HEP, MET  2. Patient will report pain at worst no more then 1/10 to increase ease with self care activities.               Eval: pain at worst: 4/10   PN 2025: 0/10 today, reported minimal instances of inc pain, stating usually just in tension, MET  Current 2025: reports 4/10 upon arrival today from repetitive lifting.  PN 2025: Reports continued improvements in her back pain, reporting 1/10 today, progressing  3. Patient will maintain (B) SLS on airex for 30 seconds without LOB to increase ease with ambulating on uneven surfaces.               Eval: SLS on floor with EO: 20 sec without LOB but noted decreased

## 2025-05-14 ENCOUNTER — HOSPITAL ENCOUNTER (OUTPATIENT)
Facility: HOSPITAL | Age: 70
Discharge: HOME | End: 2025-05-17
Payer: MEDICARE

## 2025-05-14 DIAGNOSIS — M81.0 AGE RELATED OSTEOPOROSIS, UNSPECIFIED PATHOLOGICAL FRACTURE PRESENCE: ICD-10-CM

## 2025-05-14 PROCEDURE — 77080 DXA BONE DENSITY AXIAL: CPT

## 2025-05-16 ENCOUNTER — APPOINTMENT (OUTPATIENT)
Facility: HOSPITAL | Age: 70
End: 2025-05-16
Payer: MEDICARE

## 2025-05-16 ENCOUNTER — HOSPITAL ENCOUNTER (OUTPATIENT)
Facility: HOSPITAL | Age: 70
Setting detail: RECURRING SERIES
Discharge: HOME | End: 2025-05-19
Payer: MEDICARE

## 2025-05-16 PROCEDURE — 97140 MANUAL THERAPY 1/> REGIONS: CPT

## 2025-05-16 PROCEDURE — 97535 SELF CARE MNGMENT TRAINING: CPT

## 2025-05-16 PROCEDURE — 97112 NEUROMUSCULAR REEDUCATION: CPT

## 2025-05-16 NOTE — PROGRESS NOTES
PHYSICAL / OCCUPATIONAL THERAPY - DAILY TREATMENT NOTE     Patient Name: Cynthia Kumar    Date: 2025    : 1955  Insurance: Payor: [employer]ICARE / Plan: AETNA MEDICARE-ADVANTAGE PPO / Product Type: Medicare /      Patient  verified Yes     Visit #   Current / Total 18 24   Time   In / Out 12:30 1:11   Pain   In / Out 0.5 0   Subjective Functional Status/Changes: Pt reports discomfort/annoying pain in the right glute.    Changes to:  Allergies, Med Hx, Sx Hx?   no       TREATMENT AREA =  Sacrococcygeal disorders, not elsewhere classified [M53.3]  Other low back pain [M54.59]    If an interpreting service is utilized for treatment of this patient, the contents of this document represent the material reviewed with the patient via the .       Therapeutic Procedures:  Tx Min Billable or 1:1 Min (if diff from Tx Min) Procedure, Rationale, Specifics   17  43662 Manual Therapy (timed):  decrease pain, increase ROM, increase tissue extensibility, and increase postural awareness to improve patient's ability to progress to PLOF and address remaining functional goals.  The manual therapy interventions were performed at a separate and distinct time from the therapeutic activities interventions . Details:      Details if applicable:  In supine: pelvic alignment and leg length assessments, right LE pull to correct right innominate upslip, MET to correct right posterior/left anterior innominates, shotgun technique; in B s/l: MET to correct sacral torsion (all with consent for hand placement).      14  91621 Self Care/Home Management (timed):  improve patient knowledge and understanding of home injury/symptom/pain management, positioning, posture/ergonomics, home safety, activity modification, transfer techniques, and joint protection strategies  to improve patient's ability to progress to PLOF and address remaining functional goals.  (see flow sheet as applicable)     Details if applicable:  Pt

## 2025-05-21 ENCOUNTER — HOSPITAL ENCOUNTER (OUTPATIENT)
Facility: HOSPITAL | Age: 70
Setting detail: RECURRING SERIES
Discharge: HOME | End: 2025-05-24
Payer: MEDICARE

## 2025-05-21 PROCEDURE — 97112 NEUROMUSCULAR REEDUCATION: CPT

## 2025-05-21 PROCEDURE — 97110 THERAPEUTIC EXERCISES: CPT

## 2025-05-21 PROCEDURE — 97140 MANUAL THERAPY 1/> REGIONS: CPT

## 2025-05-21 NOTE — PROGRESS NOTES
PHYSICAL / OCCUPATIONAL THERAPY - DAILY TREATMENT NOTE     Patient Name: Cynthia Kumar    Date: 2025    : 1955  Insurance: Payor: [employer]IC[employer] / Plan: AETNA MEDICARE-ADVANTAGE PPO / Product Type: Medicare /      Patient  verified Yes     Visit #   Current / Total 19 24   Time   In / Out 12:30 1:12   Pain   In / Out 0 0   Subjective Functional Status/Changes: Pt reports that she felt better with less back pan after her last session.   Changes to:  Allergies, Med Hx, Sx Hx?   no       TREATMENT AREA =  Sacrococcygeal disorders, not elsewhere classified [M53.3]  Other low back pain [M54.59]    If an interpreting service is utilized for treatment of this patient, the contents of this document represent the material reviewed with the patient via the .       Therapeutic Procedures:  Tx Min Billable or 1:1 Min (if diff from Tx Min) Procedure, Rationale, Specifics   17  25784 Therapeutic Exercise (timed):  increase ROM, strength, coordination, balance, and proprioception to improve patient's ability to progress to PLOF and address remaining functional goals. (see flow sheet as applicable)    Details if applicable:       17  71933 Neuromuscular Re-Education (timed):  improve balance, coordination, kinesthetic sense, posture, core stability and proprioception to improve patient's ability to develop conscious control of individual muscles and awareness of position of extremities in order to progress to PLOF and address remaining functional goals. (see flow sheet as applicable)    Details if applicable:     8  87619 Manual Therapy (timed):  decrease pain, increase ROM, and increase tissue extensibility to improve patient's ability to progress to PLOF and address remaining functional goals.  The manual therapy interventions were performed at a separate and distinct time from the therapeutic activities interventions . Details: Lumbar PA motions      Details if applicable:           Details if

## 2025-05-23 ENCOUNTER — HOSPITAL ENCOUNTER (OUTPATIENT)
Facility: HOSPITAL | Age: 70
Discharge: HOME | End: 2025-05-26
Payer: MEDICARE

## 2025-05-23 ENCOUNTER — HOSPITAL ENCOUNTER (OUTPATIENT)
Facility: HOSPITAL | Age: 70
Setting detail: RECURRING SERIES
Discharge: HOME | End: 2025-05-26
Payer: MEDICARE

## 2025-05-23 VITALS — BODY MASS INDEX: 22.45 KG/M2 | WEIGHT: 122 LBS | HEIGHT: 62 IN

## 2025-05-23 DIAGNOSIS — Z12.31 VISIT FOR SCREENING MAMMOGRAM: ICD-10-CM

## 2025-05-23 PROCEDURE — 97140 MANUAL THERAPY 1/> REGIONS: CPT

## 2025-05-23 PROCEDURE — 77063 BREAST TOMOSYNTHESIS BI: CPT

## 2025-05-23 PROCEDURE — 97110 THERAPEUTIC EXERCISES: CPT

## 2025-05-23 PROCEDURE — 97530 THERAPEUTIC ACTIVITIES: CPT

## 2025-05-23 NOTE — PROGRESS NOTES
PHYSICAL / OCCUPATIONAL THERAPY - DAILY TREATMENT NOTE     Patient Name: Cynthia Kumar    Date: 2025    : 1955  Insurance: Payor: [employer]IC[employer] / Plan: AETNA MEDICARE-ADVANTAGE PPO / Product Type: Medicare /      Patient  verified Yes     Visit #   Current / Total 20 24   Time   In / Out 12:30 1:11   Pain   In / Out 0 0   Subjective Functional Status/Changes: Pt reports continued improvements in her low back pain   Changes to:  Allergies, Med Hx, Sx Hx?   no       TREATMENT AREA =  Sacrococcygeal disorders, not elsewhere classified [M53.3]  Other low back pain [M54.59]    If an interpreting service is utilized for treatment of this patient, the contents of this document represent the material reviewed with the patient via the .       Therapeutic Procedures:  Tx Min Billable or 1:1 Min (if diff from Tx Min) Procedure, Rationale, Specifics   23  91380 Therapeutic Exercise (timed):  increase ROM, strength, coordination, balance, and proprioception to improve patient's ability to progress to PLOF and address remaining functional goals. (see flow sheet as applicable)    Details if applicable:       13  91343 Therapeutic Activity (timed):  use of dynamic activities replicating functional movements to increase ROM, strength, coordination, balance, and proprioception in order to improve patient's ability to progress to PLOF and address remaining functional goals.  (see flow sheet as applicable)    Details if applicable:     8  10728 Manual Therapy (timed):  decrease pain, increase ROM, and increase tissue extensibility to improve patient's ability to progress to PLOF and address remaining functional goals.  The manual therapy interventions were performed at a separate and distinct time from the therapeutic activities interventions . Details: Lumbar PA motions      Details if applicable:           Details if applicable:            Details if applicable:     41  [facility] Totals Reminder: bill

## 2025-05-28 ENCOUNTER — HOSPITAL ENCOUNTER (OUTPATIENT)
Facility: HOSPITAL | Age: 70
Setting detail: RECURRING SERIES
Discharge: HOME | End: 2025-05-31
Payer: MEDICARE

## 2025-05-28 PROCEDURE — 97140 MANUAL THERAPY 1/> REGIONS: CPT

## 2025-05-28 PROCEDURE — 97110 THERAPEUTIC EXERCISES: CPT

## 2025-05-28 PROCEDURE — 97530 THERAPEUTIC ACTIVITIES: CPT

## 2025-05-28 NOTE — PROGRESS NOTES
PHYSICAL / OCCUPATIONAL THERAPY - DAILY TREATMENT NOTE     Patient Name: Cynthia Kumar    Date: 2025    : 1955  Insurance: Payor: [employer] / Plan: AET MEDICARE-ADVANTAGE PPO / Product Type: Medicare /      Patient  verified Yes     Visit #   Current / Total 21 24   Time   In / Out 12:30 1:11   Pain   In / Out 0 0   Subjective Functional Status/Changes: Pt reports that she continues to feel better, and feel she is ready to discharge soon    Changes to:  Allergies, Med Hx, Sx Hx?   no       TREATMENT AREA =  Sacrococcygeal disorders, not elsewhere classified [M53.3]  Other low back pain [M54.59]    If an interpreting service is utilized for treatment of this patient, the contents of this document represent the material reviewed with the patient via the .       Therapeutic Procedures:  Tx Min Billable or 1:1 Min (if diff from Tx Min) Procedure, Rationale, Specifics   15  74453 Therapeutic Exercise (timed):  increase ROM, strength, coordination, balance, and proprioception to improve patient's ability to progress to PLOF and address remaining functional goals. (see flow sheet as applicable)    Details if applicable:       15  88087 Manual Therapy (timed):  decrease pain, increase ROM, and increase tissue extensibility to improve patient's ability to progress to PLOF and address remaining functional goals.  The manual therapy interventions were performed at a separate and distinct time from the therapeutic activities interventions . Details: STM to lumbar spine    Details if applicable:       06746 Therapeutic Activity (timed):  use of dynamic activities replicating functional movements to increase ROM, strength, coordination, balance, and proprioception in order to improve patient's ability to progress to PLOF and address remaining functional goals.  (see flow sheet as applicable)     Details if applicable:           Details if applicable:            Details if applicable:     41

## 2025-05-30 ENCOUNTER — HOSPITAL ENCOUNTER (OUTPATIENT)
Facility: HOSPITAL | Age: 70
Setting detail: RECURRING SERIES
End: 2025-05-30
Payer: MEDICARE

## 2025-05-30 PROCEDURE — 97530 THERAPEUTIC ACTIVITIES: CPT

## 2025-05-30 PROCEDURE — 97140 MANUAL THERAPY 1/> REGIONS: CPT

## 2025-05-30 NOTE — PROGRESS NOTES
[facility]  [address]  [place]  [address]  Phone: [phone]      Fax:  [phone]    Physical Therapy Discharge Summary    Patient name: Cynthia Kumar Start of Care: 3/18/2025   Referral source: Vic Dykes MD : [id number]   Medical/Treatment Diagnosis: Sacrococcygeal disorders, not elsewhere classified Onset Date:2024     Prior Hospitalization: see medical history Provider#: [id number]   Comorbidities: Depression, high blood pressure, osteoporosis, Thyroid problems, Pemphigus Vulgaris     Prior Level of Function:  Patient reports prior to 2024 was able to walk longer distances without as much discomfort    If an interpreting service is utilized for treatment of this patient, the contents of this document represent the material reviewed with the patient via the .     Visits from Start of Care: 22    Missed Visits: 0    Reporting Period : 3/18/2025 to 2025    Goals/Measure of Progress:  Short Term Goals: To be accomplished in 12 treatments     Patient will be independent with HEP to increase ease with ADLs.   Eval: HEP established  PN 2025: Reports continued compliance with HEP, MET  2. Patient will report pain at worst no more then 1/10 to increase ease with self care activities.               Eval: pain at worst: 4/10  PN 2025: Reports continued improvements in her back pain, reporting 1/10 today, progressing  Discharge 2025: 2/10 max pain reported, Nearly Met  3. Patient will maintain (B) SLS on airex for 30 seconds without LOB to increase ease with ambulating on uneven surfaces.               Eval: SLS on floor with EO: 20 sec without LOB but noted decreased ankle stability (B)   PN 2025: Right 18\", L 18\" consistent  Discharge 2025: Able to perform SLS on airex for greater than 20 secs bilaterally, MET  Long Term Goals: To be accomplished in 24 treatments  Patient will improve Oswestry to  20% or less to demonstrate improved function.   Eval: Oswestry: 34%   PN 5/13/2025: 31%, slight regression  Discharge 5/30/2025: 23%, Nearly Met  2. Patient will improve (B) hip strength to 4/5 to increase ease with household tasks.               Eval: Hip strength: Flexion: (B) 4-/5, abduction: right: 4-/5, left: 3+/5, Extension: (B) 3+/5   PN 5/13/2025: Flexion R - 4/5 Bilaterally L 4/5, Extension 4/5 Bilaterally, ABD 4/5 right, left 4-/5, progressing  Discharge 5/30/2025: Flexion R - 4+/5 L 4+/5, Extension R 4/5 L 4+/5 , ABD 4+/5 right, left 4+/5, progressing, Nearly Met  3. Patient will report being able to walk for 30 minutes with pain no more then 1/10 to increase ease with community Adls.               Eval: walking 30 minutes (pain level 5-6/10)              PN 5/21/2028: hasn't attempted long walk recently    Discharge 5/30/2025: hasn't attempted long walk recently, but feels this would be easier compared to when she first began therapy, partially met    Assessment/ Summary of Care: Pt has made gains since initially beginning physical therapy. Results of reassessment show continued improvements in pt's bilateral LE strength. Pt also demonstrates improved objective improved evidenced by decreased TARA score. Pt states that physical therapy has been helpful in decreasing her overall lower back pain. Pt has met or nearly met all pre established goals. Will discharge pt from caseload at this time.    RECOMMENDATIONS:  [x]Discontinue therapy: [x]Patient has reached or is progressing toward set goals      []Patient is non-compliant or has abdicated      []Due to lack of appreciable progress towards set goals    Leo Melo, PT 5/30/2025 9:03 AM

## 2025-05-30 NOTE — PROGRESS NOTES
PHYSICAL / OCCUPATIONAL THERAPY - DAILY TREATMENT NOTE     Patient Name: Cynthia Kumar    Date: 2025    : 1955  Insurance: Payor: [employer] / Plan: [employer] MEDICARE-ADVANTAGE PPO / Product Type: Medicare /      Patient  verified Yes     Visit #   Current / Total 22 24   Time   In / Out 10:30 1:13   Pain   In / Out 0 0   Subjective Functional Status/Changes: Pt reports no pain upon arrival today.   Changes to:  Allergies, Med Hx, Sx Hx?   no       TREATMENT AREA =  Sacrococcygeal disorders, not elsewhere classified [M53.3]  Other low back pain [M54.59]    If an interpreting service is utilized for treatment of this patient, the contents of this document represent the material reviewed with the patient via the .       Therapeutic Procedures:  Tx Min Billable or 1:1 Min (if diff from Tx Min) Procedure, Rationale, Specifics   8  38200 Manual Therapy (timed):  decrease pain, increase ROM, and increase tissue extensibility to improve patient's ability to progress to PLOF and address remaining functional goals.  The manual therapy interventions were performed at a separate and distinct time from the therapeutic activities interventions . Details: STM to lumbar spine    Details if applicable:       35  18560 Therapeutic Activity (timed):  use of dynamic activities replicating functional movements to increase ROM, strength, coordination, balance, and proprioception in order to improve patient's ability to progress to PLOF and address remaining functional goals.  (see flow sheet as applicable)    Details if applicable:            Details if applicable:           Details if applicable:            Details if applicable:     43  [facility] Totals Reminder: bill using total billable min of TIMED therapeutic procedures (example: do not include dry needle or estim unattended, both untimed codes, in totals to left)  8-22 min = 1 unit; 23-37 min = 2 units; 38-52 min = 3 units; 53-67 min = 4 units; 68-82

## (undated) DEVICE — MEDIA CONTRAST 10ML 200MG/ML 41%

## (undated) DEVICE — (D)SYR 10ML 1/5ML GRAD NSAF -- PKGING CHANGE USE ITEM 338027

## (undated) DEVICE — TRAY MYEL SFTY +

## (undated) DEVICE — (D)BNDG ADHESIVE FABRIC 3/4X3 -- DISC BY MFR USE ITEM 357960

## (undated) DEVICE — NDL SPNE MANAN 22GX6IN --